# Patient Record
Sex: FEMALE | Race: WHITE | Employment: FULL TIME | URBAN - METROPOLITAN AREA
[De-identification: names, ages, dates, MRNs, and addresses within clinical notes are randomized per-mention and may not be internally consistent; named-entity substitution may affect disease eponyms.]

---

## 2023-08-11 ENCOUNTER — LAB ENCOUNTER (OUTPATIENT)
Dept: LAB | Age: 38
End: 2023-08-11
Attending: INTERNAL MEDICINE
Payer: COMMERCIAL

## 2023-08-11 ENCOUNTER — OFFICE VISIT (OUTPATIENT)
Dept: INTERNAL MEDICINE CLINIC | Facility: CLINIC | Age: 38
End: 2023-08-11
Payer: COMMERCIAL

## 2023-08-11 VITALS
HEIGHT: 64 IN | BODY MASS INDEX: 37.22 KG/M2 | SYSTOLIC BLOOD PRESSURE: 138 MMHG | OXYGEN SATURATION: 98 % | WEIGHT: 218 LBS | DIASTOLIC BLOOD PRESSURE: 84 MMHG | TEMPERATURE: 97 F | HEART RATE: 79 BPM | RESPIRATION RATE: 18 BRPM

## 2023-08-11 DIAGNOSIS — Z87.59 HISTORY OF PRE-ECLAMPSIA: ICD-10-CM

## 2023-08-11 DIAGNOSIS — Z13.220 SCREENING, LIPID: ICD-10-CM

## 2023-08-11 DIAGNOSIS — R42 DIZZINESS: ICD-10-CM

## 2023-08-11 DIAGNOSIS — Z13.228 SCREENING FOR METABOLIC DISORDER: ICD-10-CM

## 2023-08-11 DIAGNOSIS — Z13.29 SCREENING FOR THYROID DISORDER: Primary | ICD-10-CM

## 2023-08-11 DIAGNOSIS — Z13.29 SCREENING FOR THYROID DISORDER: ICD-10-CM

## 2023-08-11 DIAGNOSIS — G43.109 MIGRAINE WITH AURA AND WITHOUT STATUS MIGRAINOSUS, NOT INTRACTABLE: ICD-10-CM

## 2023-08-11 DIAGNOSIS — I10 PRIMARY HYPERTENSION: ICD-10-CM

## 2023-08-11 LAB
ANION GAP SERPL CALC-SCNC: 4 MMOL/L (ref 0–18)
BUN BLD-MCNC: 20 MG/DL (ref 7–18)
CALCIUM BLD-MCNC: 9.5 MG/DL (ref 8.5–10.1)
CHLORIDE SERPL-SCNC: 106 MMOL/L (ref 98–112)
CHOLEST SERPL-MCNC: 166 MG/DL (ref ?–200)
CO2 SERPL-SCNC: 26 MMOL/L (ref 21–32)
CREAT BLD-MCNC: 1.04 MG/DL
EGFRCR SERPLBLD CKD-EPI 2021: 71 ML/MIN/1.73M2 (ref 60–?)
FASTING PATIENT LIPID ANSWER: YES
FASTING STATUS PATIENT QL REPORTED: YES
GLUCOSE BLD-MCNC: 96 MG/DL (ref 70–99)
HDLC SERPL-MCNC: 43 MG/DL (ref 40–59)
LDLC SERPL CALC-MCNC: 85 MG/DL (ref ?–100)
NONHDLC SERPL-MCNC: 123 MG/DL (ref ?–130)
OSMOLALITY SERPL CALC.SUM OF ELEC: 284 MOSM/KG (ref 275–295)
POTASSIUM SERPL-SCNC: 4 MMOL/L (ref 3.5–5.1)
SODIUM SERPL-SCNC: 136 MMOL/L (ref 136–145)
TRIGL SERPL-MCNC: 226 MG/DL (ref 30–149)
TSI SER-ACNC: 0.95 MIU/ML (ref 0.36–3.74)
VLDLC SERPL CALC-MCNC: 36 MG/DL (ref 0–30)

## 2023-08-11 PROCEDURE — 84443 ASSAY THYROID STIM HORMONE: CPT | Performed by: INTERNAL MEDICINE

## 2023-08-11 PROCEDURE — 80048 BASIC METABOLIC PNL TOTAL CA: CPT | Performed by: INTERNAL MEDICINE

## 2023-08-11 PROCEDURE — 3079F DIAST BP 80-89 MM HG: CPT | Performed by: INTERNAL MEDICINE

## 2023-08-11 PROCEDURE — 80061 LIPID PANEL: CPT | Performed by: INTERNAL MEDICINE

## 2023-08-11 PROCEDURE — 3008F BODY MASS INDEX DOCD: CPT | Performed by: INTERNAL MEDICINE

## 2023-08-11 PROCEDURE — 99204 OFFICE O/P NEW MOD 45 MIN: CPT | Performed by: INTERNAL MEDICINE

## 2023-08-11 PROCEDURE — 3075F SYST BP GE 130 - 139MM HG: CPT | Performed by: INTERNAL MEDICINE

## 2023-08-11 RX ORDER — NIFEDIPINE 60 MG/1
60 TABLET, FILM COATED, EXTENDED RELEASE ORAL DAILY
Qty: 90 TABLET | Refills: 0 | Status: SHIPPED | OUTPATIENT
Start: 2023-08-11

## 2023-08-11 RX ORDER — NIFEDIPINE 30 MG
TABLET, EXTENDED RELEASE 24 HR ORAL
COMMUNITY
Start: 2023-04-15

## 2023-08-11 RX ORDER — FAMOTIDINE 20 MG/1
TABLET, FILM COATED ORAL
COMMUNITY
Start: 2023-05-14

## 2023-08-11 RX ORDER — ELETRIPTAN HYDROBROMIDE 40 MG/1
TABLET, FILM COATED ORAL
COMMUNITY
Start: 2023-06-07

## 2023-08-11 RX ORDER — PROMETHAZINE HYDROCHLORIDE 25 MG/1
1 TABLET ORAL EVERY 6 HOURS PRN
COMMUNITY

## 2023-08-23 ENCOUNTER — OFFICE VISIT (OUTPATIENT)
Dept: INTERNAL MEDICINE CLINIC | Facility: CLINIC | Age: 38
End: 2023-08-23
Payer: COMMERCIAL

## 2023-08-23 VITALS
DIASTOLIC BLOOD PRESSURE: 68 MMHG | TEMPERATURE: 97 F | OXYGEN SATURATION: 97 % | HEART RATE: 92 BPM | RESPIRATION RATE: 16 BRPM | SYSTOLIC BLOOD PRESSURE: 128 MMHG | WEIGHT: 223.63 LBS | BODY MASS INDEX: 39.13 KG/M2 | HEIGHT: 63.39 IN

## 2023-08-23 DIAGNOSIS — Z87.59 HISTORY OF PRE-ECLAMPSIA: ICD-10-CM

## 2023-08-23 DIAGNOSIS — G89.29 CHRONIC NECK PAIN: ICD-10-CM

## 2023-08-23 DIAGNOSIS — M54.2 CHRONIC NECK PAIN: ICD-10-CM

## 2023-08-23 DIAGNOSIS — G44.209 TENSION HEADACHE: ICD-10-CM

## 2023-08-23 DIAGNOSIS — G43.109 MIGRAINE WITH AURA AND WITHOUT STATUS MIGRAINOSUS, NOT INTRACTABLE: Primary | ICD-10-CM

## 2023-08-23 DIAGNOSIS — R42 DIZZINESS: ICD-10-CM

## 2023-08-23 PROCEDURE — 3008F BODY MASS INDEX DOCD: CPT | Performed by: INTERNAL MEDICINE

## 2023-08-23 PROCEDURE — 3078F DIAST BP <80 MM HG: CPT | Performed by: INTERNAL MEDICINE

## 2023-08-23 PROCEDURE — 99214 OFFICE O/P EST MOD 30 MIN: CPT | Performed by: INTERNAL MEDICINE

## 2023-08-23 PROCEDURE — 3074F SYST BP LT 130 MM HG: CPT | Performed by: INTERNAL MEDICINE

## 2023-08-23 RX ORDER — PROPRANOLOL HCL 60 MG
60 CAPSULE, EXTENDED RELEASE 24HR ORAL DAILY
Qty: 30 CAPSULE | Refills: 0 | Status: SHIPPED | OUTPATIENT
Start: 2023-08-23 | End: 2023-09-22

## 2023-08-24 ENCOUNTER — HOSPITAL ENCOUNTER (OUTPATIENT)
Dept: CT IMAGING | Age: 38
Discharge: HOME | End: 2023-08-24
Attending: INTERNAL MEDICINE
Payer: COMMERCIAL

## 2023-08-24 DIAGNOSIS — R42 DIZZINESS: ICD-10-CM

## 2023-08-24 DIAGNOSIS — G43.109 MIGRAINE WITH AURA AND WITHOUT STATUS MIGRAINOSUS, NOT INTRACTABLE: ICD-10-CM

## 2023-08-24 PROCEDURE — 70450 CT HEAD/BRAIN W/O DYE: CPT | Performed by: INTERNAL MEDICINE

## 2023-08-25 ENCOUNTER — LABORATORY ENCOUNTER (OUTPATIENT)
Dept: LAB | Age: 38
End: 2023-08-25
Attending: INTERNAL MEDICINE
Payer: COMMERCIAL

## 2023-08-25 DIAGNOSIS — Z87.59 HISTORY OF PRE-ECLAMPSIA: ICD-10-CM

## 2023-08-25 LAB
ALBUMIN SERPL-MCNC: 3.8 G/DL (ref 3.4–5)
ALP LIVER SERPL-CCNC: 106 U/L
ALT SERPL-CCNC: 29 U/L
AST SERPL-CCNC: 16 U/L (ref 15–37)
BASOPHILS # BLD AUTO: 0.07 X10(3) UL (ref 0–0.2)
BASOPHILS NFR BLD AUTO: 1.2 %
BILIRUB DIRECT SERPL-MCNC: <0.1 MG/DL (ref 0–0.2)
BILIRUB SERPL-MCNC: 0.5 MG/DL (ref 0.1–2)
BILIRUB UR QL STRIP.AUTO: NEGATIVE
CLARITY UR REFRACT.AUTO: CLEAR
COLOR UR AUTO: YELLOW
EOSINOPHIL # BLD AUTO: 0.11 X10(3) UL (ref 0–0.7)
EOSINOPHIL NFR BLD AUTO: 1.9 %
ERYTHROCYTE [DISTWIDTH] IN BLOOD BY AUTOMATED COUNT: 14.3 %
GLUCOSE UR STRIP.AUTO-MCNC: NORMAL MG/DL
HCT VFR BLD AUTO: 39.8 %
HGB BLD-MCNC: 13.3 G/DL
IMM GRANULOCYTES # BLD AUTO: 0.03 X10(3) UL (ref 0–1)
IMM GRANULOCYTES NFR BLD: 0.5 %
KETONES UR STRIP.AUTO-MCNC: NEGATIVE MG/DL
LEUKOCYTE ESTERASE UR QL STRIP.AUTO: NEGATIVE
LYMPHOCYTES # BLD AUTO: 2.35 X10(3) UL (ref 1–4)
LYMPHOCYTES NFR BLD AUTO: 40.7 %
MCH RBC QN AUTO: 28.9 PG (ref 26–34)
MCHC RBC AUTO-ENTMCNC: 33.4 G/DL (ref 31–37)
MCV RBC AUTO: 86.5 FL
MONOCYTES # BLD AUTO: 0.44 X10(3) UL (ref 0.1–1)
MONOCYTES NFR BLD AUTO: 7.6 %
NEUTROPHILS # BLD AUTO: 2.78 X10 (3) UL (ref 1.5–7.7)
NEUTROPHILS # BLD AUTO: 2.78 X10(3) UL (ref 1.5–7.7)
NEUTROPHILS NFR BLD AUTO: 48.1 %
NITRITE UR QL STRIP.AUTO: NEGATIVE
PH UR STRIP.AUTO: 6 [PH] (ref 5–8)
PLATELET # BLD AUTO: 225 10(3)UL (ref 150–450)
PROT SERPL-MCNC: 7.1 G/DL (ref 6.4–8.2)
PROT UR STRIP.AUTO-MCNC: 20 MG/DL
RBC # BLD AUTO: 4.6 X10(6)UL
RBC #/AREA URNS AUTO: >10 /HPF
SP GR UR STRIP.AUTO: 1.02 (ref 1–1.03)
UROBILINOGEN UR STRIP.AUTO-MCNC: NORMAL MG/DL
WBC # BLD AUTO: 5.8 X10(3) UL (ref 4–11)

## 2023-08-25 PROCEDURE — 81001 URINALYSIS AUTO W/SCOPE: CPT | Performed by: INTERNAL MEDICINE

## 2023-08-25 PROCEDURE — 85025 COMPLETE CBC W/AUTO DIFF WBC: CPT | Performed by: INTERNAL MEDICINE

## 2023-08-25 PROCEDURE — 80076 HEPATIC FUNCTION PANEL: CPT | Performed by: INTERNAL MEDICINE

## 2023-08-27 DIAGNOSIS — R74.8 ELEVATED ALKALINE PHOSPHATASE LEVEL: Primary | ICD-10-CM

## 2023-08-27 DIAGNOSIS — R31.9 HEMATURIA, UNSPECIFIED TYPE: ICD-10-CM

## 2023-08-28 ENCOUNTER — PATIENT MESSAGE (OUTPATIENT)
Dept: INTERNAL MEDICINE CLINIC | Facility: CLINIC | Age: 38
End: 2023-08-28

## 2023-08-30 ENCOUNTER — APPOINTMENT (OUTPATIENT)
Facility: LOCATION | Age: 38
End: 2023-08-30
Attending: INTERNAL MEDICINE
Payer: COMMERCIAL

## 2023-08-30 ENCOUNTER — TELEPHONE (OUTPATIENT)
Dept: PHYSICAL THERAPY | Facility: HOSPITAL | Age: 38
End: 2023-08-30

## 2023-09-05 NOTE — TELEPHONE ENCOUNTER
Spoke with patient   Repeating UA in 1 month  HA improved with propranolol  Patient to check BP for the next 1 week and send via [employer]

## 2023-09-06 ENCOUNTER — APPOINTMENT (OUTPATIENT)
Facility: LOCATION | Age: 38
End: 2023-09-06
Attending: INTERNAL MEDICINE
Payer: COMMERCIAL

## 2023-09-12 ENCOUNTER — APPOINTMENT (OUTPATIENT)
Facility: LOCATION | Age: 38
End: 2023-09-12
Attending: INTERNAL MEDICINE
Payer: COMMERCIAL

## 2023-09-14 ENCOUNTER — APPOINTMENT (OUTPATIENT)
Facility: LOCATION | Age: 38
End: 2023-09-14
Attending: INTERNAL MEDICINE
Payer: COMMERCIAL

## 2023-09-19 ENCOUNTER — APPOINTMENT (OUTPATIENT)
Facility: LOCATION | Age: 38
End: 2023-09-19
Attending: INTERNAL MEDICINE
Payer: COMMERCIAL

## 2023-09-21 ENCOUNTER — APPOINTMENT (OUTPATIENT)
Facility: LOCATION | Age: 38
End: 2023-09-21
Attending: INTERNAL MEDICINE
Payer: COMMERCIAL

## 2023-09-22 ENCOUNTER — APPOINTMENT (OUTPATIENT)
Dept: PHYSICAL THERAPY | Facility: HOSPITAL | Age: 38
End: 2023-09-22
Attending: INTERNAL MEDICINE
Payer: COMMERCIAL

## 2023-09-22 ENCOUNTER — TELEPHONE (OUTPATIENT)
Dept: PHYSICAL THERAPY | Facility: HOSPITAL | Age: 38
End: 2023-09-22

## 2023-09-22 NOTE — TELEPHONE ENCOUNTER
Called the patient regarding her missed physical therapy evaluation. There was no answer, so left a voicemail. Reminded the patient of her next scheduled appointment and asked for a call back if she is unable to make this appointment.

## 2023-09-26 ENCOUNTER — APPOINTMENT (OUTPATIENT)
Facility: LOCATION | Age: 38
End: 2023-09-26
Attending: INTERNAL MEDICINE
Payer: COMMERCIAL

## 2023-09-28 ENCOUNTER — APPOINTMENT (OUTPATIENT)
Facility: LOCATION | Age: 38
End: 2023-09-28
Attending: INTERNAL MEDICINE
Payer: COMMERCIAL

## 2023-10-02 RX ORDER — PROPRANOLOL HCL 60 MG
1 CAPSULE, EXTENDED RELEASE 24HR ORAL DAILY
Qty: 90 CAPSULE | Refills: 0 | Status: SHIPPED | OUTPATIENT
Start: 2023-10-02

## 2023-10-04 ENCOUNTER — TELEPHONE (OUTPATIENT)
Dept: PHYSICAL THERAPY | Facility: HOSPITAL | Age: 38
End: 2023-10-04

## 2023-10-05 ENCOUNTER — APPOINTMENT (OUTPATIENT)
Dept: PHYSICAL THERAPY | Facility: HOSPITAL | Age: 38
End: 2023-10-05
Attending: INTERNAL MEDICINE
Payer: COMMERCIAL

## 2023-10-09 ENCOUNTER — OFFICE VISIT (OUTPATIENT)
Dept: OBGYN CLINIC | Facility: CLINIC | Age: 38
End: 2023-10-09
Payer: COMMERCIAL

## 2023-10-09 ENCOUNTER — LAB ENCOUNTER (OUTPATIENT)
Dept: LAB | Age: 38
End: 2023-10-09
Attending: STUDENT IN AN ORGANIZED HEALTH CARE EDUCATION/TRAINING PROGRAM
Payer: COMMERCIAL

## 2023-10-09 VITALS
HEART RATE: 72 BPM | RESPIRATION RATE: 16 BRPM | HEIGHT: 63 IN | DIASTOLIC BLOOD PRESSURE: 100 MMHG | BODY MASS INDEX: 39.69 KG/M2 | WEIGHT: 224 LBS | SYSTOLIC BLOOD PRESSURE: 140 MMHG

## 2023-10-09 DIAGNOSIS — R79.89 ELEVATED SERUM CREATININE: ICD-10-CM

## 2023-10-09 DIAGNOSIS — Z31.69 ENCOUNTER FOR PRECONCEPTION CONSULTATION: ICD-10-CM

## 2023-10-09 LAB
ANION GAP SERPL CALC-SCNC: 5 MMOL/L (ref 0–18)
BUN BLD-MCNC: 21 MG/DL (ref 7–18)
CALCIUM BLD-MCNC: 9.2 MG/DL (ref 8.5–10.1)
CHLORIDE SERPL-SCNC: 108 MMOL/L (ref 98–112)
CO2 SERPL-SCNC: 25 MMOL/L (ref 21–32)
CREAT BLD-MCNC: 0.86 MG/DL
EGFRCR SERPLBLD CKD-EPI 2021: 89 ML/MIN/1.73M2 (ref 60–?)
FASTING STATUS PATIENT QL REPORTED: NO
GLUCOSE BLD-MCNC: 139 MG/DL (ref 70–99)
OSMOLALITY SERPL CALC.SUM OF ELEC: 291 MOSM/KG (ref 275–295)
POTASSIUM SERPL-SCNC: 3.9 MMOL/L (ref 3.5–5.1)
SODIUM SERPL-SCNC: 138 MMOL/L (ref 136–145)

## 2023-10-09 PROCEDURE — 3008F BODY MASS INDEX DOCD: CPT | Performed by: STUDENT IN AN ORGANIZED HEALTH CARE EDUCATION/TRAINING PROGRAM

## 2023-10-09 PROCEDURE — 3080F DIAST BP >= 90 MM HG: CPT | Performed by: STUDENT IN AN ORGANIZED HEALTH CARE EDUCATION/TRAINING PROGRAM

## 2023-10-09 PROCEDURE — 3077F SYST BP >= 140 MM HG: CPT | Performed by: STUDENT IN AN ORGANIZED HEALTH CARE EDUCATION/TRAINING PROGRAM

## 2023-10-09 PROCEDURE — 99204 OFFICE O/P NEW MOD 45 MIN: CPT | Performed by: STUDENT IN AN ORGANIZED HEALTH CARE EDUCATION/TRAINING PROGRAM

## 2023-10-09 PROCEDURE — 80048 BASIC METABOLIC PNL TOTAL CA: CPT

## 2023-10-09 PROCEDURE — 36415 COLL VENOUS BLD VENIPUNCTURE: CPT

## 2023-10-09 RX ORDER — VITAMIN B COMPLEX
TABLET ORAL AS DIRECTED
COMMUNITY

## 2023-10-09 NOTE — PROGRESS NOTES
GYN PROBLEM VISIT    Subjective: This is a 45year old [id number] presenting for GYN visit. She delivered her last child via  in April, conceived via IVF. She has embryos remaining. They were tested. She would like to conceive. Review of Systems   Constitutional: Negative. HENT: Negative. Respiratory: Negative. Gastrointestinal: Negative. Endocrine: Negative. Genitourinary: Negative. Musculoskeletal: Negative. Skin: Negative. Allergic/Immunologic: Negative. Neurological: Negative. Past Medical History:   Diagnosis Date    GERD     Hypertriglyceridemia     Obesity        History reviewed. No pertinent surgical history. No Known Allergies    Propranolol HCl ER 60 MG Oral Capsule SR 24 Hr, TAKE 1 CAPSULE(60 MG) BY MOUTH DAILY, Disp: 90 capsule, Rfl: 0  PROCARDIA XL 30 MG Oral Tablet 24 Hr, , Disp: , Rfl:   famotidine 20 MG Oral Tab, , Disp: , Rfl:   Eletriptan Hydrobromide 40 MG Oral Tab, , Disp: , Rfl:   promethazine 25 MG Oral Tab, Take 1 tablet (25 mg total) by mouth every 6 (six) hours as needed. , Disp: , Rfl:   Esomeprazole Magnesium 20 MG Oral Capsule Delayed Release, Take 1 capsule (20 mg total) by mouth every morning before breakfast., Disp: , Rfl:   NIFEdipine ER 60 MG Oral Tablet 24 Hr, Take 1 tablet (60 mg total) by mouth daily. , Disp: 90 tablet, Rfl: 0          Objective:    Physical Exam      10/09/23  1056   BP: (!) 140/100   Pulse: 72   Resp: 16        Constitutional: She is oriented to person, place, and time. She appears well-developed and well-nourished. Assessment/Plan: This is a 45year old [id number] presenting for preconception counseling. She developed preeclampsia necessitating delivery at 37 weeks with last pregnancy. She now has chronic HTN. She is on procardia and propanolol. We discussed risk of superimposed preeclampsia with preexisting HTN. We discussed risk of end organ damage with preeclampsia. Discussed aspirin at 12 weeks.  We discussed risk of growth restriction, placental insufficiency, still birth and  delivery. Discussed fetal echo given IVF gestation, serial growth ultrasounds and NSTs around 34 weeks for cHTN. Strongly recommended weight loss. Referral to weight loss clinic ordered. Also noted Cr to be 1.04, so repeat was ordered. Total patient time was 30 minutes in evaluation, consultation, and coordination of care. This included face to face and non-face to face actions. The patient's questions and concerns that were addressed.      Aurelia Darby MD

## 2023-10-09 NOTE — PATIENT INSTRUCTIONS
Reproductive Endocrinology and Infertility Specialist (MIRANDA):    1) Dr. Benny Cabrera, IVF1   [address] [place], [address]  [phone]    2) Dr. Jaja Gillette   Phone: [phone]  Address: [facility] [address], [address]  [place], [address]    3) [facility]

## 2023-10-11 ENCOUNTER — APPOINTMENT (OUTPATIENT)
Dept: PHYSICAL THERAPY | Facility: HOSPITAL | Age: 38
End: 2023-10-11
Attending: INTERNAL MEDICINE
Payer: COMMERCIAL

## 2023-10-13 ENCOUNTER — OFFICE VISIT (OUTPATIENT)
Dept: PHYSICAL THERAPY | Facility: HOSPITAL | Age: 38
End: 2023-10-13
Attending: INTERNAL MEDICINE
Payer: COMMERCIAL

## 2023-10-13 DIAGNOSIS — G44.209 TENSION HEADACHE: ICD-10-CM

## 2023-10-13 DIAGNOSIS — G89.29 CHRONIC NECK PAIN: Primary | ICD-10-CM

## 2023-10-13 DIAGNOSIS — M54.2 CHRONIC NECK PAIN: Primary | ICD-10-CM

## 2023-10-13 PROCEDURE — 97162 PT EVAL MOD COMPLEX 30 MIN: CPT

## 2023-10-13 PROCEDURE — 97140 MANUAL THERAPY 1/> REGIONS: CPT

## 2023-10-16 ENCOUNTER — OFFICE VISIT (OUTPATIENT)
Dept: PHYSICAL THERAPY | Facility: HOSPITAL | Age: 38
End: 2023-10-16
Attending: INTERNAL MEDICINE
Payer: COMMERCIAL

## 2023-10-16 PROCEDURE — 97140 MANUAL THERAPY 1/> REGIONS: CPT

## 2023-10-16 PROCEDURE — 97110 THERAPEUTIC EXERCISES: CPT

## 2023-10-16 NOTE — PROGRESS NOTES
Diagnosis:   Chronic neck pain (M54.2,G89.29)  Tension headache (G44.209)     Referring Provider: Silver Kelley  Date of Evaluation:    10/13/2023    Precautions:  None Next MD visit:   none scheduled  Date of Surgery: n/a   Insurance Primary/Secondary: [address] [place] [address] / N/A     # Auth Visits: 8 POC            Subjective: the patient reports that she had no headache after last session. She states that she had a headache yesterday and Saturday in the forehead , but not the neck. It felt like tension in her forehead. She took 3 Advil which reduced it, then she was able to fall asleep. Then it was better when she woke. She felt fatigued with the headache. Pain: 3/10      Objective:     Palpation: there is increased soft tissue tension on L upper trapezius, palpation reproduces pain in the forehead  Flexion rotation test: (-), pt has equal ROM bilaterally and no pain with this. Assessment: The patient had her forehead pain reproduced with palpation to upper trapezius on L today. The patient had a different type of headache from the headaches that radiate from the cervical spine. She had resolution of her headache with palpation to the upper trapezius. Continued with PA mobilizations in upper cervical spine to address mobility and decrease pain. Goals:   (to be met in 8 visits)  PROGRESSING  The patient will report 2 headaches or fewer per week  The patient will report being able to work past 3 pm without increase in headache symptoms  The patient will report little difficulty concentrating on NDI  The patient will report that her sleep is no longer disturbed due to headaches  The patient will be independent and adherent in a comprehensive HEP    Plan: scapular strength in prone  Date: 10/16/2023  TX#: 2/8 Date:                 TX#: 3/ Date:                 TX#: 4/ Date:                 TX#: 5/ Date:    Tx#: 6/   Manual therapy  STM to upper trapezius on L  C2 CPA grade III  X13 min       Therapeutic exercise  Low row green TB  Upper trap stretch 3x30 sec on L  Shoulder extension green TB 2x10  Cervical protraction/retraction 10x       X       X       HEP:   Access Code: [email or web address]  URL: "[email or web address]  Date: 10/16/2023  Prepared by: Paul Bell    Exercises  - Seated Cervical Retraction  - 5 x daily - 7 x weekly - 1 sets - 10 reps  - Seated Upper Trapezius Stretch  - 1 x daily - 7 x weekly - 1 sets - 3 reps - 30 sec hold    Charges: manual therapy x1, therapeutic exercise x2       Total Timed Treatment: 45 min  Total Treatment Time: 45 min

## 2023-10-18 ENCOUNTER — OFFICE VISIT (OUTPATIENT)
Dept: PHYSICAL THERAPY | Facility: HOSPITAL | Age: 38
End: 2023-10-18
Attending: INTERNAL MEDICINE
Payer: COMMERCIAL

## 2023-10-18 PROCEDURE — 97110 THERAPEUTIC EXERCISES: CPT

## 2023-10-18 PROCEDURE — 97140 MANUAL THERAPY 1/> REGIONS: CPT

## 2023-10-18 NOTE — PROGRESS NOTES
Diagnosis:   Chronic neck pain (M54.2,G89.29)  Tension headache (G44.209)     Referring Provider: Lety Darnell  Date of Evaluation:    10/13/2023    Precautions:  None Next MD visit:   none scheduled  Date of Surgery: n/a   Insurance Primary/Secondary: [address] [place] [address] / N/A     # Auth Visits: 8 POC            Subjective: no headache yesterday or today. The headahce was better after the last session. She feels some pressure in her sinus. Pain: 0/10      Objective:     Palpation: there is increased soft tissue tension on L upper trapezius, palpation reproduces pain in the forehead      Prone extension endurance test: 1 min. Research: The migraine group held the neck extensor endurance test position for a median of 166.5 seconds compared to 290.5 seconds held by the control group. Assessment: The patient has noted improvements in her symptoms in the last few days, with no headache in the last 2 days. She does continue to have hypomobility at C2, therefore continued with mobilizations here. Assessed a deep extensor test to assess for postural endurance. Will have the patient perform resisted cervical extension at home. Goals:   (to be met in 8 visits)  PROGRESSING  The patient will report 2 headaches or fewer per week  The patient will report being able to work past 3 pm without increase in headache symptoms  The patient will report little difficulty concentrating on NDI  The patient will report that her sleep is no longer disturbed due to headaches  The patient will be independent and adherent in a comprehensive HEP    Plan: scapular strength in prone on swiss ball  Date: 10/16/2023  TX#: 2/8 Date: 10/18/2023            TX#: 3/8 Date:                 TX#: 4/ Date:                 TX#: 5/ Date:    Tx#: 6/   Manual therapy  STM to upper trapezius on L  C2 CPA grade III  X13 min Manual therapy  C2 CPA grade III, grade III+  X9 min        Therapeutic exercise  Low row green TB  Upper trap stretch 3x30 sec on L  Shoulder extension green TB 2x10  Cervical protraction/retraction 10x Therapeutic exercise  Prone resisted cervical extension green TB 3 sets to fatigue  Prone horizontal abduction 2# 3x8-12  Sidelying open book rotation 10x ea  Pec stretch 3x30 sec in door      X X      X X      HEP:   Access Code: [email or web address]  URL: [email or web address]  Date: 10/18/2023  Prepared by: Franco Parekh    Exercises  - Seated Cervical Retraction  - 5 x daily - 7 x weekly - 1 sets - 10 reps  - Seated Upper Trapezius Stretch  - 1 x daily - 7 x weekly - 1 sets - 3 reps - 30 sec hold  - Cervical Retraction Prone on Elbows  - 1 x daily - 7 x weekly - 3 sets - 6-10 reps    Charges: manual therapy x1, therapeutic exercise x2       Total Timed Treatment: 45 min  Total Treatment Time: 45 min

## 2023-10-19 ENCOUNTER — APPOINTMENT (OUTPATIENT)
Dept: PHYSICAL THERAPY | Facility: HOSPITAL | Age: 38
End: 2023-10-19
Attending: INTERNAL MEDICINE
Payer: COMMERCIAL

## 2023-11-01 ENCOUNTER — APPOINTMENT (OUTPATIENT)
Dept: PHYSICAL THERAPY | Facility: HOSPITAL | Age: 38
End: 2023-11-01
Attending: INTERNAL MEDICINE
Payer: COMMERCIAL

## 2023-11-01 ENCOUNTER — TELEPHONE (OUTPATIENT)
Dept: PHYSICAL THERAPY | Facility: HOSPITAL | Age: 38
End: 2023-11-01

## 2023-11-03 ENCOUNTER — APPOINTMENT (OUTPATIENT)
Dept: PHYSICAL THERAPY | Facility: HOSPITAL | Age: 38
End: 2023-11-03
Attending: INTERNAL MEDICINE
Payer: COMMERCIAL

## 2023-11-06 ENCOUNTER — HOSPITAL ENCOUNTER (OUTPATIENT)
Age: 38
Discharge: HOME | End: 2023-11-06
Payer: COMMERCIAL

## 2023-11-06 VITALS
DIASTOLIC BLOOD PRESSURE: 88 MMHG | HEIGHT: 63 IN | TEMPERATURE: 97 F | HEART RATE: 88 BPM | RESPIRATION RATE: 18 BRPM | BODY MASS INDEX: 38.98 KG/M2 | SYSTOLIC BLOOD PRESSURE: 133 MMHG | WEIGHT: 220 LBS | OXYGEN SATURATION: 99 %

## 2023-11-06 DIAGNOSIS — J06.9 UPPER RESPIRATORY INFECTION WITH COUGH AND CONGESTION: Primary | ICD-10-CM

## 2023-11-06 PROCEDURE — 99203 OFFICE O/P NEW LOW 30 MIN: CPT | Performed by: NURSE PRACTITIONER

## 2023-11-06 RX ORDER — ALBUTEROL SULFATE 90 UG/1
2 AEROSOL, METERED RESPIRATORY (INHALATION) EVERY 4 HOURS PRN
Qty: 1 EACH | Refills: 0 | Status: SHIPPED | OUTPATIENT
Start: 2023-11-06 | End: 2023-12-06

## 2023-11-06 RX ORDER — PREDNISONE 20 MG/1
40 TABLET ORAL DAILY
Qty: 10 TABLET | Refills: 0 | Status: SHIPPED | OUTPATIENT
Start: 2023-11-06 | End: 2023-11-11

## 2023-11-07 NOTE — ED INITIAL ASSESSMENT (HPI)
Patient with cough/URI and ear pain that started on Saturday. Pt has done two home covid tests, both negative.

## 2023-11-07 NOTE — DISCHARGE INSTRUCTIONS
Take the medications as prescribed. Interventions to help decrease congestion: Over the counter Flonase, hot steam showers, saline irrigation such as the [employer], Over the counter Zyrtec, Claritin, or Allegra. Drink plenty fluids, especially water. Return or go to the ER for new or worsening symptoms.

## 2023-11-11 DIAGNOSIS — I10 PRIMARY HYPERTENSION: ICD-10-CM

## 2023-11-11 DIAGNOSIS — Z87.59 HISTORY OF PRE-ECLAMPSIA: ICD-10-CM

## 2023-11-13 NOTE — TELEPHONE ENCOUNTER
Last OV: Acute 08/23/23 MP    Future Appointments   Date Time Provider Department Center   1/10/2024  3:00 PM Bang Chiang MD [facility]   2/6/2024  9:20 AM Mel Go APRN [facility]        Latest labs:   BMP,Lipid, TSH 08/11/23    Latest RX:   Medication Quantity Refills Start End   NIFEdipine ER 60 MG Oral Tablet 24 Hr 90 tablet 0 8/11/2023    Sig:   Take 1 tablet (60 mg total) by mouth daily.       Per protocol    Hypertension Medications Protocol [id number]/11/2023 04:30 PM   Protocol Details CMP or BMP in past 12 months    Last serum creatinine< 2.0    Appointment in past 6 or next 3 months

## 2023-11-22 ENCOUNTER — HOSPITAL ENCOUNTER (EMERGENCY)
Facility: HOSPITAL | Age: 38
Discharge: HOME | End: 2023-11-23
Attending: EMERGENCY MEDICINE
Payer: COMMERCIAL

## 2023-11-22 DIAGNOSIS — D64.9 ANEMIA, UNSPECIFIED TYPE: Primary | ICD-10-CM

## 2023-11-22 PROCEDURE — 86850 RBC ANTIBODY SCREEN: CPT | Performed by: EMERGENCY MEDICINE

## 2023-11-22 PROCEDURE — 36415 COLL VENOUS BLD VENIPUNCTURE: CPT

## 2023-11-22 PROCEDURE — 99284 EMERGENCY DEPT VISIT MOD MDM: CPT

## 2023-11-22 PROCEDURE — 84702 CHORIONIC GONADOTROPIN TEST: CPT | Performed by: EMERGENCY MEDICINE

## 2023-11-22 PROCEDURE — 86901 BLOOD TYPING SEROLOGIC RH(D): CPT | Performed by: EMERGENCY MEDICINE

## 2023-11-22 PROCEDURE — 99283 EMERGENCY DEPT VISIT LOW MDM: CPT

## 2023-11-22 PROCEDURE — 80053 COMPREHEN METABOLIC PANEL: CPT | Performed by: EMERGENCY MEDICINE

## 2023-11-22 PROCEDURE — 86900 BLOOD TYPING SEROLOGIC ABO: CPT | Performed by: EMERGENCY MEDICINE

## 2023-11-22 PROCEDURE — 85025 COMPLETE CBC W/AUTO DIFF WBC: CPT | Performed by: EMERGENCY MEDICINE

## 2023-11-23 VITALS
SYSTOLIC BLOOD PRESSURE: 120 MMHG | HEIGHT: 63 IN | HEART RATE: 72 BPM | RESPIRATION RATE: 16 BRPM | BODY MASS INDEX: 38.98 KG/M2 | DIASTOLIC BLOOD PRESSURE: 64 MMHG | OXYGEN SATURATION: 99 % | TEMPERATURE: 98 F | WEIGHT: 220 LBS

## 2023-11-23 LAB
ALBUMIN SERPL-MCNC: 3.3 G/DL (ref 3.4–5)
ALBUMIN/GLOB SERPL: 0.9 {RATIO} (ref 1–2)
ALP LIVER SERPL-CCNC: 101 U/L
ALT SERPL-CCNC: 25 U/L
ANION GAP SERPL CALC-SCNC: 6 MMOL/L (ref 0–18)
ANTIBODY SCREEN: NEGATIVE
AST SERPL-CCNC: 20 U/L (ref 15–37)
B-HCG SERPL-ACNC: <1 MIU/ML
BASOPHILS # BLD AUTO: 0.03 X10(3) UL (ref 0–0.2)
BASOPHILS NFR BLD AUTO: 0.4 %
BILIRUB SERPL-MCNC: 0.2 MG/DL (ref 0.1–2)
BUN BLD-MCNC: 21 MG/DL (ref 9–23)
CALCIUM BLD-MCNC: 8.8 MG/DL (ref 8.5–10.1)
CHLORIDE SERPL-SCNC: 110 MMOL/L (ref 98–112)
CO2 SERPL-SCNC: 23 MMOL/L (ref 21–32)
CREAT BLD-MCNC: 0.95 MG/DL
EGFRCR SERPLBLD CKD-EPI 2021: 79 ML/MIN/1.73M2 (ref 60–?)
EOSINOPHIL # BLD AUTO: 0.13 X10(3) UL (ref 0–0.7)
EOSINOPHIL NFR BLD AUTO: 1.9 %
ERYTHROCYTE [DISTWIDTH] IN BLOOD BY AUTOMATED COUNT: 13.1 %
GLOBULIN PLAS-MCNC: 3.5 G/DL (ref 2.8–4.4)
GLUCOSE BLD-MCNC: 114 MG/DL (ref 70–99)
HCT VFR BLD AUTO: 22.5 %
HGB BLD-MCNC: 7.7 G/DL
IMM GRANULOCYTES # BLD AUTO: 0.03 X10(3) UL (ref 0–1)
IMM GRANULOCYTES NFR BLD: 0.4 %
LYMPHOCYTES # BLD AUTO: 2.34 X10(3) UL (ref 1–4)
LYMPHOCYTES NFR BLD AUTO: 33.6 %
MCH RBC QN AUTO: 30.3 PG (ref 26–34)
MCHC RBC AUTO-ENTMCNC: 34.2 G/DL (ref 31–37)
MCV RBC AUTO: 88.6 FL
MONOCYTES # BLD AUTO: 0.56 X10(3) UL (ref 0.1–1)
MONOCYTES NFR BLD AUTO: 8 %
NEUTROPHILS # BLD AUTO: 3.87 X10 (3) UL (ref 1.5–7.7)
NEUTROPHILS # BLD AUTO: 3.87 X10(3) UL (ref 1.5–7.7)
NEUTROPHILS NFR BLD AUTO: 55.7 %
OSMOLALITY SERPL CALC.SUM OF ELEC: 292 MOSM/KG (ref 275–295)
PLATELET # BLD AUTO: 351 10(3)UL (ref 150–450)
POTASSIUM SERPL-SCNC: 4.1 MMOL/L (ref 3.5–5.1)
PROT SERPL-MCNC: 6.8 G/DL (ref 6.4–8.2)
RBC # BLD AUTO: 2.54 X10(6)UL
RH BLOOD TYPE: POSITIVE
SODIUM SERPL-SCNC: 139 MMOL/L (ref 136–145)
WBC # BLD AUTO: 7 X10(3) UL (ref 4–11)

## 2023-11-23 NOTE — ED INITIAL ASSESSMENT (HPI)
Here for chest tightness x 3 days. Generalized weakness & fatigue.  Prolonged profuse menstrual period x 3 weeks now. + pallor. + SOB

## 2023-11-24 ENCOUNTER — TELEPHONE (OUTPATIENT)
Dept: OBGYN CLINIC | Facility: CLINIC | Age: 38
End: 2023-11-24

## 2023-11-24 NOTE — TELEPHONE ENCOUNTER
Spoke to patient. Offered ER follow-up appointment on 11/29- patient accepts. Currently passing quarter size clots, filling a tampon and a large pad every 1-2 hours. Bleeding increases in the afternoon for 2 hours and she saturates a large pad every 30 minutes. Mild cramping pain. Patient was seen in the ER on 11/22 with complaint of shortness of breath and dizziness due to prolonged vaginal bleeding. HGB 7.7  Patient is taking prenatal vitamin and iron supplement. Patient did not receive any IV fluids or prescription medications at the ER and symptoms of anemia remain. Advised patient to try 600 mg ibuprofen every 6 hours to help slow the bleeding. Instructed patient to go back to the ER if heavy bleeding continues to saturate a large pad in less than an hour or with worsening symptoms of anemia. Patient verbalized understanding.

## 2023-11-27 ENCOUNTER — OFFICE VISIT (OUTPATIENT)
Dept: OBGYN CLINIC | Facility: CLINIC | Age: 38
End: 2023-11-27
Payer: COMMERCIAL

## 2023-11-27 VITALS
WEIGHT: 224 LBS | HEIGHT: 63 IN | SYSTOLIC BLOOD PRESSURE: 108 MMHG | BODY MASS INDEX: 39.69 KG/M2 | DIASTOLIC BLOOD PRESSURE: 70 MMHG | RESPIRATION RATE: 16 BRPM | HEART RATE: 90 BPM

## 2023-11-27 DIAGNOSIS — D50.0 IRON DEFICIENCY ANEMIA DUE TO CHRONIC BLOOD LOSS: Primary | ICD-10-CM

## 2023-11-27 DIAGNOSIS — N93.9 ABNORMAL UTERINE BLEEDING (AUB): ICD-10-CM

## 2023-11-27 RX ORDER — TRANEXAMIC ACID 650 MG/1
1300 TABLET ORAL EVERY 8 HOURS
Qty: 90 TABLET | Refills: 2 | Status: SHIPPED | OUTPATIENT
Start: 2023-11-27

## 2023-11-27 NOTE — PROGRESS NOTES
GYN PROBLEM VISIT    Subjective: This is a 45year old [id number] presenting for GYN visit. Has been having vaginal bleeding since 11/1/23. Has not stopped. Using both a tampon and pad at once and needing to change tampon every 1.5 hours. Has soiled clothing and chair at work. This is her first menses since giving birth earlier this year. Reports fatigue. Went to the ED where hg was found to be 7.7. Last pap: 2022    Review of Systems   Constitutional: fatigue    HENT: Negative. Respiratory: SOB   Gastrointestinal: Negative. Endocrine: Negative. Genitourinary: as above   Musculoskeletal:weakness   Skin: Negative. Allergic/Immunologic: Negative. Neurological: Negative. Past Medical History:   Diagnosis Date    Essential hypertension     GERD     Hypertriglyceridemia     Migraines     Obesity        No past surgical history on file. No Known Allergies     NIFEDIPINE ER 60 MG Oral Tablet 24 Hr TAKE 1 TABLET(60 MG) BY MOUTH DAILY 90 tablet 0    albuterol 108 (90 Base) MCG/ACT Inhalation Aero Soln Inhale 2 puffs into the lungs every 4 (four) hours as needed for Wheezing or Shortness of Breath (Cough, chest tightness). 1 each 0    Cholecalciferol (VITAMIN D) 25 MCG (1000 UT) Oral Tab Take by mouth As Directed. Propranolol HCl ER 60 MG Oral Capsule SR 24 Hr TAKE 1 CAPSULE(60 MG) BY MOUTH DAILY 90 capsule 0    Eletriptan Hydrobromide 40 MG Oral Tab            Objective:    Physical Exam     Vitals:    11/27/23 1607   BP: 108/70   Pulse: 90   Resp: 16        Constitutional: She is oriented to person, place, and time. She appears well-developed and well-nourished. Genitourinary: Normal appearing external genitalia. Vagina is well estrogenized. Normal appearing urethral meatus. Bartholin's gland normal to palpation. Normal appearing cervix. Small blood clots in vagina. Neurological: She is alert and oriented to person, place, and time. Assessment/Plan:   This is a 45year old [id number] presenting with acute AUB. Desires to avoid contraception. Discussed provera vs TXA. Prefers TXA. If bleeding does not significantly decrease or resolve after 5 days of TXA, recommend EMB and ultrasound.     Refer to heme onc for iron infusions given anemia    Neha Calzada MD

## 2023-12-05 ENCOUNTER — HOSPITAL ENCOUNTER (EMERGENCY)
Age: 38
Discharge: HOME | End: 2023-12-05
Attending: EMERGENCY MEDICINE
Payer: COMMERCIAL

## 2023-12-05 VITALS
RESPIRATION RATE: 16 BRPM | OXYGEN SATURATION: 96 % | TEMPERATURE: 97 F | WEIGHT: 220 LBS | BODY MASS INDEX: 38.98 KG/M2 | HEART RATE: 80 BPM | HEIGHT: 63 IN | SYSTOLIC BLOOD PRESSURE: 114 MMHG | DIASTOLIC BLOOD PRESSURE: 68 MMHG

## 2023-12-05 DIAGNOSIS — G43.001 MIGRAINE WITHOUT AURA AND WITH STATUS MIGRAINOSUS, NOT INTRACTABLE: Primary | ICD-10-CM

## 2023-12-05 LAB
BASOPHILS # BLD AUTO: 0.06 X10(3) UL (ref 0–0.2)
BASOPHILS NFR BLD AUTO: 1 %
EOSINOPHIL # BLD AUTO: 0.04 X10(3) UL (ref 0–0.7)
EOSINOPHIL NFR BLD AUTO: 0.7 %
ERYTHROCYTE [DISTWIDTH] IN BLOOD BY AUTOMATED COUNT: 13.5 %
HCT VFR BLD AUTO: 26 %
HGB BLD-MCNC: 8.3 G/DL
IMM GRANULOCYTES # BLD AUTO: 0.03 X10(3) UL (ref 0–1)
IMM GRANULOCYTES NFR BLD: 0.5 %
LYMPHOCYTES # BLD AUTO: 1.07 X10(3) UL (ref 1–4)
LYMPHOCYTES NFR BLD AUTO: 17.5 %
MCH RBC QN AUTO: 27 PG (ref 26–34)
MCHC RBC AUTO-ENTMCNC: 31.9 G/DL (ref 31–37)
MCV RBC AUTO: 84.7 FL
MONOCYTES # BLD AUTO: 0.27 X10(3) UL (ref 0.1–1)
MONOCYTES NFR BLD AUTO: 4.4 %
NEUTROPHILS # BLD AUTO: 4.63 X10 (3) UL (ref 1.5–7.7)
NEUTROPHILS # BLD AUTO: 4.63 X10(3) UL (ref 1.5–7.7)
NEUTROPHILS NFR BLD AUTO: 75.9 %
PLATELET # BLD AUTO: 349 10(3)UL (ref 150–450)
RBC # BLD AUTO: 3.07 X10(6)UL
WBC # BLD AUTO: 6.1 X10(3) UL (ref 4–11)

## 2023-12-05 PROCEDURE — 96375 TX/PRO/DX INJ NEW DRUG ADDON: CPT

## 2023-12-05 PROCEDURE — 85025 COMPLETE CBC W/AUTO DIFF WBC: CPT | Performed by: EMERGENCY MEDICINE

## 2023-12-05 PROCEDURE — 99284 EMERGENCY DEPT VISIT MOD MDM: CPT

## 2023-12-05 PROCEDURE — 96361 HYDRATE IV INFUSION ADD-ON: CPT

## 2023-12-05 PROCEDURE — 96374 THER/PROPH/DIAG INJ IV PUSH: CPT

## 2023-12-05 RX ORDER — KETOROLAC TROMETHAMINE 10 MG/1
10 TABLET, FILM COATED ORAL EVERY 6 HOURS PRN
Qty: 20 TABLET | Refills: 0 | Status: SHIPPED | OUTPATIENT
Start: 2023-12-05

## 2023-12-05 RX ORDER — DIPHENHYDRAMINE HYDROCHLORIDE 50 MG/ML
50 INJECTION INTRAMUSCULAR; INTRAVENOUS ONCE
Status: COMPLETED | OUTPATIENT
Start: 2023-12-05 | End: 2023-12-05

## 2023-12-05 RX ORDER — KETOROLAC TROMETHAMINE 15 MG/ML
15 INJECTION, SOLUTION INTRAMUSCULAR; INTRAVENOUS ONCE
Status: COMPLETED | OUTPATIENT
Start: 2023-12-05 | End: 2023-12-05

## 2023-12-05 RX ORDER — METOCLOPRAMIDE 10 MG/1
10 TABLET ORAL EVERY 6 HOURS PRN
Qty: 20 TABLET | Refills: 0 | Status: SHIPPED | OUTPATIENT
Start: 2023-12-05 | End: 2024-01-04

## 2023-12-05 RX ORDER — METOCLOPRAMIDE HYDROCHLORIDE 5 MG/ML
10 INJECTION INTRAMUSCULAR; INTRAVENOUS ONCE
Status: COMPLETED | OUTPATIENT
Start: 2023-12-05 | End: 2023-12-05

## 2023-12-05 NOTE — ED INITIAL ASSESSMENT (HPI)
C/o migraine headache that started at 4am - tried her medication for headaches - also states she has anemia

## 2023-12-05 NOTE — DISCHARGE INSTRUCTIONS
Push fluids  Continue combination Toradol and Reglan taken with food around-the-clock every 6 hours for the next 24 hours then increase the interval between doses to wean off the medication  Continue follow-up with her gynecologist    Contact your primary care doctor today to arrange close follow-up

## 2023-12-14 ENCOUNTER — TELEPHONE (OUTPATIENT)
Dept: INTERNAL MEDICINE CLINIC | Facility: CLINIC | Age: 38
End: 2023-12-14

## 2023-12-18 ENCOUNTER — OFFICE VISIT (OUTPATIENT)
Dept: HEMATOLOGY/ONCOLOGY | Facility: HOSPITAL | Age: 38
End: 2023-12-18
Attending: INTERNAL MEDICINE
Payer: COMMERCIAL

## 2023-12-18 VITALS
SYSTOLIC BLOOD PRESSURE: 112 MMHG | TEMPERATURE: 98 F | HEART RATE: 90 BPM | WEIGHT: 228.38 LBS | BODY MASS INDEX: 40 KG/M2 | DIASTOLIC BLOOD PRESSURE: 73 MMHG | OXYGEN SATURATION: 99 % | RESPIRATION RATE: 16 BRPM

## 2023-12-18 DIAGNOSIS — D50.0 IRON DEFICIENCY ANEMIA DUE TO CHRONIC BLOOD LOSS: Primary | ICD-10-CM

## 2023-12-18 DIAGNOSIS — D64.9 ANEMIA, UNSPECIFIED TYPE: ICD-10-CM

## 2023-12-18 LAB
DEPRECATED HBV CORE AB SER IA-ACNC: 5.6 NG/ML
FOLATE SERPL-MCNC: 64.9 NG/ML (ref 8.7–?)
IRON SATN MFR SERPL: 24 %
IRON SERPL-MCNC: 148 UG/DL
TIBC SERPL-MCNC: 629 UG/DL (ref 240–450)
TRANSFERRIN SERPL-MCNC: 422 MG/DL (ref 200–360)
VIT B12 SERPL-MCNC: 844 PG/ML (ref 193–986)

## 2023-12-18 PROCEDURE — 99205 OFFICE O/P NEW HI 60 MIN: CPT | Performed by: INTERNAL MEDICINE

## 2023-12-18 RX ORDER — MELATONIN
325
COMMUNITY

## 2023-12-18 NOTE — PROGRESS NOTES
Education Record    Learner:  Patient    Disease / Diagnosis: Anemia    Barriers / Limitations:  None   Comments:    Method:  Discussion   Comments:    General Topics:  Medication and Side effects and symptom management   Comments:    Outcome:  Shows understanding   Comments:    Here for new consult- anemia. Experiencing exhaustion, shortness of breath, leg cramps, and headaches. She had a period in November for the entire month. She then was prescribed Tranexamic acid and took it for 5 days. She has not bled since. Takes an iron supplement over the counter starting 11/26.

## 2023-12-21 ENCOUNTER — TELEPHONE (OUTPATIENT)
Dept: HEMATOLOGY/ONCOLOGY | Facility: HOSPITAL | Age: 38
End: 2023-12-21

## 2023-12-22 ENCOUNTER — TELEPHONE (OUTPATIENT)
Dept: HEMATOLOGY/ONCOLOGY | Facility: HOSPITAL | Age: 38
End: 2023-12-22

## 2023-12-29 ENCOUNTER — OFFICE VISIT (OUTPATIENT)
Dept: HEMATOLOGY/ONCOLOGY | Facility: HOSPITAL | Age: 38
End: 2023-12-29
Attending: INTERNAL MEDICINE
Payer: COMMERCIAL

## 2023-12-29 VITALS
HEART RATE: 86 BPM | TEMPERATURE: 98 F | SYSTOLIC BLOOD PRESSURE: 114 MMHG | WEIGHT: 229.19 LBS | DIASTOLIC BLOOD PRESSURE: 78 MMHG | RESPIRATION RATE: 18 BRPM | BODY MASS INDEX: 41 KG/M2 | OXYGEN SATURATION: 99 %

## 2023-12-29 DIAGNOSIS — D50.0 IRON DEFICIENCY ANEMIA DUE TO CHRONIC BLOOD LOSS: Primary | ICD-10-CM

## 2023-12-29 PROCEDURE — 96365 THER/PROPH/DIAG IV INF INIT: CPT

## 2023-12-29 PROCEDURE — 96376 TX/PRO/DX INJ SAME DRUG ADON: CPT

## 2023-12-29 NOTE — PROGRESS NOTES
Education Record    Learner:  Patient    Disease / Diagnosis: Here for infed with test dose    Barriers / Limitations:  None    Method:  Brief focused, printed material and  reinforcement    General Topics:  Plan of care reviewed    Outcome:  Shows understanding. Pt tolerated infusion. Test dose given. Labs in 2 months - appt requested.  Left in stable condition - VSS

## 2023-12-30 DIAGNOSIS — I10 PRIMARY HYPERTENSION: Primary | ICD-10-CM

## 2024-01-01 RX ORDER — PROPRANOLOL HCL 60 MG
1 CAPSULE, EXTENDED RELEASE 24HR ORAL DAILY
Qty: 90 CAPSULE | Refills: 0 | Status: SHIPPED | OUTPATIENT
Start: 2024-01-01

## 2024-01-10 ENCOUNTER — OFFICE VISIT (OUTPATIENT)
Dept: NEUROLOGY | Facility: CLINIC | Age: 39
End: 2024-01-10
Payer: COMMERCIAL

## 2024-01-10 VITALS
RESPIRATION RATE: 16 BRPM | WEIGHT: 234 LBS | SYSTOLIC BLOOD PRESSURE: 126 MMHG | BODY MASS INDEX: 41 KG/M2 | HEART RATE: 68 BPM | DIASTOLIC BLOOD PRESSURE: 74 MMHG

## 2024-01-10 DIAGNOSIS — G43.009 MIGRAINE WITHOUT AURA AND WITHOUT STATUS MIGRAINOSUS, NOT INTRACTABLE: Primary | ICD-10-CM

## 2024-01-10 PROCEDURE — 3078F DIAST BP <80 MM HG: CPT | Performed by: OTHER

## 2024-01-10 PROCEDURE — 3074F SYST BP LT 130 MM HG: CPT | Performed by: OTHER

## 2024-01-10 PROCEDURE — 99204 OFFICE O/P NEW MOD 45 MIN: CPT | Performed by: OTHER

## 2024-01-10 RX ORDER — METOCLOPRAMIDE 10 MG/1
10 TABLET ORAL AS NEEDED
Qty: 30 TABLET | Refills: 0 | Status: SHIPPED | OUTPATIENT
Start: 2024-01-10

## 2024-01-10 RX ORDER — PROPRANOLOL HYDROCHLORIDE 80 MG/1
80 CAPSULE, EXTENDED RELEASE ORAL DAILY
Qty: 30 CAPSULE | Refills: 2 | Status: SHIPPED | OUTPATIENT
Start: 2024-01-10

## 2024-01-10 NOTE — PATIENT INSTRUCTIONS
Refill policies:    Allow 2-3 business days for refills; controlled substances may take longer.  Contact your pharmacy at least 5 days prior to running out of medication and have them send an electronic request or submit request through the “request refill” option in your [employer] account.  Refills are not addressed on weekends; covering physicians do not authorize routine medications on weekends.  No narcotics or controlled substances are refilled after noon on Fridays or by on call physicians.  By law, narcotics must be electronically prescribed.  A 30 day supply with no refills is the maximum allowed.  If your prescription is due for a refill, you may be due for a follow up appointment.  To best provide you care, patients receiving routine medications need to be seen at least once a year.  Patients receiving narcotic/controlled substance medications need to be seen at least once every 3 months.  In the event that your preferred pharmacy does not have the requested medication in stock (e.g. Backordered), it is your responsibility to find another pharmacy that has the requested medication available.  We will gladly send a new prescription to that pharmacy at your request.    Scheduling Tests:    If your physician has ordered radiology tests such as MRI or CT scans, please contact Central Scheduling at [phone] right away to schedule the test.  Once scheduled, the [facility] Centralized Referral Team will work with your insurance carrier to obtain pre-certification or prior authorization.  Depending on your insurance carrier, approval may take 3-10 days.  It is highly recommended patients assure they have received an authorization before having a test performed.  If test is done without insurance authorization, patient may be responsible for the entire amount billed.      Precertification and Prior Authorizations:  If your physician has recommended that you have a procedure or additional testing performed the [facility]  Centralized Referral Team will contact your insurance carrier to obtain pre-certification or prior authorization.    You are strongly encouraged to contact your insurance carrier to verify that your procedure/test has been approved and is a COVERED benefit.  Although the [facility] Centralized Referral Team does its due diligence, the insurance carrier gives the disclaimer that \"Although the procedure is authorized, this does not guarantee payment.\"    Ultimately the patient is responsible for payment.   Thank you for your understanding in this matter.  Paperwork Completion:  If you require FMLA or disability paperwork for your recovery, please make sure to either drop it off or have it faxed to our office at [phone]. Be sure the form has your name and date of birth on it.  The form will be faxed to our Forms Department and they will complete it for you.  There is a 25$ fee for all forms that need to be filled out.  Please be aware there is a 10-14 day turnaround time.  You will need to sign a release of information (DANYA) form if your paperwork does not come with one.  You may call the Forms Department with any questions at [phone].  Their fax number is [phone].

## 2024-01-10 NOTE — PROGRESS NOTES
HPI:    Patient ID: Karlie Dash is a 38 year old female.  PCP: Dr Hughes    Thank you for referring this patient to us.  Below is a summary of my evaluation    HPI  Karlie is a 38-year-old female with history of hypertension who presented for evaluation of headaches. [facility] she has a longstanding history of migraines and 2019 the increase in severity, also neurology in [place] and was on preventive therapy. She then moved to [place] and got pregnant and the medicine was discontinued.  She she had gestational hypertension and preeclampsia and now on nifedipine and propranolol.  She states that headaches have increased gets about 1-2 per week, unilateral pain mostly on the left side associated with the blurry vision and light sensitivity. She is also being getting some tension headaches. [facility] has a stressful job, works in [employer] department.    HISTORY:  Past Medical History:   Diagnosis Date    Essential hypertension     GERD     Hypertriglyceridemia     Migraines     Obesity       History reviewed. No pertinent surgical history.   Family History   Problem Relation Age of Onset    Hypertension Mother     Depression Maternal Grandmother     Heart Disease Paternal Grandmother       Social History     Socioeconomic History    Marital status:     Number of children: 0   Occupational History    Occupation: hr asst   Tobacco Use    Smoking status: Former     Packs/day: .25     Types: Cigarettes     Quit date:      Years since quittin.0     Passive exposure: Past    Smokeless tobacco: Former    Tobacco comments:     started     Vaping Use    Vaping Use: Never used   Substance and Sexual Activity    Alcohol use: Not Currently     Alcohol/week: 0.0 - 2.0 standard drinks of alcohol     Comment: 2 per week    Drug use: No    Sexual activity: Yes     Partners: Male   Other Topics Concern    Caffeine Concern Yes     Comment: 2 servings/day    Exercise No    Seat Belt Yes        Review of Systems    Constitutional: Negative.    HENT: Negative.     Eyes: Negative.    Respiratory: Negative.     Cardiovascular: Negative.    Gastrointestinal: Negative.    Endocrine: Negative.    Genitourinary: Negative.    Musculoskeletal: Negative.    Skin: Negative.    Allergic/Immunologic: Negative.    Neurological:  Positive for headaches.   Hematological: Negative.    Psychiatric/Behavioral: Negative.     All other systems reviewed and are negative.           Current Outpatient Medications   Medication Sig Dispense Refill    PROPRANOLOL HCL ER 60 MG Oral Capsule SR 24 Hr TAKE 1 CAPSULE(60 MG) BY MOUTH DAILY 90 capsule 0    ferrous sulfate 325 (65 FE) MG Oral Tab EC Take 1 tablet (325 mg total) by mouth daily with breakfast.      Ketorolac Tromethamine 10 MG Oral Tab Take 1 tablet (10 mg total) by mouth every 6 (six) hours as needed for Pain. 20 tablet 0    tranexamic acid (LYSTEDA) 650 MG Oral Tab Take 2 tablets (1,300 mg total) by mouth every 8 (eight) hours. For up to 5 days every month with menses 90 tablet 2    NIFEDIPINE ER 60 MG Oral Tablet 24 Hr TAKE 1 TABLET(60 MG) BY MOUTH DAILY 90 tablet 0    Cholecalciferol (VITAMIN D) 25 MCG (1000 UT) Oral Tab Take by mouth As Directed.      famotidine 20 MG Oral Tab       Eletriptan Hydrobromide 40 MG Oral Tab       Esomeprazole Magnesium 20 MG Oral Capsule Delayed Release Take 1 capsule (20 mg total) by mouth every morning before breakfast.       Allergies:No Known Allergies  PHYSICAL EXAM:   Physical Exam  Blood pressure 126/74, pulse 68, resp. rate 16, weight 234 lb (106.1 kg), last menstrual period 11/01/2023, not currently breastfeeding.  General Appearance: Well nourished, well developed, no apparent distress.   HEENT: Normocephalic and atraumatic. Normal sclera.   Cardiovascular: Normal rate, regular rhythm and normal heart sounds.    Pulmonary/Chest: Effort normal and breath sounds normal.   Abdominal: Soft. Bowel sounds are normal.   Skin: dry, clean and intact  Ext:  peripheral pulses present  Psych: normal mood and affect    Neurological:  Patient is awake, alert and oriented to person, place and time   Normal memory, attention/concentration, speech and language.    Cranial Nerves:   II: Visual fields: normal  III: Pupils: equal, round, reactive to light  III,IV,VI: Extra Ocular Movements: intact  V: Facial sensation: intact  VII: Facial strength: intact  VIII: Hearing: intact  IX: Palate: intact  XI: Shoulder shrug: intact  XII: Tongue movement: normal    Motor: Normal tone. Strength is  5 out of 5 in all extremities bilaterally.  DTR: present    Sensory: Sensory examination is normal to light touch and pinprick     Coordination: Finger-to-nose test normal bilaterally without evidence of dysmetria.    Gait: normal casual gait     TESTS/IMAGING:     CT head:  Aug 2023  INDINGS:  VENTRICLES: No hydrocephalus.    EXTRA-AXIAL: No extraaxial hemorrhage.  No midline shift or herniation.    PARENCHYMA: No CT evidence of acute or subacute infarct.  No mass.  Parenchymal volume is normal.  Gray-white matter differentiation is maintained.    SOFT TISSUES: No acute abnormality.  BONES: No acute fracture.  SINUSES: Clear.  ORBITS: No acute abnormality.  MASTOIDS: Clear.  EACS: Clear.                  Impression   CONCLUSION: No acute intracranial abnormality.       ASSESSMENT/PLAN:       ICD-10-CM    1. Migraine without aura and without status migrainosus, not intractable  G43.009         Patient is a 38-year-old female with history of chronic migraines presenting for evaluation of increased headaches    Iron def anemia and stress could be contributing factors  On Iron infusion and that sometime causes headache itself    She is already on propranolol for hypertension so recommend going up on the dose slightly to 80 mg for headache prevention    Continue Eletriptan as needed. Maintain a headache diary    Thank you for allowing us to participate in your patient's care.  Total 45 minutes  spent with patient >50% of visit was spent in counseling and coordination of care      Bang Chiang MD  [facility]      Meds This Visit:  Requested Prescriptions      No prescriptions requested or ordered in this encounter       Imaging & Referrals:  None     ID#[id number]

## 2024-01-10 NOTE — PROGRESS NOTES
Patient reports migraines 1-2 per week but gets daily tension headache. Did go to PT for neck and posture exercises. Head pain usually starts in corner of head and sees bright light, then spreads all over head. Not triggered by anything. Brain feels tired after migraine breaks.

## 2024-02-06 ENCOUNTER — TELEMEDICINE (OUTPATIENT)
Dept: INTERNAL MEDICINE CLINIC | Facility: CLINIC | Age: 39
End: 2024-02-06
Payer: COMMERCIAL

## 2024-02-06 DIAGNOSIS — E78.1 HYPERTRIGLYCERIDEMIA: ICD-10-CM

## 2024-02-06 DIAGNOSIS — K21.9 GASTROESOPHAGEAL REFLUX DISEASE, UNSPECIFIED WHETHER ESOPHAGITIS PRESENT: ICD-10-CM

## 2024-02-06 DIAGNOSIS — Z51.81 ENCOUNTER FOR THERAPEUTIC DRUG MONITORING: Primary | ICD-10-CM

## 2024-02-06 DIAGNOSIS — E66.01 CLASS 3 SEVERE OBESITY WITH SERIOUS COMORBIDITY AND BODY MASS INDEX (BMI) OF 40.0 TO 44.9 IN ADULT, UNSPECIFIED OBESITY TYPE (HCC): ICD-10-CM

## 2024-02-06 DIAGNOSIS — D50.8 OTHER IRON DEFICIENCY ANEMIA: ICD-10-CM

## 2024-02-06 DIAGNOSIS — R73.03 PREDIABETES: ICD-10-CM

## 2024-02-06 PROCEDURE — 99204 OFFICE O/P NEW MOD 45 MIN: CPT | Performed by: NURSE PRACTITIONER

## 2024-02-06 RX ORDER — TIRZEPATIDE 2.5 MG/.5ML
2.5 INJECTION, SOLUTION SUBCUTANEOUS WEEKLY
Qty: 2 ML | Refills: 0 | Status: SHIPPED | OUTPATIENT
Start: 2024-02-06

## 2024-02-06 RX ORDER — TIRZEPATIDE 5 MG/.5ML
5 INJECTION, SOLUTION SUBCUTANEOUS WEEKLY
Qty: 2 ML | Refills: 1 | Status: SHIPPED | OUTPATIENT
Start: 2024-02-06

## 2024-02-06 NOTE — PATIENT INSTRUCTIONS
Welcome to the [facility]...your Lifestyle Renovation begins now!  Thank you for placing your trust in our health care team, I look forward to working with you along this journey to better health!    Next steps:     1.  Call our office at [phone] to schedule a personal nutrition consultation with one of our registered dieticians, Levy Cid. Bring along your food journal (3 days minimum). See journal options below.  2.  Complete fasting (10-12 hours, water only) labs at [facility] lab site prior to next office visit. Lab results will be communicated via eFolder.  3.  Fill your prescribed medication and take as discussed and prescribed: Start Zepbound at 2.5 mg weekly. After 4 weeks start the next dose of 5 mg weekly with additional refill. Visit the website www.zepbound.[employer] for coupon and further education on dosing. This medication may require a prior authorization (PA) by your insurance. A PA may take one week plus to complete and our office will be in touch during this process if needed. If cost prohibitive plan: generic alternative to Qsymia with phentermine and Topamax -pending EKG.  4.  Use contraception at all times while on anti-obesity medications.    Tips while taking an injectable weight loss medication:    Be an intuitive eater. Listen to your hunger and fullness signals, stopping when you are full.  Consume protein and produce in your day, striving for a rainbow of color of produce.  Reduce portions to staring size of 1 cup size and check in with your gut to see if you are full. Use a sand timer to slow down your eating pace to allow for 15-20 minutes to complete a meal.  Reduce refined sugars and high fat foods, as they may contribute to greater side effects of nausea and heartburn.  Stop eating 3 hours before bedtime to allow your food to digest.  Remain hydrated with water or non caloric and non caffeine beverages.  Use over the counter cindi  lozenge/supplement to help reduce nausea if needed.    Please try to work on the following dietary changes this first month:    1.  Drink water with meals and throughout the day, cut down on soda and/or juice if consumed. Consider flavored water options like Bubbly, Spindrift, Hint and Yrn.  2.  Have protein with each meal, examples include: greek yogurt, cottage cheese, hard boiled egg, tofu, chicken or tuna.  3.  Work towards reducing/eliminating refined carbohydrates and sugars which includes items such as sweets, as well as rice, pasta, and bread and make sure to choose whole grain options when having them with just 1 serving per meal about the size of your inner palm.  4.  Consume non starchy veggies daily working towards making them a good 50% of your daily food intake. Add them to lunch and dinner consistently.  5.  Start a daily probiotic: VSL#3 is recommended, (order on line at www.vsl3.com). Take 1 capsule daily with water for 30 days, then reduce to 1 every other day (this will reduce the cost). Capsules can be left out of refrigerator for 2 weeks. I recommend using a pill box weekly and keeping the bottle in the fridge.    Please download cynthia My Fitness Pal, LoseIt! Or Net Diary to monitor daily dietary intake and you will be able to see if you are eating the right amount of calories or too much or too little which would hinder weight loss. Additionally this will help to see your daily carbohydrate and protein intake. When you set the cynthia up choose 1-1.5 lbs/week as a goal.  Keeping a paper food journal is an option as well to remain accountable for your choices- this is the start to mindful eating! A low calorie diet has been consistently shown to support weight loss.    Continue or start exercising to help establish a routine. If not already exercising begin with 1 day/week and progress as able with the goal of working towards 30 minutes 5 days a week at a minimum. A variety or cardio, strength and  stretching is important. Review resources below to help support you in building this healthy routine.    Meditation daily can help manage and control stress. Chronic stress can make weight loss difficult.  Exercising is one way to help with stress, but meditation using the CALM Lillian or another comparable alternative can be done in your home or place of work with little time commitment. This Lillian can also help work on behavior change and improve sleep. Check out the segment under Calm Masterclass and listen to The 4 Pillars of Health. A great way to begin learning about the foundation of lifestyle with practical tips to use in your every day. In addition, we offer counseling services and support for individual connection and care. A referral is necessary so please let me know if this is a service you are interested.    Check out www.yourweightmatters.org blog for continued support and education along your weight loss journey to optimal health!      Patient Resources:    Personal Training/Fitness Classes/Health Coaching    [facility] in [place]: [facility] fitness center with group fitness and personal training located in [place].  Health Coaching with Tawana Fisher, Alonso Daniel, and Phillip Forbes at our [facility]- individual coaching to work on your health goals. Call [phone] and/or email @ [email or web address]. Free 60 minute consult when client of [employer] [facility].  MARYLOU Huitron @ http://www.Auctionata. A variety of group fitness options plus various yoga classes [phone] and/or email Malu at [email or web address]  [facility] Fitness Centers with multiple locations: vitaMedMD Fitness (www."Movero[employer] Inc.".StreetFire), F45 Training (www.o91skarciym.StreetFire), Fit Body Bootcamp (www.Bubble MotionbodybootKuwo Science and Technologyp.com), Chictini (www.Be[place] second hand information company.StreetFire), The Exercise  (www.exercisecoach.com), Club Pilates (www.clubpilates.com)    Online Fitness  Fitness  Zay on Utube  Fit in 10 DVD series   www.wiywz73PBJ.[employer] One[employer] Company  Chair exercises via Sit and Be Fit (www.sitandbefit.org) and [employer] Technologies (www.Arcot Systems.com) or Brandon Tripp or Nixon Jiménez videos on YouTube.  Hip Hop Fit with Douglas Elaine at www.hiphopfit.net    Apps for on the Go Fitness  Perry 7 Minute Workout (orange box with white 7) - free on the go HIIT training lillian  Peloton Lillian @ www.onepeloton.com    Nutrition Trackers and Programs  LoseIT! And My Fitness Pal apps and on line for tracking nutrition  NOOM - virtual health coaching  FitFoundation (healthy meals on the go) in [place] @ wwwOnstream Medialsskrykfoukzd9bSharetribe  José HARDIN @ [employer] and Eugejm93 (calorie smart and low carb plans recommended) @ www.yprgpi36.com, Metabolic Meals @ www.2DucheabolicMeals.[employer] - individual prepared meals to go  Gobble, Blue Apron, Home , Every Plate, Sunbasket- on line meal delivery programs for preparation at home  [facility]al [place] in [place] for homemade meals to go @ wwwOnstream MediamealPenthera Partners  Diet Doctor @ www.dietdoctor.com - low carb swaps  YuXL Video - meal prep and planning lillian (www.yummly.com)    Stress, Anxiety, Depression, Trauma  CALM meditation lillian (www.calm.com)  Headspace  Don't let anxiety run your life. Using the science of emotion regulation and mindfulness to overcome fear and worry by Waldo Pedro PsyD and Sonny Greene MA.  The SpeechVive Podcast (September 27, 2023): 6 Magic Words That Stop Anxiety  What Happened to You?- a look at the impact trauma has on behavior written by Dana Collins and Dr. Tutu Cochran  Whole Again by Jan Hdz - discovering your true self after trauma    Mindful Eating/The Hungry Brain  Am I Hungry? Mindful eating virtual  lillian (www.amihungry.com)  The Hungry Brain by Joi Garibay, PhD  Mindless Eating by Nabil Gomez  Weight Loss Surgery Will Not Treat Food Addiction by Sonal Olivares Ph.D    Metabolic Dysfunction, Hormones and Cravings  Why We Get Sick?  By Willie Christensen (insulin resistance)  Your Body in Balance: The New Science of Food, Hormones, and Health by Dr. Lior Boyce  The Complete Guide to fasting by Dr. Abdi  Fast Like a Girl by Dr. Deisy Hummel  The Menopause Reset by Dr. Deisy Hummel  Sugar, Salt & Fat by Kylee Hubbard, Ph.D, R.D.  The Truth About Sugar - documentary on sugar (Free on Utube, https://youinternetstoresu.be/9L5stxyWW4e)  Reverse Visceral Fat: #1 Way to Increase Your Lifespan & End Inflammation with Dr. Ciaran Young on Utube @ https://youinternetstoresu.be/nupPRnvUpJY?si=wb6ifuNnXRX6FgvG    Nutrition Plans  You Are What You Eat - Netfix series on twin study looking at impact of nutrition changes on health  The End of Dieting: How to Live for Life by Dr. Stefan Fong M.D. or listen to The Actito Podcast Episode 63: Understanding \"Nutritarian\" Eating w/Dr. Stefan Fong  The Game Changers- Netflix Documentary on plant based nutrition  The Dr. Nails T5 Wellness Plan by Dr. Joe Nails MD  The Complete Guide to fasting by Dr. Abdi    Education, Motivation and Support Resources  Live to 100: Secrets of the Blue Zones - Netflix series on the secrets to communities living over 100 years old  Atomic Habits by Gabriele Amezcua (a book about taking small steps to promote greater behavior change)   Motivation cynthia (black box with white \")- daily supportive messages sent to your phone  Can't Hurt Me by Waldo Cheatham (a book exploring the power of discipline in achieving your goals)  Fed Up - documentary about obesity (Free on Utube)  Www.yourweightmatters.org - Obesity Action Coalition sponsored Blog posts  Obesity Action Coalition Resources on topics specific to weight management (www.obesityaction.org)  Fitlosophy Fitspiration - journal to better health (journal book found at [employer] in fitness aisle)  Irasema Leonard talk titled: The Call to Courage (Netflix)  The Exam Room by the Physician's Committee (Podcast)  Nutrition Facts by Dr. Coker (Podcast)    Balanced  Nutrition includes:     Build the mentality of Food 4 Fuel. Clean eating with whole foods and eliminating/reducing ultra processed foods.  Be an intuitive eater and using mindful eating practices.  Eat a balanced plate with protein and produce at all meals: 1/4 plate- protein, 1/2 plate non starchy veggie, and 1/4 plate fruit or complex carbohydrate.  Drink water with all meals.  Eliminate/reduce late night eating by stopping after 7pm. Allowing your body to fast for 12 hours (drink only water, tea or black coffee without any additives).

## 2024-02-06 NOTE — PROGRESS NOTES
formerly [facility] Weight Management new patient consult via video visit:    Subjective    This visit is conducted using Telemedicine with live, interactive video and audio.    HISTORY OF PRESENT ILLNESS    Karlie Dash is a 38 year old female new to our office today for initiation of medical weight loss program.  Patient was referred by ob/gyne.  Patient presents today with c/o excess weight since her 20s.    Reason/goal for weight loss: lose 15# in 6 months and 50# in 1 year.    Previous weight loss efforts in the past: eating smaller meals, walking    Past 6 months lifestyle interventions: yes, regular exercise    Reviewed [facility] patient contract- reviewed on line. L- 9, M- 10, S- 7.    Barriers to weight loss: irregular meals, late night eating at times d/t work demands, portions on occasion    Wt Readings from Last 6 Encounters:   01/10/24 234 lb (106.1 kg)   12/29/23 229 lb 3.2 oz (104 kg)   12/18/23 228 lb 6.4 oz (103.6 kg)   12/05/23 220 lb (99.8 kg)   11/27/23 224 lb (101.6 kg)   11/22/23 220 lb (99.8 kg)          Social hx and lifestyle reviewed:      How many meals do you eat out per week: not reported  Who is the primary cook in your home: shared with     Please respond to the questions regarding your previous weight loss    How did you hear about the [facility]? Ob/gyne   Previous weight loss efforts in the past/medication(s):    Eating behaviors/patterns that have been barriers to weight loss success in the past: eating smaller more frequent meals. walking   Please respond to the questions regarding a 24 hour food journal.  Include the average time you ate and the quantity/food preparation method.    List foods, qty and prep for breakfast: banana and apples - 1 of each with peanut butter   List foods, qty and prep for lunch. usually don't have but if I do, I'll have two quesadillas   List foods, qty and prep for dinner. chicken thighs and grilled veggies with rice and beans   List  foods, qty and prep for snacks. small fruits, such as grapes   List the types and qty of fluids consumed Coffee 16oz and Water, 32oz   Please respond to the questions regarding lifestyle.    Tobacco use: No   Alcohol use: How many servings per week? 0   Supplements taken on a regular basis include: vitamin b complex, prenatal, probiotic, magnesium, vitamin d, iron supplement   Please respond to the questions regarding exercise/activity    How many days per week are you active or exercise 2   What type of activities: walking   Perceived level of exertion on a scale of 1-5, with 5 being very intense: 2   Average stress level on a scale of 1-10, with 10 being extremely stressed: 8   How do you cope with stress: doom scrolling at worst, journaling at best.   Please respond to the questions regarding sleep    How many hours of uninterrupted sleep do you get a night: 8   How many times do you wake up in the night: 3   Do you feel rested in the morning: No   Do you snore: No   Do you have sleep apnea: No   Do you use: Dental Device     Work: [employer] working from home  Marital status:  with child  Support: yes    MEDICAL HISTORY  PMH reviewed:   Cardiac disorders: negative  Depression/anxiety: negative  Glaucoma: negative  Kidney stones: negative  Eating disorder: negative  Migraines: yes - propranolol  Seizures: negative  Joint-related conditions: negative  Liver disease: negative  Renal disease: negative  Diabetes: prediabetes  Thyroid disease: negative  Constipation: negative  Other pertinent hx: GERD, Anemia- reported d/t heavy menses  Sleep Apnea hx: negative  Cancer hx: negative  Cholecystectomy and/or gallstones: negative  Family or personal history of Pancreatic issues / Medullary Thyroid Cancer/MENS 2: negative  History of bariatric surgery: negative    FMH reviewed: obesity in parent/s or sibling: no    REVIEW OF SYSTEMS  GENERAL: feels well otherwise  SKIN: denies any rashes to skin folds  HEENT: snoring-  no  LUNGS: denies shortness of breath with exertion, no apnea  CARDIOVASCULAR: denies chest pain on exertion, denies palpitations or pedal edema  GI: denies abdominal pain.  No N/V/D/C  MUSCULOSKELETAL: denies joint pains  NEURO: denies headaches  ENDOCRINE: denies any excess hunger, urination or thirst, denies any purple striae  PSYCH: denies change in behavior or mood, denies feeling sad or depressed.    EXAM    Patient reported home weight: 234#. EMR VS dated 1/10/24 reviewed with reading of BP: 126/74, pulse: 68, weight: 234#, height: 5 feet, 3 inches, BMI: 41.5. LMP: 1/16/24    Physical Exam:  General: Patient speaking in full sentences.  alert, appears stated age, cooperative, no distress, and morbidly obese  Resp: No increased work of breathing  Neuro: alert and oriented x3  Psych: patient appears cheerful, smiling, making good eye contact      Lab Results   Component Value Date    WBC 6.1 12/05/2023    RBC 3.07 (L) 12/05/2023    HGB 8.3 (L) 12/05/2023    HCT 26.0 (L) 12/05/2023    MCV 84.7 12/05/2023    MCH 27.0 12/05/2023    MCHC 31.9 12/05/2023    RDW 13.5 12/05/2023    .0 12/05/2023     Lab Results   Component Value Date     (H) 11/22/2023    BUN 21 11/22/2023    CREATSERUM 0.95 11/22/2023    ANIONGAP 6 11/22/2023    GFR >59 08/20/2010    CA 8.8 11/22/2023    OSMOCALC 292 11/22/2023    ALKPHO 101 (H) 11/22/2023    AST 20 11/22/2023    ALT 25 11/22/2023    BILT 0.2 11/22/2023    TP 6.8 11/22/2023    ALB 3.3 (L) 11/22/2023    GLOBULIN 3.5 11/22/2023    AGRATIO 1.9 06/15/2015     11/22/2023    K 4.1 11/22/2023     11/22/2023    CO2 23.0 11/22/2023     No results found for: \"EAG\", \"A1C\"  Lab Results   Component Value Date    CHOLEST 166 08/11/2023    TRIG 226 (H) 08/11/2023    HDL 43 08/11/2023    LDL 85 08/11/2023    VLDL 36 (H) 08/11/2023    NONHDLC 123 08/11/2023     Lab Results   Component Value Date    TSH 0.955 08/11/2023     Lab Results   Component Value Date    B12 844  12/18/2023     No results found for: \"VITD\", \"QVITD\", \"HMPU43HQ\"    Current Outpatient Medications on File Prior to Visit   Medication Sig Dispense Refill    Propranolol HCl ER 80 MG Oral Capsule SR 24 Hr Take 1 capsule (80 mg total) by mouth daily. 30 capsule 2    metoclopramide 10 MG Oral Tab Take 1 tablet (10 mg total) by mouth as needed. 30 tablet 0    ferrous sulfate 325 (65 FE) MG Oral Tab EC Take 1 tablet (325 mg total) by mouth daily with breakfast.      Ketorolac Tromethamine 10 MG Oral Tab Take 1 tablet (10 mg total) by mouth every 6 (six) hours as needed for Pain. 20 tablet 0    tranexamic acid (LYSTEDA) 650 MG Oral Tab Take 2 tablets (1,300 mg total) by mouth every 8 (eight) hours. For up to 5 days every month with menses 90 tablet 2    NIFEDIPINE ER 60 MG Oral Tablet 24 Hr TAKE 1 TABLET(60 MG) BY MOUTH DAILY 90 tablet 0    Cholecalciferol (VITAMIN D) 25 MCG (1000 UT) Oral Tab Take by mouth As Directed.      famotidine 20 MG Oral Tab       Eletriptan Hydrobromide 40 MG Oral Tab       Esomeprazole Magnesium 20 MG Oral Capsule Delayed Release Take 1 capsule (20 mg total) by mouth every morning before breakfast.       No current facility-administered medications on file prior to visit.       ASSESSMENT  Initial Weight Data and Goal Weight Loss:       Diagnoses and all orders for this visit:    Encounter for therapeutic drug monitoring  -     Vitamin D; Future  -     Vitamin B12; Future  -     Hemoglobin A1C; Future  -     Lipid Panel; Future  -     TSH and Free T4; Future  -     Tirzepatide-Weight Management (ZEPBOUND) 2.5 MG/0.5ML Subcutaneous Solution Auto-injector; Inject 2.5 mg into the skin once a week.  -     Tirzepatide-Weight Management (ZEPBOUND) 5 MG/0.5ML Subcutaneous Solution Auto-injector; Inject 5 mg into the skin once a week. Start after completing full 4 weeks on 2.5 mg weekly dose.    Class 3 severe obesity with serious comorbidity and body mass index (BMI) of 40.0 to 44.9 in adult,  unspecified obesity type (HCC)  - Start Zepbound as directed and reviewed alternative options if cost prohibitive.  - Reviewed balanced plate nutrition with focus on whole food, regular meals daily that include protein and produce and eliminating/reducing late night eating.  - Counseled on the 4 Pillars of health (sleep, stress, nutrition and fitness).  - Instructed to use contraception at all times while on AOMs.  -     OP REFERRAL TO DI[facility]IAN [facility] ([facility] USE ONLY)  -     Vitamin D; Future  -     Vitamin B12; Future  -     Hemoglobin A1C; Future  -     Lipid Panel; Future  -     TSH and Free T4; Future  -     Tirzepatide-Weight Management (ZEPBOUND) 2.5 MG/0.5ML Subcutaneous Solution Auto-injector; Inject 2.5 mg into the skin once a week.  -     Tirzepatide-Weight Management (ZEPBOUND) 5 MG/0.5ML Subcutaneous Solution Auto-injector; Inject 5 mg into the skin once a week. Start after completing full 4 weeks on 2.5 mg weekly dose.    Prediabetes  -     OP REFERRAL TO DI[facility]IAN [facility] ([facility] USE ONLY)  -     Vitamin D; Future  -     Vitamin B12; Future  -     Hemoglobin A1C; Future  -     Lipid Panel; Future  -     TSH and Free T4; Future  -     Tirzepatide-Weight Management (ZEPBOUND) 2.5 MG/0.5ML Subcutaneous Solution Auto-injector; Inject 2.5 mg into the skin once a week.  -     Tirzepatide-Weight Management (ZEPBOUND) 5 MG/0.5ML Subcutaneous Solution Auto-injector; Inject 5 mg into the skin once a week. Start after completing full 4 weeks on 2.5 mg weekly dose.    Gastroesophageal reflux disease, unspecified whether esophagitis present  - reviewed SEs on Zepbound and advised monitoring for stability  -     OP REFERRAL TO [facility] ([facility] USE ONLY)  -     Vitamin D; Future  -     Vitamin B12; Future  -     Hemoglobin A1C; Future  -     Lipid Panel; Future  -     TSH and Free T4; Future    Hypertriglyceridemia  -     OP REFERRAL TO [facility] ([facility] USE ONLY)  -     Vitamin D; Future  -     Vitamin B12;  Future  -     Hemoglobin A1C; Future  -     Lipid Panel; Future  -     TSH and Free T4; Future  -     Tirzepatide-Weight Management (ZEPBOUND) 2.5 MG/0.5ML Subcutaneous Solution Auto-injector; Inject 2.5 mg into the skin once a week.  -     Tirzepatide-Weight Management (ZEPBOUND) 5 MG/0.5ML Subcutaneous Solution Auto-injector; Inject 5 mg into the skin once a week. Start after completing full 4 weeks on 2.5 mg weekly dose.    Other iron deficiency anemia  -     OP REFERRAL TO [facility] ([facility] USE ONLY)  -     Vitamin B12; Future  -     TSH and Free T4; Future        PLAN  Medication use and side effects reviewed with patient.  Medication contraindications: none foreseen  Follow up with dietitian and psychologist as recommended.  Discussed the role of sleep and stress in weight management.  Labs orders as above.  Counseled on comprehensive weight loss plan including attention to nutrition, exercise and behavior/stress management for success. See patient instruction below for more details.    Patient Instructions   Welcome to the [facility]...your Lifestyle Renovation begins now!  Thank you for placing your trust in our health care team, I look forward to working with you along this journey to better health!    Next steps:     1.  Call our office at [phone] to schedule a personal nutrition consultation with one of our registered dieticians, Levy or Palak. Bring along your food journal (3 days minimum). See journal options below.  2.  Complete fasting (10-12 hours, water only) labs at [facility] lab site prior to next office visit. Lab results will be communicated via [facility] [employer].  3.  Fill your prescribed medication and take as discussed and prescribed: Start Zepbound at 2.5 mg weekly. After 4 weeks start the next dose of 5 mg weekly with additional refill. Visit the website www.zepbound.ScaleOut Software.LiveRamp for coupon and further education on dosing. This medication may require a prior  authorization (PA) by your insurance. A PA may take one week plus to complete and our office will be in touch during this process if needed. If cost prohibitive plan: generic alternative to Qsymia with phentermine and Topamax -pending EKG.  4.  Use contraception at all times while on anti-obesity medications.    Tips while taking an injectable weight loss medication:    Be an intuitive eater. Listen to your hunger and fullness signals, stopping when you are full.  Consume protein and produce in your day, striving for a rainbow of color of produce.  Reduce portions to staring size of 1 cup size and check in with your gut to see if you are full. Use a sand timer to slow down your eating pace to allow for 15-20 minutes to complete a meal.  Reduce refined sugars and high fat foods, as they may contribute to greater side effects of nausea and heartburn.  Stop eating 3 hours before bedtime to allow your food to digest.  Remain hydrated with water or non caloric and non caffeine beverages.  Use over the counter cindi lozenge/supplement to help reduce nausea if needed.    Please try to work on the following dietary changes this first month:    1.  Drink water with meals and throughout the day, cut down on soda and/or juice if consumed. Consider flavored water options like Bubbly, Spindrift, Hint and Yrn.  2.  Have protein with each meal, examples include: greek yogurt, cottage cheese, hard boiled egg, tofu, chicken or tuna.  3.  Work towards reducing/eliminating refined carbohydrates and sugars which includes items such as sweets, as well as rice, pasta, and bread and make sure to choose whole grain options when having them with just 1 serving per meal about the size of your inner palm.  4.  Consume non starchy veggies daily working towards making them a good 50% of your daily food intake. Add them to lunch and dinner consistently.  5.  Start a daily probiotic: VSL#3 is recommended, (order on line at www.vsl3.com).  Take 1 capsule daily with water for 30 days, then reduce to 1 every other day (this will reduce the cost). Capsules can be left out of refrigerator for 2 weeks. I recommend using a pill box weekly and keeping the bottle in the fridge.    Please download lillian My Fitness Pal, LoseIt! Or Net Diary to monitor daily dietary intake and you will be able to see if you are eating the right amount of calories or too much or too little which would hinder weight loss. Additionally this will help to see your daily carbohydrate and protein intake. When you set the lillian up choose 1-1.5 lbs/week as a goal.  Keeping a paper food journal is an option as well to remain accountable for your choices- this is the start to mindful eating! A low calorie diet has been consistently shown to support weight loss.    Continue or start exercising to help establish a routine. If not already exercising begin with 1 day/week and progress as able with the goal of working towards 30 minutes 5 days a week at a minimum. A variety or cardio, strength and stretching is important. Review resources below to help support you in building this healthy routine.    Meditation daily can help manage and control stress. Chronic stress can make weight loss difficult.  Exercising is one way to help with stress, but meditation using the CALM Lillian or another comparable alternative can be done in your home or place of work with little time commitment. This Lillian can also help work on behavior change and improve sleep. Check out the segment under Calm Masterclass and listen to The 4 Pillars of Health. A great way to begin learning about the foundation of lifestyle with practical tips to use in your every day. In addition, we offer counseling services and support for individual connection and care. A referral is necessary so please let me know if this is a service you are interested.    Check out www.yourweightmatters.org blog for continued support and education along your  weight loss journey to optimal health!      Patient Resources:    Personal Training/Fitness Classes/Health Coaching    [facility] in [place]: Full fitness center with group fitness and personal training located in [place].  Health Coaching with Tawana Fisher, Alonso Daniel, and Phillip Forbes at our [facility]- individual coaching to work on your health goals. Call [phone] and/or email @ [email or web address]. Free 60 minute consult when client of [facility].  [facility] [place] @ http://www.Picooc Technology. A variety of group fitness options plus various yoga classes [phone] and/or email Malu at [email or web address]  [facility]itness Centers with multiple locations: PBS-Bio (www.Advanced Medical Innovations), Dropcam5 Training (www.Mevvy.Pure Nootropics), Bazari Body Bootcamp (www.Just Above Cost.Pure Nootropics), ShiftPlanning (www.Templafy), The Exercise  (www.pMDsoftcoach.com), Club Pilates (www.clubMeasurement Analytics.Pure Nootropics)    Online Fitness  Fitness  on ZeroMail  Fit in 10 DVD series   www.atVenu  Chair exercises via Sit and Be Fit (www.sitandbefit.org) and Orbis Education (www.Groupspeak.com) or Brandon Tirpp or Nixon Jiménez videos on YouTube.  Hip Hop Fit with Douglas Elaine at www.hiphopfit.Unilife Corporation    Apps for on the Go Fitness  Garrison 7 Minute Workout (orange box with white 7) - free on the go HIIT training lillian  Peloton Lillian @ www.onepeloton.com    Nutrition Trackers and Programs  LoseIT! And My Fitness Pal apps and on line for tracking nutrition  NOOM - virtual health coaching  FitFoundation (healthy meals on the go) in [place] @ www.sijxbtnnbpdmk4o.Pure Nootropics  José HARDIN @ www.bistromd.Pure Nootropics and Tyvbdj26 (calorie smart and low carb plans recommended) @ www.mgffap76.com, Metabolic Meals @ www.MaxcyteMetabolicMeals.com - individual prepared meals to go  Gobble, Blue Apron, Home , Every Plate, Sunbasket- on line meal delivery programs for  preparation at home  Meal Village in [place] for homemade meals to go @ www.ERN.Huggler.com  Diet Doctor @ www.dietdoctor.com - low carb swaps  YummJMB Energie - meal prep and planning cynthia (www.yummly.com)    Stress, Anxiety, Depression, Trauma  CALM meditation cynthia (www.calm.com)  Headspace  Don't let anxiety run your life. Using the science of emotion regulation and mindfulness to overcome fear and worry by Waldo Pedro PsyD and Sonny Greene MA.  The Latinda Podcast (September 27, 2023): 6 Magic Words That Stop Anxiety  What Happened to You?- a look at the impact trauma has on behavior written by Dana Clolins and Dr. Tutu Cochran  Whole Again by Jan Hdz - discovering your true self after trauma    Mindful Eating/The Hungry Brain  Am I Hungry? Mindful eating virtual  cynthia (www.NVELO.com)  The Hungry Brain by Joi Garibay, PhD  Mindless Eating by Nabil Gomez  Weight Loss Surgery Will Not Treat Food Addiction by Sonal Olivares Ph.D    Metabolic Dysfunction, Hormones and Cravings  Why We Get Sick? By Willie Christensen (insulin resistance)  Your Body in Balance: The New Science of Food, Hormones, and Health by Dr. Lior Boyce  The Complete Guide to fasting by Dr. Abdi  Fast Like a Girl by Dr. Deisy Hummel  The Menopause Reset by Dr. Deisy Hummel  Sugar, Salt & Fat by Kylee Hubbard, Ph.D, R.D.  The Truth About Sugar - documentary on sugar (Free on G.I. Java, https://youTerraSpark Geosciencesu.be/4U1pwkvTZ3j)  Reverse Visceral Fat: #1 Way to Increase Your Lifespan & End Inflammation with Dr. Ciaran Young on Utube @ https://youTerraSpark Geosciencesu.be/nupPRnvUpJY?si=zz4jqwOhSSF2IpyB    Nutrition Plans  You Are What You Eat - Netfix series on twin study looking at impact of nutrition changes on health  The End of Dieting: How to Live for Life by Dr. Stefan Fong M.D. or listen to The Ateneo Digital Podcast Episode 63: Understanding \"Nutritarian\" Eating w/Dr. Stefan Fong  The Game Changers- Moda Operandi Documentary on plant based nutrition  The  Dr. Nails T5 Wellness Plan by Dr. Joe Nails MD  The Complete Guide to fasting by Dr. Abdi    Education, Motivation and Support Resources  Live to 100: Secrets of the Blue Zones - Netflix series on the secrets to communities living over 100 years old  Atomic Habits by Gabriele Amezcua (a book about taking small steps to promote greater behavior change)   Motivation cynthia (black box with white \")- daily supportive messages sent to your phone  Can't Hurt Me by Waldo Cheatham (a book exploring the power of discipline in achieving your goals)  Fed Up - documentary about obesity (Free on Utube)  Www.yourweightmatters.org - Obesity Action Coalition sponsored Blog posts  Obesity Action Coalition Resources on topics specific to weight management (www.obesityaction.org)  Fitlosophy Fitspiration - journal to better health (journal book found at [employer] in fitness aisle)  Irasema Leonard talk titled: The Call to Courage (Netflix)  The Exam Room by the Physician's Committee (Podcast)  Nutrition Facts by Dr. Coker (Podcast)    Balanced Nutrition includes:     Build the mentality of Food 4 Fuel. Clean eating with whole foods and eliminating/reducing ultra processed foods.  Be an intuitive eater and using mindful eating practices.  Eat a balanced plate with protein and produce at all meals: 1/4 plate- protein, 1/2 plate non starchy veggie, and 1/4 plate fruit or complex carbohydrate.  Drink water with all meals.  Eliminate/reduce late night eating by stopping after 7pm. Allowing your body to fast for 12 hours (drink only water, tea or black coffee without any additives).                Return in about 3 months (around 5/6/2024) for weight management via clinic.    Patient verbalizes understanding.    Mel Go, APRN  2/6/2024    DOCUMENTATION OF TIME SPENT: Code selection for this visit was based on time spent : 50 minutes on date of service in preparing to see the patient, obtaining and/or reviewing separately obtained history,  performing a medically appropriate examination, counseling and educating the patient/family/caregiver, ordering medications or testing, referring and communicating with other healthcare providers, documenting clinical information in the electronic medical record, independently interpreting results and communicating results to the patient/family/caregiver and care coordination with the patient's other providers.

## 2024-02-16 ENCOUNTER — LAB ENCOUNTER (OUTPATIENT)
Dept: LAB | Age: 39
End: 2024-02-16
Attending: NURSE PRACTITIONER
Payer: COMMERCIAL

## 2024-02-16 ENCOUNTER — NURSE ONLY (OUTPATIENT)
Dept: HEMATOLOGY/ONCOLOGY | Facility: HOSPITAL | Age: 39
End: 2024-02-16
Attending: INTERNAL MEDICINE
Payer: COMMERCIAL

## 2024-02-16 DIAGNOSIS — R73.03 PREDIABETES: ICD-10-CM

## 2024-02-16 DIAGNOSIS — E78.1 HYPERTRIGLYCERIDEMIA: ICD-10-CM

## 2024-02-16 DIAGNOSIS — E66.01 CLASS 3 SEVERE OBESITY WITH SERIOUS COMORBIDITY AND BODY MASS INDEX (BMI) OF 40.0 TO 44.9 IN ADULT, UNSPECIFIED OBESITY TYPE (HCC): ICD-10-CM

## 2024-02-16 DIAGNOSIS — K21.9 GASTROESOPHAGEAL REFLUX DISEASE, UNSPECIFIED WHETHER ESOPHAGITIS PRESENT: ICD-10-CM

## 2024-02-16 DIAGNOSIS — D50.0 IRON DEFICIENCY ANEMIA DUE TO CHRONIC BLOOD LOSS: ICD-10-CM

## 2024-02-16 DIAGNOSIS — Z51.81 ENCOUNTER FOR THERAPEUTIC DRUG MONITORING: ICD-10-CM

## 2024-02-16 DIAGNOSIS — D50.8 OTHER IRON DEFICIENCY ANEMIA: ICD-10-CM

## 2024-02-16 LAB
BASOPHILS # BLD AUTO: 0.05 X10(3) UL (ref 0–0.2)
BASOPHILS NFR BLD AUTO: 0.9 %
CHOLEST SERPL-MCNC: 139 MG/DL (ref ?–200)
DEPRECATED HBV CORE AB SER IA-ACNC: 36.4 NG/ML
EOSINOPHIL # BLD AUTO: 0.25 X10(3) UL (ref 0–0.7)
EOSINOPHIL NFR BLD AUTO: 4.4 %
ERYTHROCYTE [DISTWIDTH] IN BLOOD BY AUTOMATED COUNT: 21.4 %
FASTING PATIENT LIPID ANSWER: YES
HCT VFR BLD AUTO: 39.7 %
HDLC SERPL-MCNC: 33 MG/DL (ref 40–59)
HGB BLD-MCNC: 13.5 G/DL
IMM GRANULOCYTES # BLD AUTO: 0.01 X10(3) UL (ref 0–1)
IMM GRANULOCYTES NFR BLD: 0.2 %
IRON SATN MFR SERPL: 16 %
IRON SERPL-MCNC: 68 UG/DL
LDLC SERPL CALC-MCNC: 61 MG/DL (ref ?–100)
LYMPHOCYTES # BLD AUTO: 1.94 X10(3) UL (ref 1–4)
LYMPHOCYTES NFR BLD AUTO: 34.3 %
MCH RBC QN AUTO: 27.1 PG (ref 26–34)
MCHC RBC AUTO-ENTMCNC: 34 G/DL (ref 31–37)
MCV RBC AUTO: 79.6 FL
MONOCYTES # BLD AUTO: 0.51 X10(3) UL (ref 0.1–1)
MONOCYTES NFR BLD AUTO: 9 %
NEUTROPHILS # BLD AUTO: 2.89 X10 (3) UL (ref 1.5–7.7)
NEUTROPHILS # BLD AUTO: 2.89 X10(3) UL (ref 1.5–7.7)
NEUTROPHILS NFR BLD AUTO: 51.2 %
NONHDLC SERPL-MCNC: 106 MG/DL (ref ?–130)
PLATELET # BLD AUTO: 241 10(3)UL (ref 150–450)
RBC # BLD AUTO: 4.99 X10(6)UL
T4 FREE SERPL-MCNC: 1.1 NG/DL (ref 0.8–1.7)
TIBC SERPL-MCNC: 432 UG/DL (ref 240–450)
TRANSFERRIN SERPL-MCNC: 290 MG/DL (ref 200–360)
TRIGL SERPL-MCNC: 284 MG/DL (ref 30–149)
TSI SER-ACNC: 2.52 MIU/ML (ref 0.36–3.74)
VIT B12 SERPL-MCNC: 786 PG/ML (ref 193–986)
VIT D+METAB SERPL-MCNC: 30.9 NG/ML (ref 30–100)
VLDLC SERPL CALC-MCNC: 42 MG/DL (ref 0–30)
WBC # BLD AUTO: 5.7 X10(3) UL (ref 4–11)

## 2024-02-16 PROCEDURE — 83550 IRON BINDING TEST: CPT

## 2024-02-16 PROCEDURE — 83540 ASSAY OF IRON: CPT

## 2024-02-16 PROCEDURE — 83036 HEMOGLOBIN GLYCOSYLATED A1C: CPT

## 2024-02-16 PROCEDURE — 82306 VITAMIN D 25 HYDROXY: CPT

## 2024-02-16 PROCEDURE — 82607 VITAMIN B-12: CPT

## 2024-02-16 PROCEDURE — 84439 ASSAY OF FREE THYROXINE: CPT

## 2024-02-16 PROCEDURE — 80061 LIPID PANEL: CPT

## 2024-02-16 PROCEDURE — 82728 ASSAY OF FERRITIN: CPT

## 2024-02-16 PROCEDURE — 85025 COMPLETE CBC W/AUTO DIFF WBC: CPT

## 2024-02-16 PROCEDURE — 36415 COLL VENOUS BLD VENIPUNCTURE: CPT

## 2024-02-16 PROCEDURE — 84443 ASSAY THYROID STIM HORMONE: CPT

## 2024-02-17 LAB — HGBA1C: 6.1 %

## 2024-03-20 PROBLEM — Z51.81 ENCOUNTER FOR THERAPEUTIC DRUG MONITORING: Status: RESOLVED | Noted: 2024-02-06 | Resolved: 2024-03-20

## 2024-03-20 PROBLEM — Z87.59 HISTORY OF PRE-ECLAMPSIA: Status: ACTIVE | Noted: 2024-03-20

## 2024-04-03 RX ORDER — MEDROXYPROGESTERONE ACETATE 10 MG/1
20 TABLET ORAL 3 TIMES DAILY
Qty: 42 TABLET | Refills: 0 | Status: SHIPPED | OUTPATIENT
Start: 2024-04-03 | End: 2024-04-10

## 2024-04-04 DIAGNOSIS — Z51.81 ENCOUNTER FOR THERAPEUTIC DRUG MONITORING: ICD-10-CM

## 2024-04-04 DIAGNOSIS — E78.1 HYPERTRIGLYCERIDEMIA: ICD-10-CM

## 2024-04-04 DIAGNOSIS — R73.03 PREDIABETES: ICD-10-CM

## 2024-04-04 DIAGNOSIS — E66.01 CLASS 3 SEVERE OBESITY WITH SERIOUS COMORBIDITY AND BODY MASS INDEX (BMI) OF 40.0 TO 44.9 IN ADULT, UNSPECIFIED OBESITY TYPE (HCC): ICD-10-CM

## 2024-04-05 NOTE — TELEPHONE ENCOUNTER
Requesting   Requested Prescriptions     Pending Prescriptions Disp Refills    Tirzepatide-Weight Management (ZEPBOUND) 5 MG/0.5ML Subcutaneous Solution Auto-injector 2 mL 1     Sig: Inject 5 mg into the skin once a week. Start after completing full 4 weeks on 2.5 mg weekly dose.     LOV: 2/6/24  RTC: 3 months  Filled: 2/21/24 #2 with 1 refills    Future Appointments   Date Time Provider Department Center   4/25/2024  9:45 AM [facility] US [facility] [place]   5/22/2024  3:40 PM Bang Chiang MD [facility] [place]   5/30/2024  4:20 PM Mel Go APRN [facility] [address]   6/10/2024  2:30 PM Marcelo Vaughn MD [facility] P [facility] [address]

## 2024-04-09 DIAGNOSIS — G43.009 MIGRAINE WITHOUT AURA AND WITHOUT STATUS MIGRAINOSUS, NOT INTRACTABLE: Primary | ICD-10-CM

## 2024-04-09 NOTE — TELEPHONE ENCOUNTER
Medication: PROPRANOLOL HCL ER 80 MG Oral Capsule SR 24 Hr      Date of last refill: 01/10/2024 (#30/2)  [place]     Last office visit: 01/10/2024  Due back to clinic per last office note:  Around 04/10/2024  Date next office visit scheduled:    Future Appointments   Date Time Provider Department Center   4/25/2024  9:45 AM [facility] US [facility] [place]   5/22/2024  3:40 PM Bang Chiang MD [facility] [place]   5/30/2024  4:20 PM Mel Go APRN [facility]   6/10/2024  2:30 PM Marcelo Vaughn MD [facility] P [facility] [address]           Last OV note recommendation:    ASSESSMENT/PLAN:          ICD-10-CM     1. Migraine without aura and without status migrainosus, not intractable  G43.009            Patient is a 38-year-old female with history of chronic migraines presenting for evaluation of increased headaches    Iron def anemia and stress could be contributing factors  On Iron infusion and that sometime causes headache itself     She is already on propranolol for hypertension so recommend going up on the dose slightly to 80 mg for headache prevention     Continue Eletriptan as needed. Maintain a headache diary     Thank you for allowing us to participate in your patient's care.  Total 45 minutes spent with patient >50% of visit was spent in counseling and coordination of care        Bang Chiang MD  [facility]

## 2024-04-11 RX ORDER — TIRZEPATIDE 5 MG/.5ML
5 INJECTION, SOLUTION SUBCUTANEOUS WEEKLY
Qty: 2 ML | Refills: 1 | Status: SHIPPED | OUTPATIENT
Start: 2024-04-11

## 2024-04-11 RX ORDER — PROPRANOLOL HYDROCHLORIDE 80 MG/1
1 CAPSULE, EXTENDED RELEASE ORAL DAILY
Qty: 30 CAPSULE | Refills: 2 | Status: SHIPPED | OUTPATIENT
Start: 2024-04-11

## 2024-04-25 ENCOUNTER — HOSPITAL ENCOUNTER (EMERGENCY)
Facility: HOSPITAL | Age: 39
Discharge: HOME | End: 2024-04-25
Attending: EMERGENCY MEDICINE
Payer: COMMERCIAL

## 2024-04-25 ENCOUNTER — ULTRASOUND ENCOUNTER (OUTPATIENT)
Dept: OBGYN CLINIC | Facility: CLINIC | Age: 39
End: 2024-04-25
Payer: COMMERCIAL

## 2024-04-25 VITALS
OXYGEN SATURATION: 99 % | RESPIRATION RATE: 16 BRPM | SYSTOLIC BLOOD PRESSURE: 147 MMHG | HEART RATE: 78 BPM | TEMPERATURE: 98 F | DIASTOLIC BLOOD PRESSURE: 98 MMHG

## 2024-04-25 DIAGNOSIS — N93.9 ABNORMAL UTERINE BLEEDING (AUB): Primary | ICD-10-CM

## 2024-04-25 DIAGNOSIS — N93.8 DYSFUNCTIONAL UTERINE BLEEDING: Primary | ICD-10-CM

## 2024-04-25 LAB
ALBUMIN SERPL-MCNC: 3.9 G/DL (ref 3.4–5)
ALBUMIN/GLOB SERPL: 1.2 {RATIO} (ref 1–2)
ALP LIVER SERPL-CCNC: 74 U/L
ALT SERPL-CCNC: 17 U/L
ANION GAP SERPL CALC-SCNC: 5 MMOL/L (ref 0–18)
ANTIBODY SCREEN: NEGATIVE
AST SERPL-CCNC: 15 U/L (ref 15–37)
BASOPHILS # BLD AUTO: 0.06 X10(3) UL (ref 0–0.2)
BASOPHILS NFR BLD AUTO: 1 %
BILIRUB SERPL-MCNC: 0.4 MG/DL (ref 0.1–2)
BUN BLD-MCNC: 12 MG/DL (ref 9–23)
CALCIUM BLD-MCNC: 9.1 MG/DL (ref 8.5–10.1)
CHLORIDE SERPL-SCNC: 108 MMOL/L (ref 98–112)
CO2 SERPL-SCNC: 24 MMOL/L (ref 21–32)
CREAT BLD-MCNC: 0.84 MG/DL
EGFRCR SERPLBLD CKD-EPI 2021: 91 ML/MIN/1.73M2 (ref 60–?)
EOSINOPHIL # BLD AUTO: 0.26 X10(3) UL (ref 0–0.7)
EOSINOPHIL NFR BLD AUTO: 4.2 %
ERYTHROCYTE [DISTWIDTH] IN BLOOD BY AUTOMATED COUNT: 14.8 %
GLOBULIN PLAS-MCNC: 3.2 G/DL (ref 2.8–4.4)
GLUCOSE BLD-MCNC: 89 MG/DL (ref 70–99)
HCT VFR BLD AUTO: 31.1 %
HGB BLD-MCNC: 9.9 G/DL
IMM GRANULOCYTES # BLD AUTO: 0.01 X10(3) UL (ref 0–1)
IMM GRANULOCYTES NFR BLD: 0.2 %
LYMPHOCYTES # BLD AUTO: 1.67 X10(3) UL (ref 1–4)
LYMPHOCYTES NFR BLD AUTO: 26.9 %
MCH RBC QN AUTO: 27.3 PG (ref 26–34)
MCHC RBC AUTO-ENTMCNC: 31.8 G/DL (ref 31–37)
MCV RBC AUTO: 85.7 FL
MONOCYTES # BLD AUTO: 0.58 X10(3) UL (ref 0.1–1)
MONOCYTES NFR BLD AUTO: 9.4 %
NEUTROPHILS # BLD AUTO: 3.62 X10 (3) UL (ref 1.5–7.7)
NEUTROPHILS # BLD AUTO: 3.62 X10(3) UL (ref 1.5–7.7)
NEUTROPHILS NFR BLD AUTO: 58.3 %
OSMOLALITY SERPL CALC.SUM OF ELEC: 283 MOSM/KG (ref 275–295)
PLATELET # BLD AUTO: 300 10(3)UL (ref 150–450)
POTASSIUM SERPL-SCNC: 4.3 MMOL/L (ref 3.5–5.1)
PROT SERPL-MCNC: 7.1 G/DL (ref 6.4–8.2)
RBC # BLD AUTO: 3.63 X10(6)UL
RH BLOOD TYPE: POSITIVE
SODIUM SERPL-SCNC: 137 MMOL/L (ref 136–145)
WBC # BLD AUTO: 6.2 X10(3) UL (ref 4–11)

## 2024-04-25 PROCEDURE — 99285 EMERGENCY DEPT VISIT HI MDM: CPT

## 2024-04-25 PROCEDURE — 96374 THER/PROPH/DIAG INJ IV PUSH: CPT

## 2024-04-25 PROCEDURE — 85025 COMPLETE CBC W/AUTO DIFF WBC: CPT | Performed by: EMERGENCY MEDICINE

## 2024-04-25 PROCEDURE — 86901 BLOOD TYPING SEROLOGIC RH(D): CPT | Performed by: EMERGENCY MEDICINE

## 2024-04-25 PROCEDURE — 86900 BLOOD TYPING SEROLOGIC ABO: CPT | Performed by: EMERGENCY MEDICINE

## 2024-04-25 PROCEDURE — 99284 EMERGENCY DEPT VISIT MOD MDM: CPT

## 2024-04-25 PROCEDURE — 80053 COMPREHEN METABOLIC PANEL: CPT | Performed by: EMERGENCY MEDICINE

## 2024-04-25 PROCEDURE — 76830 TRANSVAGINAL US NON-OB: CPT | Performed by: STUDENT IN AN ORGANIZED HEALTH CARE EDUCATION/TRAINING PROGRAM

## 2024-04-25 PROCEDURE — 86850 RBC ANTIBODY SCREEN: CPT | Performed by: EMERGENCY MEDICINE

## 2024-04-25 RX ORDER — MAGNESIUM OXIDE 400 MG (241.3 MG MAGNESIUM) TABLET
325 TABLET
Qty: 90 TABLET | Refills: 0 | Status: SHIPPED | OUTPATIENT
Start: 2024-04-25 | End: 2024-05-25

## 2024-04-25 RX ORDER — MEDROXYPROGESTERONE ACETATE 10 MG/1
10 TABLET ORAL DAILY
Qty: 7 TABLET | Refills: 0 | Status: SHIPPED | OUTPATIENT
Start: 2024-04-25 | End: 2024-05-02

## 2024-04-25 RX ORDER — KETOROLAC TROMETHAMINE 15 MG/ML
15 INJECTION, SOLUTION INTRAMUSCULAR; INTRAVENOUS ONCE
Status: COMPLETED | OUTPATIENT
Start: 2024-04-25 | End: 2024-04-25

## 2024-04-25 RX ORDER — MEDROXYPROGESTERONE ACETATE 10 MG/1
10 TABLET ORAL ONCE
Qty: 1 TABLET | Refills: 0 | Status: COMPLETED | OUTPATIENT
Start: 2024-04-25 | End: 2024-04-25

## 2024-04-25 NOTE — ED PROVIDER NOTES
Patient Seen in: [facility] Emergency Department      History     Chief Complaint   Patient presents with    Vaginal Bleeding     Stated Complaint: Heavy vaginal bleeding- weakness, shortness of breath for approx one week.    Subjective:   HPI    38-year-old female presents to the emergency department with heavy vaginal bleeding.  Patient has been having issues with vaginal bleeding that is been ongoing for a number of months she has been followed by OB/GYN.  She was recently on Provera and this stopped her bleeding but it started bleeding again 2 weeks ago and has been heavy in the past week.  She states that she has episodes of cramping and then heavy bleeding.  She states when she is bleeding heavily that she sometimes has to change her pads every 20 minutes.  She is on iron supplements.  She did require an iron infusion back in December and in November but has not required a transfusion as of yet.  No fevers or chills.  No cough cold or congestion.  No other acute complaints.  Patient had her vaginal ultrasound this morning.    Objective:   No pertinent past medical history.            No pertinent past surgical history.              No pertinent social history.            Review of Systems   All other systems reviewed and are negative.      Positive for stated complaint: Heavy vaginal bleeding- weakness, shortness of breath for approx one week.  Other systems are as noted in HPI.  Constitutional and vital signs reviewed.      All other systems reviewed and negative except as noted above.    Physical Exam     ED Triage Vitals [04/25/24 1021]   /82   Pulse 92   Resp 16   Temp 98 °F (36.7 °C)   Temp src    SpO2 99 %   O2 Device None (Room air)       Current:BP (!) 147/98   Pulse 78   Temp 98 °F (36.7 °C)   Resp 16   LMP 11/01/2023 (Exact Date)   SpO2 99%         Physical Exam  Vitals and nursing note reviewed.   Constitutional:       Appearance: Normal appearance. She is well-developed. She is  obese.   HENT:      Head: Normocephalic and atraumatic.   Cardiovascular:      Rate and Rhythm: Normal rate and regular rhythm.      Pulses: Normal pulses.      Heart sounds: Normal heart sounds.   Pulmonary:      Effort: Pulmonary effort is normal.      Breath sounds: Normal breath sounds. No stridor. No wheezing.   Abdominal:      General: Bowel sounds are normal.      Palpations: Abdomen is soft.      Tenderness: There is abdominal tenderness. There is no rebound.   Musculoskeletal:         General: No tenderness. Normal range of motion.      Cervical back: Normal range of motion and neck supple.   Lymphadenopathy:      Cervical: No cervical adenopathy.   Skin:     General: Skin is warm and dry.      Coloration: Skin is pale.   Neurological:      General: No focal deficit present.      Mental Status: She is alert and oriented to person, place, and time.      Cranial Nerves: No cranial nerve deficit.      Coordination: Coordination normal.              ED Course     Labs Reviewed   CBC W/ DIFFERENTIAL - Abnormal; Notable for the following components:       Result Value    RBC 3.63 (*)     HGB 9.9 (*)     HCT 31.1 (*)     All other components within normal limits   COMP METABOLIC PANEL (14) - Normal   CBC WITH DIFFERENTIAL WITH PLATELET    Narrative:     The following orders were created for panel order CBC With Differential With Platelet.  Procedure                               Abnormality         Status                     ---------                               -----------         ------                     CBC W/ DIFFERENTIAL[[id number]]          Abnormal            Final result                 Please view results for these tests on the individual orders.   TYPE AND SCREEN    Narrative:     The following orders were created for panel order Type and screen.  Procedure                               Abnormality         Status                     ---------                               -----------         ------                      ABORH (Blood Type)[[id number]]                               Final result               Antibody Screen[[id number]]                                  Final result                 Please view results for these tests on the individual orders.   ABORH (BLOOD TYPE)   ANTIBODY SCREEN   RAINBOW DRAW LAVENDER   RAINBOW DRAW LIGHT GREEN             Transvaginal US [facility] Only [[id number]]    Result Date: 4/25/2024  Left Ovary Length 2.79 cm Width 2.60 cm Height 2.03 cm Volume 7.710 cm³ Uterus Length 10.21 cm Width 6.89 cm Height 6.15 cm Endo.Thickness 10.75 mm Anteverted uterus. Normal appearing left ovary. Unable to visualize right ovary. Increased color flow to endometrium. Marcelo Vaughn MD            MDM      Had IV established and blood work obtained.  Patient's hemoglobin has dropped but is still well above the level that would require transfusion.  I was only able to pull up part of an ultrasound.  Subsequently I did call the OB/GYN service to see if there is any other workup that they required.  Their primary concern was to be certain that she was not at a level that she required transfusion.  We reviewed the ultrasound report and she does not have any significant abnormalities per OB/GYN.  They suggested that she be restarted on Provera 10 mg for the next 7 days and they will see her within the 7 days.  I also discussed with her that currently she is only taking 1 iron pill a day that she increase it to 3 times daily with the active bleeding.  Should she become increasingly lightheaded or have progressive symptoms she is to return to have repeat hemoglobin checked.  Otherwise she is advised to follow-up with OB/GYN.  She was given a dose of Toradol to help with some of the abdominal cramping as well as Provera while in the emergency department.  She was discharged in good condition.  She was never hemodynamically unstable.                                   Medical Decision Making      Disposition and Plan      Clinical Impression:  1. Dysfunctional uterine bleeding         Disposition:  Discharge  4/25/2024 11:54 am    Follow-up:  Tuan Hughes DO  [address]  [address]  [place] [address]  [phone]    Schedule an appointment as soon as possible for a visit      Marcelo Vaughn MD  [address] [facility] [facility] [address]  [place] [address]  [phone]    Schedule an appointment as soon as possible for a visit            Medications Prescribed:  Discharge Medication List as of 4/25/2024 11:59 AM        START taking these medications    Details   !! ferrous sulfate 325 (65 FE) MG Oral Tab EC Take 1 tablet (325 mg total) by mouth 3 (three) times daily with meals., Normal, Disp-90 tablet, R-0       !! - Potential duplicate medications found. Please discuss with provider.

## 2024-04-25 NOTE — ED INITIAL ASSESSMENT (HPI)
Pt presents with heavy vaginal bleeding. Hx of heavy menses. Was at gyne and was told to come to ER. Changing pad every 30 min. +dizzy weak

## 2024-04-26 NOTE — PROGRESS NOTES
Spoke to patient; was seen in the ED yesterday. Bleeding is the same but has started the Provera again.   Precautions provided. Is scheduled to be seen next Wednesday with Dr. Bustos and EMB is scheduled for June.   No further questions.

## 2024-05-01 ENCOUNTER — OFFICE VISIT (OUTPATIENT)
Dept: OBGYN CLINIC | Facility: CLINIC | Age: 39
End: 2024-05-01
Payer: COMMERCIAL

## 2024-05-01 VITALS
SYSTOLIC BLOOD PRESSURE: 124 MMHG | BODY MASS INDEX: 37.83 KG/M2 | HEIGHT: 63 IN | DIASTOLIC BLOOD PRESSURE: 78 MMHG | HEART RATE: 100 BPM | WEIGHT: 213.5 LBS

## 2024-05-01 DIAGNOSIS — N92.1 MENOMETRORRHAGIA: ICD-10-CM

## 2024-05-01 DIAGNOSIS — R93.89 THICKENED ENDOMETRIUM: ICD-10-CM

## 2024-05-01 DIAGNOSIS — N93.9 ABNORMAL UTERINE BLEEDING (AUB): Primary | ICD-10-CM

## 2024-05-01 PROBLEM — I10 CHRONIC HYPERTENSION: Status: ACTIVE | Noted: 2024-05-01

## 2024-05-01 PROCEDURE — 3074F SYST BP LT 130 MM HG: CPT | Performed by: OBSTETRICS & GYNECOLOGY

## 2024-05-01 PROCEDURE — 3008F BODY MASS INDEX DOCD: CPT | Performed by: OBSTETRICS & GYNECOLOGY

## 2024-05-01 PROCEDURE — 99213 OFFICE O/P EST LOW 20 MIN: CPT | Performed by: OBSTETRICS & GYNECOLOGY

## 2024-05-01 PROCEDURE — 3078F DIAST BP <80 MM HG: CPT | Performed by: OBSTETRICS & GYNECOLOGY

## 2024-05-01 RX ORDER — MEDROXYPROGESTERONE ACETATE 10 MG/1
10 TABLET ORAL DAILY
Qty: 21 TABLET | Refills: 3 | Status: SHIPPED | OUTPATIENT
Start: 2024-05-01

## 2024-05-01 NOTE — PROGRESS NOTES
Subjective:  38 year old    Chief Complaint   Patient presents with    Er F/u     ER f/u from visit on 24  with heavy vaginal bleeding. Pt states that symptoms are still ongoing      Follow up to ED visit.  Currently taking provera 10 mg daily, but still having intermittent heavy bleeding and clots.  Last Hg 9.9 and has seen hematology.  Also treated with Provera last last year.  Delivered April last year and did fine PP and while nursing, but having bleeding problems since November.      Review of Systems:  Pertinent items are noted in the HPI.    Objective:  /78   Pulse 100   Ht 63\"   Wt 213 lb 8 oz (96.8 kg)   LMP 2024     Physical Examination:  General appearance: Well dressed, well nourished in no apparent distress  Neurologic/Psychiatric: Alert and oriented to person, place and time, mood normal, affect appropriate    Assessment/Plan:  5 month history of abnormal uterine bleeding/menometrorrhagia, 1 yr postpartum.  Patient with long standing history of irregular menses, likely related to increased BMI.  Patient is working on weight loss with Zepbound.  Discussed option of progesterone therapy and EMB as previously discussed with Dr. Vaughn, versus therapeutic/diagnostic hysteroscopy/D&C due to persistent heavy bleeding and anemia.      Patient desires to proceed with hysteroscopy, D&C, and possible endometrial polypectomy/myomectomy. Risks, benefits and potential complications reviewed, including anesthesia, infection with need for intravenous antibiotics, bleeding with need for blood transfusion, scarring, uterine perforation with need for hospitalization or additional surgery and fluid overload/electrolyte imbalances.  All questions answered and patient agrees to the  proposed surgery without reservation.    Long term recommend either monthly Provera, combination OCP or Mirena IUD.  Patient with hx of infertility and undecided right now regarding future pregnancy.    Recommend  continue progesterone therapy.  Will increase provera to 20 mg daily for 7 days, then 10 mg daily for 7 days, then provera 10 mg x 7 days every month.      Anemia- follow up Dr. Fagan    [facility] [place] pamphlets on above procedures given.     Diagnoses and all orders for this visit:    Abnormal uterine bleeding (AUB)    Thickened endometrium    Menometrorrhagia  -     medroxyPROGESTERone Acetate (PROVERA) 10 MG Oral Tab; Take 1 tablet (10 mg total) by mouth daily. For 7 days every month      Return for Post Operative Visit.

## 2024-05-03 ENCOUNTER — TELEPHONE (OUTPATIENT)
Dept: OBGYN CLINIC | Facility: CLINIC | Age: 39
End: 2024-05-03

## 2024-05-03 NOTE — TELEPHONE ENCOUNTER
----- Message from Waldo Bustos MD sent at 5/1/2024 12:23 PM CDT -----  Surgeon: Dr. Waldo Bustos  Need in next 2-3 weeks.  Can do post call or early am if necessary    Date:     Assistant: ang    Type of Admit/Expected Discharge Department: Outpatient/Same Day    LOS: 0    Procedure Location: Main OR    PreOp Dx: Abnormal uterine bleeding, menometrorrhagia, thickened endometrium    Procedure: Hysteroscopy, dilation and curettage with Truclear or Myosure, possible endometrial polypectomy/myomectomy     Anticipated Time:  45 min    Anesthesia: Choice    Special Equipment/Comments:     Antibiotics: Initiate antibiotics per [facility] adult preoperative prophylactic antibiotic protocol       Pre Op Orders: Initiate my Pre-op standing orders, if none exist please use [facility]    Labs: Per Willy    Medical clearance: No

## 2024-05-30 ENCOUNTER — OFFICE VISIT (OUTPATIENT)
Dept: INTERNAL MEDICINE CLINIC | Facility: CLINIC | Age: 39
End: 2024-05-30

## 2024-05-30 VITALS
SYSTOLIC BLOOD PRESSURE: 132 MMHG | RESPIRATION RATE: 16 BRPM | HEIGHT: 63.5 IN | BODY MASS INDEX: 36.05 KG/M2 | HEART RATE: 72 BPM | WEIGHT: 206 LBS | DIASTOLIC BLOOD PRESSURE: 80 MMHG

## 2024-05-30 DIAGNOSIS — Z51.81 ENCOUNTER FOR THERAPEUTIC DRUG MONITORING: Primary | ICD-10-CM

## 2024-05-30 DIAGNOSIS — E66.01 CLASS 3 SEVERE OBESITY WITH SERIOUS COMORBIDITY AND BODY MASS INDEX (BMI) OF 40.0 TO 44.9 IN ADULT, UNSPECIFIED OBESITY TYPE (HCC): ICD-10-CM

## 2024-05-30 DIAGNOSIS — K21.9 GASTROESOPHAGEAL REFLUX DISEASE, UNSPECIFIED WHETHER ESOPHAGITIS PRESENT: ICD-10-CM

## 2024-05-30 DIAGNOSIS — E78.1 HYPERTRIGLYCERIDEMIA: ICD-10-CM

## 2024-05-30 DIAGNOSIS — I10 HYPERTENSION, UNSPECIFIED TYPE: ICD-10-CM

## 2024-05-30 PROCEDURE — 3075F SYST BP GE 130 - 139MM HG: CPT | Performed by: NURSE PRACTITIONER

## 2024-05-30 PROCEDURE — 99214 OFFICE O/P EST MOD 30 MIN: CPT | Performed by: NURSE PRACTITIONER

## 2024-05-30 PROCEDURE — 3079F DIAST BP 80-89 MM HG: CPT | Performed by: NURSE PRACTITIONER

## 2024-05-30 PROCEDURE — 3008F BODY MASS INDEX DOCD: CPT | Performed by: NURSE PRACTITIONER

## 2024-05-30 RX ORDER — TIRZEPATIDE 10 MG/.5ML
10 INJECTION, SOLUTION SUBCUTANEOUS WEEKLY
Qty: 2 ML | Refills: 1 | Status: SHIPPED | OUTPATIENT
Start: 2024-05-30

## 2024-05-30 RX ORDER — TIRZEPATIDE 7.5 MG/.5ML
7.5 INJECTION, SOLUTION SUBCUTANEOUS WEEKLY
Qty: 2 ML | Refills: 0 | Status: SHIPPED | OUTPATIENT
Start: 2024-05-30

## 2024-05-30 RX ORDER — DROSPIRENONE 4 MG/1
1 TABLET, FILM COATED ORAL DAILY
COMMUNITY
Start: 2024-05-07

## 2024-05-30 NOTE — PATIENT INSTRUCTIONS
Continue making lifestyle changes that focus on good nutrition, regular exercise and stress management.    Medication Plan: Increase Zepbound to 7.5 mg weekly x4 weeks and then 10 mg weekly thereafter. If supply not available plan to switch to Wegovy.    Next steps to work on before next office visit include: Great work with portion control an improved nutritional balance. Continue with therapy and consider meditation and yoga to help with stress management. Baseline body composition (BC) completed today with total body fat 41.5% (goal <32%), Visceral Fat 11 (goal <10), and muscle mass 14.2% (goal >18%).      I also recommend modifying nutrition with a focus on Food 4 Fuel and eating clean, lean and green!     Eat whole foods (think farm to table sources) and minimize/eliminate processed and ultra processed foods.  Eat lean sources of protein, recommending incorporating plant based options (no fat/cholesterol in plants) and focus on lean animal protein sources such as eggs, chicken and fish. Minimize beef such as pork and red meat to 1 serving/week.  Increase the consumption of plant based foods with a goal of making 85% of your daily nutrition from plants. Plant based foods are less calorically dense and more nutritionally dense. They do not have fat, which can contribute to insulin resistance and elevated cholesterol. Fruits and vegetables provide an array of vitamins, minerals, phytonutrients (plant protectors) and antiinflammatory properties. All these components help to prevent disease and help your body to work properly!      Re-thinking Nutrition: Learning to See Food as Fuel  October 8, 2019  Posted in Blog, Nutrition  [employer] [facility]    “Dieting” can start to feel challenging when we begin to associate healthy behaviors with “punishment.” Too often, we overlook the real reason we eat food. It's not only meant to be enjoyed, but also to provide basic fuel for our bodies.  Learning to see food  as fuel can help you find balance in your journey with weight. It will teach you about the primal purpose of food, the effect certain foods have on health, and how to listen carefully to what your body is trying to tell you.  It Starts with Cells  Did you know your body has more than 35 trillion cells? Each one serves a purpose.  Once a cell has completed its purpose, it dies. Your body replaces dead and worn-out cells with new and energetic ones. It also depends on this ongoing cell cycle to keep you healthy. And guess what? The foods you eat help shape this process.  Seeing Food as Fuel  Eating the right foods helps build and repair cells to be stronger than before. It does this by getting nutrition from whole grains, vitamins, minerals, fats, protein and other nutrients.  These essential nutrients matter greatly to every single cell in your body. They make up your:  Cell membrane  Nucleus  Mitochondria  When cells join together, they make tissue that brings life to your bones, brain, skin, nerves, muscles, etc. The health and lifespan of your cells depend on the nutrients you get from food. That is why healthy food choices are so important.   Once you can start to see food as being fuel for your body, you will get better at making the healthy choice the easy choice. Food will be less about rewards or punishments and more about supporting your overall health and happiness.  Building Blocks of Good Nutrition  A diet without enough fiber, protein and healthy fats can cause your cells to become brittle, leaky and tired. When cells can't do what they are designed to do, problems like inflammation, cancer and other difficult health conditions start to arise.  Foods that Support Healthy Cells:  Unsaturated Fats - Fish, nuts avocados, olive oil, flax seed, etc.  Protein - Poultry, lean beef, yogurt, eggs, seeds, beans, etc.  Antioxidants - Fruits, dark green veggies, sweet potatoes, tea, etc.  So, the next time you grab  something to eat, ask yourself how that food helps or hurts your body. This is a part of living mindful and being knowledgeable about what you consume regularly.  When you start to see food as fuel, not just a tasty treat or the solution to a bad temper, you will start to make healthier choices more often. Making new habits is one of the building blocks to successful long-term weight management!  Clean Eating: What is it Really About?    March 18, 2022  Posted in Blog, Nutrition  [employer] [facility]    Many people focus on “clean eating.” In a world filled with potato chips, fast food burgers and cake, switching to clean eating can be a big change. Is it time for you to make it?    What is Clean Eating?  Eating clean has many variations and has gained popularity over the last several years. It focuses on choosing whole foods and minimally-processed foods and is more of a philosophy than a diet. The goal is to choose foods as close to their natural state as possible. Adding fruits, vegetables, meats, and whole grains is a great start. Processed foods such as chips, cookies and fats are eliminated.    Unlike other plans, clean eating isn’t specifically about portion sizes or numbers. Some find this way of eating to be easier. There is limited research on eating clean; however, a clean diet is plant-based and low in saturated fat.    Tips for Eating Clean    Limit packaged processed foods. Some of this is obvious. It’s time to trade in the chips, cookies and snack cakes for other foods such as fruits, vegetables and nuts. Additionally, you might need to change how you prepare food. Swap boxed mashed potatoes for whole baked potatoes. Instead of bagged salad, chop your own salad from fresh vegetables.    Read the labels. With the focus on minimally-processed foods, the fewer the ingredients the better. Avoid added sugar, sweeteners and preservatives.  Find other ways to spice up your food. Fresh herbs can go  further than any added preservative. Basil, cilantro or chives can spice up any meal.  Choose whole grains. White bread and refined grains should be limited. Instead, choose whole-grain bread, quinoa, brown rice and oats.    Hydrate with water. Water is your main source of fluid. For some variety and extra flavor, add a slice of lime or cucumber into your water. Sodas and sugary drinks should be eliminated. Herbal teas are a great option.  Dive into dairy. Milk, unsweetened yogurt and cheese can be great choices. Try to avoid sweetened milks or yogurts.  Pack the protein. Protein from beans, nuts and legumes are great. So are lean meats without fatty flavorings. Some clean eaters avoid meat from certain grocery stores or brands due to growth hormones and antibiotics. For those, choosing organic may be an option.    Take Things Slow  It can be a little overwhelming to begin a clean eating plan. Throwing away the contents of your cabinet may not be an option. Instead of taking away, focus on adding. Add more fruit and more vegetables to your day. You will find that by doing this, there is less room for processed foods. Small changes over time can add up. Get started today!      What are proteins?  Proteins are one of three primary macronutrients that provide energy to the human body, along with fats and carbohydrates. Proteins are also responsible for a large portion of the work that is done in cells; they are necessary for proper structure and function of tissues and organs, and also act to regulate them. They are comprised of a number of amino acids that are essential to proper body function, and serve as the building blocks of body tissue.  There are 20 different amino acids in total, and the sequence of amino acids determines a protein's structure and function. While some amino acids can be synthesized in the body, there are 9 amino acids that humans can only obtain from dietary sources (insufficient amounts of  which may sometimes result in death), termed essential amino acids. Foods that provide all of the essential amino acids are called complete protein sources, and include both animal (meat, dairy, eggs, fish) as well as plant-based sources (soy, quinoa, buckwheat).  Proteins can be categorized based on the function they provide to the body. Below is a list of some types of proteins:  Antibody--proteins that protect the body from foreign particles, such as viruses and bacteria, by binding to them  Enzyme--proteins that help form new molecules as well as perform the many chemical reactions that occur throughout the body  Messenger--proteins that transmit signals throughout the body to maintain body processes  Structural component--proteins that act as building blocks for cells that ultimately allow the body to move  Transport/storage--proteins that move molecules throughout the body  As can be seen, proteins have many important roles throughout the body, and as such, it is important to provide sufficient nutrition to the body to maintain healthy protein levels.    How much protein do I need?  The amount of protein that the human body requires daily is dependent on many conditions, including overall energy intake, growth of the individual, and physical activity level. It is often estimated based on body weight, as a percentage of total caloric intake (10-35%), or based on age alone. 0.8g/kg of body weight is a commonly cited recommended dietary allowance (RDA). This value is the minimum recommended value to maintain basic nutritional requirements, but consuming more protein, up to a certain point, maybe beneficial, depending on the sources of the protein.  The recommended range of protein intake is between 0.8 g/kg and 1.8 g/kg of body weight, dependent on the many factors listed above. People who are highly active, or who wish to build more muscle should generally consume more protein. Some sources suggest consuming  between 1.8 to 2 g/kg for those who are highly active. The amount of protein a person should consume, to date, is not an exact science, and each individual should consult a specialist, be it a dietitian, doctor, or , to help determine their individual needs.    Foods high in protein  There are many different combinations of food that a person can eat to meet their protein intake requirements. For many people, a large portion of protein intake comes from meat and dairy, though it is possible to get enough protein while meeting certain dietary restrictions you might have. Generally, it is easier to meet your RDA of protein by consuming meat and dairy, but an excess of either can have a negative health impact. There are plenty of plant-based protein options, but they generally contain less protein in a given serving. Ideally, a person should consume a mixture of meat, dairy, and plant-based foods in order to meet their RDA and have a balanced diet replete with nutrients.  If possible, consuming a variety of complete proteins is recommended. A complete protein is a protein that contains a good amount of each of the nine essential amino acids required in the human diet. Examples of complete protein foods or meals include:    Meat/Dairy examples  Eggs  Chicken breast  Cottage cheese  Greek yogurt  Milk  Lean beef  Tuna  Turkey breast  Fish  Shrimp    Vegan/plant-based examples  Buckwheat  Hummus and tiffany  Soy products (tofu, tempeh, edamame beans)  Peanut butter on toast or some other bread  Beans and rice  Quinoa  Hemp and suzanne seeds  Spirulina  Generally, meat, poultry, fish, eggs, and dairy products are complete protein sources. Nuts and seeds, legumes, grains, and vegetables, among other things, are usually incomplete proteins. There is nothing wrong with incomplete proteins however, and there are many healthy, high protein foods that are incomplete proteins. As long as you consume a sufficient variety  of incomplete proteins to get all the required amino acids, it is not necessary to specifically eat complete protein foods. In fact, certain high fat red meats for example, a common source of complete proteins, can be unhealthy.   Below are some examples of high protein foods that are not complete proteins:  Almonds  Oats  Broccoli  Lentils  Red bread  John seeds  Pumpkin seeds  Peanuts  [place] sprouts  Grapefruit  Green peas  Avocados  Mushrooms  As can be seen, there are many different foods a person can consume to meet their RDA of protein. The examples provided above do not constitute an exhaustive list of high protein or complete protein foods. As with everything else, balance is important, and the examples provided above are an attempt at providing a list of healthier protein options (when consumed in moderation).    Amount of protein in common food      Protein Amount  Milk (1 cup/8 oz)  8 g  Egg (1 large/50 g)  6 g  Meat (1 slice / 2 oz)  14 g  Seafood (2 oz)  16 g  Bread (1 slice/64 g)   8 g  Corn (1 cup/166 g)  16 g  Rice (1 cup/195 g)  5 g  Dry Bean (1 cup/92 g)   16 g  Nuts (1 cup/92 g)    20 g  Fruits and Veggie (1 cup)  1 g    Data above taken from www.calculator.net    The Power of Protein:    High Protein Foods:  FISH  (3-6 ounces/meal)  All types of fish  Seafood (shrimp, scallops, clams, mussels, lobster)  EGGS     2-3 eggs/meal  DAIRY (2/3 to 1 ½ cup)  Cottage cheese   Greek yogurt  PLANTS (½-3/4 cup/meal)  Legumes: Dried beans and peas (black beans, alfredo beans, garbanzo beans, kidney, cannellini, navy, split peas, black eyed peas)  Lentils  Quinoa  Soy (edamame, tofu)  PORK   (3-6 oz/meal)  Tenderloin  Pork chop  Top loin roast, boneless  Sirloin roast, boneless  Seward whitfield (nitrate free)  Boiled deli ham (nitrate free)    Additional Protein Sources:    BEEF    (3-6 oz/meal)  Flank steak       Skirt steak  Bottom round(rump roast), select   Ground beef, 90% lean (ground sirloin)  Harley  eye steak, choice  Eye of round roast, choice   POULTRY  (3-6 oz/meal)  Ground chicken or turkey  Chicken, no skin  Turkey, no skin  PROTEIN SHAKES: ELVIRA and Leonid (plant based and dairy free), Corepower by Avinash Gatica (plant based option available), Premier Protein, JuicePlus Complete (www.juiceplus.com)  PROTEIN BARS: RXBAR, PowerCrunch, Quest, Barebells      Nutrition and MyPlate: Vegetables  Vegetables are a major source of fiber. They’re also packed with vitamins needed for health and growth. At mealtimes, make half your plate fruits and vegetables.  Nutrient-rich choices  Fresh, frozen, or canned--all vegetables are high in nutrients. The color of the skin tells you what’s inside. So if you eat plenty of colors, you get a variety of nutrients. Some good choices include:  Dark green vegetables, such as spinach, sima greens, kale, and broccoli.  Bright red and orange vegetables, such as carrots, sweet potatoes, red bell peppers, and tomatoes.  Starchy vegetables, such as potatoes and squash.  What makes vegetables less healthy?  Boiling vegetables causes some vitamins to escape into the water. To hold on to vitamins, briefly steam, sauté, stir-olivia, or microwave instead. Overcooking destroys vitamins, so try to keep vegetables a little crispy.  Using a lot of margarine, butter, or salad dressing adds fat and calories, but not many nutrients. A small amount of these toppings is OK. But the more you add, the more fat you add, too.  Frozen vegetables that come with cheese sauce or other processed flavoring are high in fat and salt. It's healthier to season plain frozen vegetables yourself. Try fresh herbs, garlic, toasted almonds, or sesame seeds.  Canned vegetables often have lots of salt. Shop for low-sodium varieties.  One small change  Sneak vegetables into every meal. Shred carrots into hamburger, or add zucchini to spaghetti and meatballs. You won't even notice! Have a better idea? Write it  here:  ________________________________________________________  Date Last Reviewed: 10/1/2017  © 5653-2070 [employer]. [address] [place] [address], [place] [address]. All rights reserved. This information is not intended as a substitute for professional medical care. Always follow your healthcare professional's instructions.    Build Muscle & Lose Fat  The great fat vs muscle diagram below paints a clear picture of why it’s so important for you to build muscle in order to lose fat.  Maybe you’ve wondered about muscle vs fat and why you need to build muscle to lose fat, look slim and keep the inches off.  Well, look no further! With the fat vs muscle diagram below you’ll see why healthy permanent weight loss requires you to build muscle to lose fat.  Fat vs Muscle Diagram  The facts are clear. The best way to lose fat and look slimmer is to build muscle. Since one picture’s worth a thousand words, here’s 5 lbs of muscle vs fat of the same weight. Notice 5 lbs of fat is three times bigger.    This means that if you were to build 5 lbs of muscle and lose 5 lbs of fat, you would weigh exactly the same, but look smaller and firmer.  So imagine if it were 25 lbs or 50 lbs of lost fat vs muscle gained.  This is why it’s possible for you to lose fat inches when exercising, yet show no change in scale weight. And can you see how firm the muscle looks compared to the lumpy, tapioca pudding consistency of fat?  Build Muscle to Lose Fat Benefits  Although daily physical activity, like walking, swimming and aerobics are essential to good heart health and weight management, combining muscle building weight training with cardiovascular exercise, gives you an unbeatable combination to lose fat and keep it off permanently.  Of course it takes a few weeks before you see any measurable changes. But you’ll start to build muscle, lose fat and burn more calories from the moment you begin weight training. Building muscle helps  you:  1. Burn more calories. Unlike fat, muscle beefs up your metabolism to help you burn about 70% more calories than fat can.  2. Improve appearance. When done properly, strength training can greatly improve your posture and help to prevent joint pain.  3. Build confidence. Strong muscles and joints increase your level of confidence in your abilities to perform many lifestyle activities.  4. Prevent injury. Strength training can build stronger muscles and more limber, flexible joints, which play a crucial role in preventing injury.  5. Increase bone density. Weight bearing activities improve your bone density and reduce bone loss. This helps to prevent osteoporosis.  Studies show that weight training combined with aerobics and stretching is the best way to build a strong, firm body and keep it that way.  So, if you want to look and feel better than ever, you need to build muscle to lose fat.     Taken from www.[employer].Overwatch by Phani Gonzalez

## 2024-05-30 NOTE — PROGRESS NOTES
Karlie Dash is a 39 year old female presents today for follow-up on medical weight loss program for the treatment of overweight, obesity, or morbid obesity with associated prediabetes, hyperlipidemia, HTN.    S:  Current weight   Wt Readings from Last 6 Encounters:   05/30/24 206 lb (93.4 kg)   05/01/24 213 lb 8 oz (96.8 kg)   01/10/24 234 lb (106.1 kg)   12/29/23 229 lb 3.2 oz (104 kg)   12/18/23 228 lb 6.4 oz (103.6 kg)   12/05/23 220 lb (99.8 kg)    AND BMI Body mass index is 35.92 kg/m²..    Patient has lost 28# since LOV 4 month ago via NP VV.  She has been compliant with therapy. She has been tolerating Zepbound well without reported SEs. Has noticed a significant reduction in portions and cravings. Less impulsive eating and has developed a better routine of meals so she is not starving by end of day and eating in excess. Started seeing a counselor for stress, particularly related to work. Labs completed and reviewed with initially noted prediabetes, but repeat testing WNL.    Testing/consult completed since LOV: Dietician: no.  Labs:  yes, reviewed in EMR.    Labs: prediabetes, hyperlipidemia, sub optimal Vitamin D    [facility] Follow Up    Question 5/29/2024 11:37 PM CDT - Filed by Patient   Please describe a success moment: Being able to stop eating when full   Please describe a challenging moment/needs for improvement: Cravings increasing again   Please complete this 24 hour food journal, listing everything you had to eat in the past day. Include the average time of day you ate these meals at    List foods, qty and prep for breakfast:    List foods, qty and prep for lunch.    List foods, qty and prep for dinner.    List foods, qty and prep for snacks.    List the types and qty of fluids consumed    On average, how many meals did you eat out per week?    Exercise    How many days per week are you active or exercise 2   On average, how many days were anaerobic (strength/resistance) exercises  performed? 0   On average, how many days were aerobic (cardio) exercises performed? 0   Perceived level of exertion on a scale of 1-5, with 5 being very intense: 1   Stress    Average stress level on a scale of 1-10, with 10 being extremely stressed: 10   If greater than 5/1O how would you grade your coping mechanisms? fair   Sleep hours and integrity    How many hours of uninterrupted sleep do you get a night: 5   Do you feel rested in the morning: No   If no, what may have been disrupting your sleep? I wake up often to go to the bathroom   Please list any goal(s) for your next visit To better control cravings     Social hx and PMH reviewed. Employed from home.  with child.    REVIEW OF SYSTEMS:  GENERAL: feels well otherwise  LUNGS: denies shortness of breath with exertion  CARDIOVASCULAR: denies chest pain on exertion, denies palpitations or pedal edema  GI: denies abdominal pain.  No N/V/D/C, no heartburn reported- controlled  MUSCULOSKELETAL: no acute joint or muscle pain  NEURO: denies headaches, migraines stable  PSYCH: denies change in behavior or mood, denies feeling sad or depressed    EXAM:  /80   Pulse 72   Resp 16   Ht 5' 3.5\" (1.613 m)   Wt 206 lb (93.4 kg)   LMP 04/01/2024   BMI 35.92 kg/m²   GENERAL: well developed, well nourished, in no apparent distress, obese  EYES: conjunctiva pink, sclera non icteric, PERRLA  NECK: supple, no adenopathy or thyromegaly, +acanthosis nigricans  LUNGS: CTA in all fields, breathing non labored  CARDIO: RRR without murmur, normal S1 and S2 without clicks or gallops, no pedal edema.  GI: +BS, soft non tender, no masses or HSM  NEURO/MS: motor and sensory grossly intact  PSYCH: pleasant, cooperative, normal mood and affect    ASSESSMENT AND PLAN:  Reviewed Initial Weight Data and Goal Weight Loss:       Encounter Diagnoses   Name Primary?    Encounter for therapeutic drug monitoring Yes    Class 3 severe obesity with serious comorbidity and body mass  index (BMI) of 40.0 to 44.9 in adult, unspecified obesity type (HCC)     Hypertriglyceridemia     Gastroesophageal reflux disease, unspecified whether esophagitis present     Hypertension, unspecified type        No orders of the defined types were placed in this encounter.      Meds & Refills for this Visit:  Requested Prescriptions     Signed Prescriptions Disp Refills    Tirzepatide-Weight Management (ZEPBOUND) 7.5 MG/0.5ML Subcutaneous Solution Auto-injector 2 mL 0     Sig: Inject 7.5 mg into the skin once a week.    Tirzepatide-Weight Management (ZEPBOUND) 10 MG/0.5ML Subcutaneous Solution Auto-injector 2 mL 1     Sig: Inject 10 mg into the skin once a week. Start after completing full 4 weeks on 7.5 mg weekly dose.       Imaging & Consults:  None      Plan:  Patient has lost 28# since LOV 4 month ago on Zepbound 5 mg weekly with a total weight loss of 28# since initial consult on 2/6/2024 with initial weight of 234#.  Labs reviewed. Weight loss goal: lose 15# in 6 months and 50# in 1 year.  Increase Zepbound as directed. If not available plan Wegovy. Discussed possibly adding fluoxetine for stress if needed in the future.  on fitness and nutrition. Baseline BC obtained and reviewed. See patient instructions below for additional plans and patient counseling.      Patient Instructions   Continue making lifestyle changes that focus on good nutrition, regular exercise and stress management.    Medication Plan: Increase Zepbound to 7.5 mg weekly x4 weeks and then 10 mg weekly thereafter. If supply not available plan to switch to Wegovy.    Next steps to work on before next office visit include: Great work with portion control an improved nutritional balance. Continue with therapy and consider meditation and yoga to help with stress management. Baseline body composition (BC) completed today with total body fat 41.5% (goal <32%), Visceral Fat 11 (goal <10), and muscle mass 14.2% (goal >18%).      I also  recommend modifying nutrition with a focus on Food 4 Fuel and eating clean, lean and green!     Eat whole foods (think farm to table sources) and minimize/eliminate processed and ultra processed foods.  Eat lean sources of protein, recommending incorporating plant based options (no fat/cholesterol in plants) and focus on lean animal protein sources such as eggs, chicken and fish. Minimize beef such as pork and red meat to 1 serving/week.  Increase the consumption of plant based foods with a goal of making 85% of your daily nutrition from plants. Plant based foods are less calorically dense and more nutritionally dense. They do not have fat, which can contribute to insulin resistance and elevated cholesterol. Fruits and vegetables provide an array of vitamins, minerals, phytonutrients (plant protectors) and antiinflammatory properties. All these components help to prevent disease and help your body to work properly!      Re-thinking Nutrition: Learning to See Food as Fuel  October 8, 2019  Posted in Blog, Nutrition  By Your [employer]    “Dieting” can start to feel challenging when we begin to associate healthy behaviors with “punishment.” Too often, we overlook the real reason we eat food. It's not only meant to be enjoyed, but also to provide basic fuel for our bodies.  Learning to see food as fuel can help you find balance in your journey with weight. It will teach you about the primal purpose of food, the effect certain foods have on health, and how to listen carefully to what your body is trying to tell you.  It Starts with Cells  Did you know your body has more than 35 trillion cells? Each one serves a purpose.  Once a cell has completed its purpose, it dies. Your body replaces dead and worn-out cells with new and energetic ones. It also depends on this ongoing cell cycle to keep you healthy. And guess what? The foods you eat help shape this process.  Seeing Food as Fuel  Eating the right foods  helps build and repair cells to be stronger than before. It does this by getting nutrition from whole grains, vitamins, minerals, fats, protein and other nutrients.  These essential nutrients matter greatly to every single cell in your body. They make up your:  Cell membrane  Nucleus  Mitochondria  When cells join together, they make tissue that brings life to your bones, brain, skin, nerves, muscles, etc. The health and lifespan of your cells depend on the nutrients you get from food. That is why healthy food choices are so important.   Once you can start to see food as being fuel for your body, you will get better at making the healthy choice the easy choice. Food will be less about rewards or punishments and more about supporting your overall health and happiness.  Building Blocks of Good Nutrition  A diet without enough fiber, protein and healthy fats can cause your cells to become brittle, leaky and tired. When cells can't do what they are designed to do, problems like inflammation, cancer and other difficult health conditions start to arise.  Foods that Support Healthy Cells:  Unsaturated Fats - Fish, nuts avocados, olive oil, flax seed, etc.  Protein - Poultry, lean beef, yogurt, eggs, seeds, beans, etc.  Antioxidants - Fruits, dark green veggies, sweet potatoes, tea, etc.  So, the next time you grab something to eat, ask yourself how that food helps or hurts your body. This is a part of living mindful and being knowledgeable about what you consume regularly.  When you start to see food as fuel, not just a tasty treat or the solution to a bad temper, you will start to make healthier choices more often. Making new habits is one of the building blocks to successful long-term weight management!  Clean Eating: What is it Really About?    March 18, 2022  Posted in Blog, Nutrition  By Your [employer] Campaign    Many people focus on “clean eating.” In a world filled with potato chips, fast food burgers and  cake, switching to clean eating can be a big change. Is it time for you to make it?    What is Clean Eating?  Eating clean has many variations and has gained popularity over the last several years. It focuses on choosing whole foods and minimally-processed foods and is more of a philosophy than a diet. The goal is to choose foods as close to their natural state as possible. Adding fruits, vegetables, meats, and whole grains is a great start. Processed foods such as chips, cookies and fats are eliminated.    Unlike other plans, clean eating isn’t specifically about portion sizes or numbers. Some find this way of eating to be easier. There is limited research on eating clean; however, a clean diet is plant-based and low in saturated fat.    Tips for Eating Clean    Limit packaged processed foods. Some of this is obvious. It’s time to trade in the chips, cookies and snack cakes for other foods such as fruits, vegetables and nuts. Additionally, you might need to change how you prepare food. Swap boxed mashed potatoes for whole baked potatoes. Instead of bagged salad, chop your own salad from fresh vegetables.    Read the labels. With the focus on minimally-processed foods, the fewer the ingredients the better. Avoid added sugar, sweeteners and preservatives.  Find other ways to spice up your food. Fresh herbs can go further than any added preservative. Basil, cilantro or chives can spice up any meal.  Choose whole grains. White bread and refined grains should be limited. Instead, choose whole-grain bread, quinoa, brown rice and oats.    Hydrate with water. Water is your main source of fluid. For some variety and extra flavor, add a slice of lime or cucumber into your water. Sodas and sugary drinks should be eliminated. Herbal teas are a great option.  Dive into dairy. Milk, unsweetened yogurt and cheese can be great choices. Try to avoid sweetened milks or yogurts.  Pack the protein. Protein from beans, nuts and legumes  are great. So are lean meats without fatty flavorings. Some clean eaters avoid meat from certain grocery stores or brands due to growth hormones and antibiotics. For those, choosing organic may be an option.    Take Things Slow  It can be a little overwhelming to begin a clean eating plan. Throwing away the contents of your cabinet may not be an option. Instead of taking away, focus on adding. Add more fruit and more vegetables to your day. You will find that by doing this, there is less room for processed foods. Small changes over time can add up. Get started today!      What are proteins?  Proteins are one of three primary macronutrients that provide energy to the human body, along with fats and carbohydrates. Proteins are also responsible for a large portion of the work that is done in cells; they are necessary for proper structure and function of tissues and organs, and also act to regulate them. They are comprised of a number of amino acids that are essential to proper body function, and serve as the building blocks of body tissue.  There are 20 different amino acids in total, and the sequence of amino acids determines a protein's structure and function. While some amino acids can be synthesized in the body, there are 9 amino acids that humans can only obtain from dietary sources (insufficient amounts of which may sometimes result in death), termed essential amino acids. Foods that provide all of the essential amino acids are called complete protein sources, and include both animal (meat, dairy, eggs, fish) as well as plant-based sources (soy, quinoa, buckwheat).  Proteins can be categorized based on the function they provide to the body. Below is a list of some types of proteins:  Antibody--proteins that protect the body from foreign particles, such as viruses and bacteria, by binding to them  Enzyme--proteins that help form new molecules as well as perform the many chemical reactions that occur throughout the  body  Messenger--proteins that transmit signals throughout the body to maintain body processes  Structural component--proteins that act as building blocks for cells that ultimately allow the body to move  Transport/storage--proteins that move molecules throughout the body  As can be seen, proteins have many important roles throughout the body, and as such, it is important to provide sufficient nutrition to the body to maintain healthy protein levels.    How much protein do I need?  The amount of protein that the human body requires daily is dependent on many conditions, including overall energy intake, growth of the individual, and physical activity level. It is often estimated based on body weight, as a percentage of total caloric intake (10-35%), or based on age alone. 0.8g/kg of body weight is a commonly cited recommended dietary allowance (RDA). This value is the minimum recommended value to maintain basic nutritional requirements, but consuming more protein, up to a certain point, maybe beneficial, depending on the sources of the protein.  The recommended range of protein intake is between 0.8 g/kg and 1.8 g/kg of body weight, dependent on the many factors listed above. People who are highly active, or who wish to build more muscle should generally consume more protein. Some sources suggest consuming between 1.8 to 2 g/kg for those who are highly active. The amount of protein a person should consume, to date, is not an exact science, and each individual should consult a specialist, be it a dietitian, doctor, or , to help determine their individual needs.    Foods high in protein  There are many different combinations of food that a person can eat to meet their protein intake requirements. For many people, a large portion of protein intake comes from meat and dairy, though it is possible to get enough protein while meeting certain dietary restrictions you might have. Generally, it is easier to  meet your RDA of protein by consuming meat and dairy, but an excess of either can have a negative health impact. There are plenty of plant-based protein options, but they generally contain less protein in a given serving. Ideally, a person should consume a mixture of meat, dairy, and plant-based foods in order to meet their RDA and have a balanced diet replete with nutrients.  If possible, consuming a variety of complete proteins is recommended. A complete protein is a protein that contains a good amount of each of the nine essential amino acids required in the human diet. Examples of complete protein foods or meals include:    Meat/Dairy examples  Eggs  Chicken breast  Cottage cheese  Greek yogurt  Milk  Lean beef  Tuna  Turkey breast  Fish  Shrimp    Vegan/plant-based examples  Buckwheat  Hummus and tiffany  Soy products (tofu, tempeh, edamame beans)  Peanut butter on toast or some other bread  Beans and rice  Quinoa  Hemp and john seeds  Spirulina  Generally, meat, poultry, fish, eggs, and dairy products are complete protein sources. Nuts and seeds, legumes, grains, and vegetables, among other things, are usually incomplete proteins. There is nothing wrong with incomplete proteins however, and there are many healthy, high protein foods that are incomplete proteins. As long as you consume a sufficient variety of incomplete proteins to get all the required amino acids, it is not necessary to specifically eat complete protein foods. In fact, certain high fat red meats for example, a common source of complete proteins, can be unhealthy.   Below are some examples of high protein foods that are not complete proteins:  Almonds  Oats  Broccoli  Lentils  Red bread  John seeds  Pumpkin seeds  Peanuts  [place] sprouts  Grapefruit  Green peas  Avocados  Mushrooms  As can be seen, there are many different foods a person can consume to meet their RDA of protein. The examples provided above do not constitute an exhaustive  list of high protein or complete protein foods. As with everything else, balance is important, and the examples provided above are an attempt at providing a list of healthier protein options (when consumed in moderation).    Amount of protein in common food      Protein Amount  Milk (1 cup/8 oz)  8 g  Egg (1 large/50 g)  6 g  Meat (1 slice / 2 oz)  14 g  Seafood (2 oz)  16 g  Bread (1 slice/64 g)   8 g  Corn (1 cup/166 g)  16 g  Rice (1 cup/195 g)  5 g  Dry Bean (1 cup/92 g)   16 g  Nuts (1 cup/92 g)    20 g  Fruits and Veggie (1 cup)  1 g    Data above taken from www.calculator.net    The Power of Protein:    High Protein Foods:  FISH  (3-6 ounces/meal)  All types of fish  Seafood (shrimp, scallops, clams, mussels, lobster)  EGGS     2-3 eggs/meal  DAIRY (2/3 to 1 ½ cup)  Cottage cheese   Greek yogurt  PLANTS (½-3/4 cup/meal)  Legumes: Dried beans and peas (black beans, alfredo beans, garbanzo beans, kidney, cannellini, navy, split peas, black eyed peas)  Lentils  Quinoa  Soy (edamame, tofu)  PORK   (3-6 oz/meal)  Tenderloin  Pork chop  Top loin roast, boneless  Sirloin roast, boneless  Tajik whitfield (nitrate free)  Boiled deli ham (nitrate free)    Additional Protein Sources:    BEEF    (3-6 oz/meal)  Flank steak       Skirt steak  Bottom round(rump roast), select   Ground beef, 90% lean (ground sirloin)  Harley eye steak, choice  Eye of round roast, choice   POULTRY  (3-6 oz/meal)  Ground chicken or turkey  Chicken, no skin  Turkey, no skin  PROTEIN SHAKES: ELVIRA and Leonid (plant based and dairy free), [employer] (plant based option available), Premier Protein, JuicePlus Complete (www.juiceplus.com)  PROTEIN BARS: RXBAR, PowerCrunch, Quest, Barebells      Nutrition and MyPlate: Vegetables  Vegetables are a major source of fiber. They’re also packed with vitamins needed for health and growth. At mealtimes, make half your plate fruits and vegetables.  Nutrient-rich choices  Fresh, frozen, or  canned--all vegetables are high in nutrients. The color of the skin tells you what’s inside. So if you eat plenty of colors, you get a variety of nutrients. Some good choices include:  Dark green vegetables, such as spinach, sima greens, kale, and broccoli.  Bright red and orange vegetables, such as carrots, sweet potatoes, red bell peppers, and tomatoes.  Starchy vegetables, such as potatoes and squash.  What makes vegetables less healthy?  Boiling vegetables causes some vitamins to escape into the water. To hold on to vitamins, briefly steam, sauté, stir-olivia, or microwave instead. Overcooking destroys vitamins, so try to keep vegetables a little crispy.  Using a lot of margarine, butter, or salad dressing adds fat and calories, but not many nutrients. A small amount of these toppings is OK. But the more you add, the more fat you add, too.  Frozen vegetables that come with cheese sauce or other processed flavoring are high in fat and salt. It's healthier to season plain frozen vegetables yourself. Try fresh herbs, garlic, toasted almonds, or sesame seeds.  Canned vegetables often have lots of salt. Shop for low-sodium varieties.  One small change  Sneak vegetables into every meal. Shred carrots into hamburger, or add zucchini to spaghetti and meatballs. You won't even notice! Have a better idea? Write it here:  ________________________________________________________  Date Last Reviewed: 10/1/2017  © 3252-2571 [employer]. [address] [place] [address], [place] [address]. All rights reserved. This information is not intended as a substitute for professional medical care. Always follow your healthcare professional's instructions.    Build Muscle & Lose Fat  The great fat vs muscle diagram below paints a clear picture of why it’s so important for you to build muscle in order to lose fat.  Maybe you’ve wondered about muscle vs fat and why you need to build muscle to lose fat, look slim and keep the inches  off.  Well, look no further! With the fat vs muscle diagram below you’ll see why healthy permanent weight loss requires you to build muscle to lose fat.  Fat vs Muscle Diagram  The facts are clear. The best way to lose fat and look slimmer is to build muscle. Since one picture’s worth a thousand words, here’s 5 lbs of muscle vs fat of the same weight. Notice 5 lbs of fat is three times bigger.    This means that if you were to build 5 lbs of muscle and lose 5 lbs of fat, you would weigh exactly the same, but look smaller and firmer.  So imagine if it were 25 lbs or 50 lbs of lost fat vs muscle gained.  This is why it’s possible for you to lose fat inches when exercising, yet show no change in scale weight. And can you see how firm the muscle looks compared to the lumpy, tapioca pudding consistency of fat?  Build Muscle to Lose Fat Benefits  Although daily physical activity, like walking, swimming and aerobics are essential to good heart health and weight management, combining muscle building weight training with cardiovascular exercise, gives you an unbeatable combination to lose fat and keep it off permanently.  Of course it takes a few weeks before you see any measurable changes. But you’ll start to build muscle, lose fat and burn more calories from the moment you begin weight training. Building muscle helps you:  1. Burn more calories. Unlike fat, muscle beefs up your metabolism to help you burn about 70% more calories than fat can.  2. Improve appearance. When done properly, strength training can greatly improve your posture and help to prevent joint pain.  3. Build confidence. Strong muscles and joints increase your level of confidence in your abilities to perform many lifestyle activities.  4. Prevent injury. Strength training can build stronger muscles and more limber, flexible joints, which play a crucial role in preventing injury.  5. Increase bone density. Weight bearing activities improve your bone  density and reduce bone loss. This helps to prevent osteoporosis.  Studies show that weight training combined with aerobics and stretching is the best way to build a strong, firm body and keep it that way.  So, if you want to look and feel better than ever, you need to build muscle to lose fat.     Taken from [email or web address] by Phani Gonzalez        Medication use and SEs reviewed with patient.    Return in about 3 months (around 8/30/2024) for weight management via clinic or VV.    Patient verbalizes understanding.    DOCUMENTATION OF TIME SPENT: Code selection for this visit was based on time spent : 30 minutes on date of service in preparing to see the patient, obtaining and/or reviewing separately obtained history, performing a medically appropriate examination, counseling and educating the patient/family/caregiver, ordering medications or testing, referring and communicating with other healthcare providers, documenting clinical information in the electronic medical record, independently interpreting results and communicating results to the patient/family/caregiver and care coordination with the patient's other providers.      Answers submitted by the patient for this visit:  Medical Weight Loss Follow Up (Submitted on 5/29/2024)  If greater than 5/1O how would you grade your coping mechanisms?: fair

## 2024-06-12 ENCOUNTER — NURSE TRIAGE (OUTPATIENT)
Dept: INTERNAL MEDICINE CLINIC | Facility: CLINIC | Age: 39
End: 2024-06-12

## 2024-06-12 NOTE — TELEPHONE ENCOUNTER
Action Requested: Summary for Provider     []  Critical Lab, Recommendations Needed  [] Need Additional Advice  []   FYI    []   Need Orders  [] Need Medications Sent to Pharmacy  []  Other     SUMMARY: Patient reports increased depression, and would like to discuss medications with Dr Hughes.  She has been seeing a therapist for a couple of months, which helps.  She has been calling in sick from work a lot more, and feels like she needs additional help.  She denies any SI.  Appointment scheduled for patient.     Reason for call: Depression  Onset: Months     Patient reports increased depression that started with health issues since November, heavy bleeding and consistent pain.  Work stress, as she works in HR and \"carries other people's problems\". She has a hard time focusing, lack of concentration.        Reason for Disposition   Symptoms interfere with work or school    Protocols used: Depression-A-[place]

## 2024-06-17 ENCOUNTER — PATIENT MESSAGE (OUTPATIENT)
Dept: INTERNAL MEDICINE CLINIC | Facility: CLINIC | Age: 39
End: 2024-06-17

## 2024-06-19 NOTE — TELEPHONE ENCOUNTER
From: Karlie Dash  To: Tuan Hughes  Sent: 6/17/2024 10:08 PM CDT  Subject: Medical Leave Paperwork    Hi Dr. Hughes, here is the paperwork I need completed for my medical leave at work. Please let me know if I need to send it anywhere else to get it completed, thank you so much.

## 2024-06-24 DIAGNOSIS — E78.1 HYPERTRIGLYCERIDEMIA: ICD-10-CM

## 2024-06-24 DIAGNOSIS — E66.01 CLASS 3 SEVERE OBESITY WITH SERIOUS COMORBIDITY AND BODY MASS INDEX (BMI) OF 40.0 TO 44.9 IN ADULT, UNSPECIFIED OBESITY TYPE (HCC): ICD-10-CM

## 2024-06-24 DIAGNOSIS — Z51.81 ENCOUNTER FOR THERAPEUTIC DRUG MONITORING: ICD-10-CM

## 2024-06-26 NOTE — TELEPHONE ENCOUNTER
Requesting   Requested Prescriptions     Pending Prescriptions Disp Refills    Tirzepatide-Weight Management (ZEPBOUND) 7.5 MG/0.5ML Subcutaneous Solution Auto-injector 2 mL 0     Sig: Inject 7.5 mg into the skin once a week.     LOV: 5/30/24  RTC: 3 months  Filled: 5/30/24 #2 with 0 refills    Future Appointments   Date Time Provider Department Center   7/12/2024  1:00 PM Tuan Hughes,  [facility]   9/24/2024 11:00 AM Mel Go APRN [facility]     Pt not able to fill 10 mg

## 2024-06-28 RX ORDER — TIRZEPATIDE 7.5 MG/.5ML
7.5 INJECTION, SOLUTION SUBCUTANEOUS WEEKLY
Qty: 2 ML | Refills: 2 | Status: SHIPPED | OUTPATIENT
Start: 2024-06-28

## 2024-07-02 RX ORDER — ELETRIPTAN HYDROBROMIDE 40 MG/1
TABLET, FILM COATED ORAL
Refills: 0 | OUTPATIENT
Start: 2024-07-02

## 2024-07-13 ENCOUNTER — PATIENT MESSAGE (OUTPATIENT)
Dept: INTERNAL MEDICINE CLINIC | Facility: CLINIC | Age: 39
End: 2024-07-13

## 2024-07-15 NOTE — TELEPHONE ENCOUNTER
From: Karlie Dash  To: Tuan Hughes  Sent: 7/13/2024 1:24 AM CDT  Subject: Medical Leave Paperwork    My employer has not received the medical leave forms. Can you tell me who or where I can call to get a copy to send them myself?

## 2024-07-19 ENCOUNTER — PATIENT MESSAGE (OUTPATIENT)
Dept: INTERNAL MEDICINE CLINIC | Facility: CLINIC | Age: 39
End: 2024-07-19

## 2024-07-19 ENCOUNTER — TELEPHONE (OUTPATIENT)
Dept: INTERNAL MEDICINE CLINIC | Facility: CLINIC | Age: 39
End: 2024-07-19

## 2024-07-19 NOTE — TELEPHONE ENCOUNTER
Patient scheduled via [place]    Message    Appointment For: Karlie Dash ([id number])   Visit Type: MYCHART FOLLOW UP (3209)      7/22/2024    1:40 PM  20 mins.  Tuan Hughes      [facility] [address]      Patient Comments:   Follow up on medical leave for anxiety

## 2024-09-03 ENCOUNTER — PATIENT MESSAGE (OUTPATIENT)
Dept: INTERNAL MEDICINE CLINIC | Facility: CLINIC | Age: 39
End: 2024-09-03

## 2024-09-03 DIAGNOSIS — E66.01 CLASS 3 SEVERE OBESITY WITH SERIOUS COMORBIDITY AND BODY MASS INDEX (BMI) OF 40.0 TO 44.9 IN ADULT, UNSPECIFIED OBESITY TYPE (HCC): Primary | ICD-10-CM

## 2024-09-03 NOTE — TELEPHONE ENCOUNTER
Requesting zepbound  LOV: 5/30/24  RTC: 3 months  Last Relevant Labs: na  Filled: 5/30/24 #2ml with 1 refills zepbound 10 mg (last filled on snapshot 8/5/24)    Future Appointments   Date Time Provider Department Center   9/19/2024  9:40 AM Tuan Hughes DO [facility]   9/24/2024 11:00 AM Mel Go APRN [facility]

## 2024-09-03 NOTE — TELEPHONE ENCOUNTER
From: Karlei Dash  To: Mel Go  Sent: 9/3/2024 12:31 AM CDT  Subject: Move Up Dosage    I have two 10mg pens left and I will run out before my appointment on 9/23. Can I move up to the 12.5? I have been doing the 10mg for two months and my weight loss has slowed down significantly. I am losing and gaining the same 5 lbs for a month now.

## 2024-09-07 RX ORDER — TIRZEPATIDE 12.5 MG/.5ML
12.5 INJECTION, SOLUTION SUBCUTANEOUS WEEKLY
Qty: 2 ML | Refills: 0 | Status: SHIPPED | OUTPATIENT
Start: 2024-09-07 | End: 2024-09-29

## 2024-09-11 ENCOUNTER — TELEPHONE (OUTPATIENT)
Dept: INTERNAL MEDICINE CLINIC | Facility: CLINIC | Age: 39
End: 2024-09-11

## 2024-09-11 DIAGNOSIS — Z13.228 SCREENING FOR METABOLIC DISORDER: ICD-10-CM

## 2024-09-11 DIAGNOSIS — Z13.220 SCREENING FOR LIPID DISORDERS: ICD-10-CM

## 2024-09-11 DIAGNOSIS — Z13.0 SCREENING FOR DISORDER OF BLOOD AND BLOOD-FORMING ORGANS: ICD-10-CM

## 2024-09-11 DIAGNOSIS — Z13.29 SCREENING FOR THYROID DISORDER: ICD-10-CM

## 2024-09-11 DIAGNOSIS — Z00.00 ROUTINE GENERAL MEDICAL EXAMINATION AT A HEALTH CARE FACILITY: Primary | ICD-10-CM

## 2024-09-11 NOTE — TELEPHONE ENCOUNTER
Patient called request labs prior to their annual physical.  Annual physical scheduled for 10/01/2024  Please order labs. Patient preferred lab is [facility].  Patient informed request was sent to clinical team.  Patient informed to fast for labs.  No callback required.

## 2024-09-20 ENCOUNTER — LAB ENCOUNTER (OUTPATIENT)
Dept: LAB | Age: 39
End: 2024-09-20
Attending: INTERNAL MEDICINE
Payer: COMMERCIAL

## 2024-09-20 DIAGNOSIS — Z13.29 SCREENING FOR THYROID DISORDER: ICD-10-CM

## 2024-09-20 DIAGNOSIS — Z00.00 ROUTINE GENERAL MEDICAL EXAMINATION AT A HEALTH CARE FACILITY: ICD-10-CM

## 2024-09-20 DIAGNOSIS — Z13.220 SCREENING FOR LIPID DISORDERS: ICD-10-CM

## 2024-09-20 DIAGNOSIS — Z13.228 SCREENING FOR METABOLIC DISORDER: ICD-10-CM

## 2024-09-20 DIAGNOSIS — Z13.0 SCREENING FOR DISORDER OF BLOOD AND BLOOD-FORMING ORGANS: ICD-10-CM

## 2024-09-20 LAB
ALBUMIN SERPL-MCNC: 4.8 G/DL (ref 3.2–4.8)
ALBUMIN/GLOB SERPL: 1.8 {RATIO} (ref 1–2)
ALP LIVER SERPL-CCNC: 65 U/L
ALT SERPL-CCNC: 16 U/L
ANION GAP SERPL CALC-SCNC: 7 MMOL/L (ref 0–18)
AST SERPL-CCNC: 20 U/L (ref ?–34)
BASOPHILS # BLD AUTO: 0.06 X10(3) UL (ref 0–0.2)
BASOPHILS NFR BLD AUTO: 1.1 %
BILIRUB SERPL-MCNC: 0.8 MG/DL (ref 0.3–1.2)
BUN BLD-MCNC: 16 MG/DL (ref 9–23)
CALCIUM BLD-MCNC: 10.1 MG/DL (ref 8.7–10.4)
CHLORIDE SERPL-SCNC: 105 MMOL/L (ref 98–112)
CHOLEST SERPL-MCNC: 159 MG/DL (ref ?–200)
CO2 SERPL-SCNC: 26 MMOL/L (ref 21–32)
CREAT BLD-MCNC: 0.98 MG/DL
EGFRCR SERPLBLD CKD-EPI 2021: 75 ML/MIN/1.73M2 (ref 60–?)
EOSINOPHIL # BLD AUTO: 0.08 X10(3) UL (ref 0–0.7)
EOSINOPHIL NFR BLD AUTO: 1.5 %
ERYTHROCYTE [DISTWIDTH] IN BLOOD BY AUTOMATED COUNT: 17.4 %
FASTING PATIENT LIPID ANSWER: YES
FASTING STATUS PATIENT QL REPORTED: YES
GLOBULIN PLAS-MCNC: 2.6 G/DL (ref 2–3.5)
GLUCOSE BLD-MCNC: 87 MG/DL (ref 70–99)
HCT VFR BLD AUTO: 39.8 %
HDLC SERPL-MCNC: 38 MG/DL (ref 40–59)
HGB BLD-MCNC: 13.3 G/DL
IMM GRANULOCYTES # BLD AUTO: 0.01 X10(3) UL (ref 0–1)
IMM GRANULOCYTES NFR BLD: 0.2 %
LDLC SERPL CALC-MCNC: 88 MG/DL (ref ?–100)
LYMPHOCYTES # BLD AUTO: 1.5 X10(3) UL (ref 1–4)
LYMPHOCYTES NFR BLD AUTO: 28.3 %
MCH RBC QN AUTO: 28.4 PG (ref 26–34)
MCHC RBC AUTO-ENTMCNC: 33.4 G/DL (ref 31–37)
MCV RBC AUTO: 84.9 FL
MONOCYTES # BLD AUTO: 0.65 X10(3) UL (ref 0.1–1)
MONOCYTES NFR BLD AUTO: 12.3 %
NEUTROPHILS # BLD AUTO: 3 X10 (3) UL (ref 1.5–7.7)
NEUTROPHILS # BLD AUTO: 3 X10(3) UL (ref 1.5–7.7)
NEUTROPHILS NFR BLD AUTO: 56.6 %
NONHDLC SERPL-MCNC: 121 MG/DL (ref ?–130)
OSMOLALITY SERPL CALC.SUM OF ELEC: 287 MOSM/KG (ref 275–295)
PLATELET # BLD AUTO: 268 10(3)UL (ref 150–450)
POTASSIUM SERPL-SCNC: 4.3 MMOL/L (ref 3.5–5.1)
PROT SERPL-MCNC: 7.4 G/DL (ref 5.7–8.2)
RBC # BLD AUTO: 4.69 X10(6)UL
SODIUM SERPL-SCNC: 138 MMOL/L (ref 136–145)
TRIGL SERPL-MCNC: 193 MG/DL (ref 30–149)
TSI SER-ACNC: 1.69 MIU/ML (ref 0.55–4.78)
VLDLC SERPL CALC-MCNC: 31 MG/DL (ref 0–30)
WBC # BLD AUTO: 5.3 X10(3) UL (ref 4–11)

## 2024-09-20 PROCEDURE — 80061 LIPID PANEL: CPT | Performed by: INTERNAL MEDICINE

## 2024-09-20 PROCEDURE — 80050 GENERAL HEALTH PANEL: CPT | Performed by: INTERNAL MEDICINE

## 2024-09-24 ENCOUNTER — TELEMEDICINE (OUTPATIENT)
Dept: INTERNAL MEDICINE CLINIC | Facility: CLINIC | Age: 39
End: 2024-09-24
Payer: COMMERCIAL

## 2024-09-24 DIAGNOSIS — E66.01 CLASS 3 SEVERE OBESITY WITH SERIOUS COMORBIDITY AND BODY MASS INDEX (BMI) OF 40.0 TO 44.9 IN ADULT, UNSPECIFIED OBESITY TYPE (HCC): ICD-10-CM

## 2024-09-24 DIAGNOSIS — R73.03 PREDIABETES: ICD-10-CM

## 2024-09-24 DIAGNOSIS — I10 HYPERTENSION, UNSPECIFIED TYPE: ICD-10-CM

## 2024-09-24 DIAGNOSIS — E78.1 HYPERTRIGLYCERIDEMIA: ICD-10-CM

## 2024-09-24 DIAGNOSIS — Z51.81 ENCOUNTER FOR THERAPEUTIC DRUG MONITORING: Primary | ICD-10-CM

## 2024-09-24 DIAGNOSIS — K21.9 GASTROESOPHAGEAL REFLUX DISEASE, UNSPECIFIED WHETHER ESOPHAGITIS PRESENT: ICD-10-CM

## 2024-09-24 PROCEDURE — 99214 OFFICE O/P EST MOD 30 MIN: CPT | Performed by: NURSE PRACTITIONER

## 2024-09-24 RX ORDER — PROPRANOLOL HCL 60 MG
CAPSULE, EXTENDED RELEASE 24HR ORAL
COMMUNITY
Start: 2024-08-04

## 2024-09-24 RX ORDER — TIRZEPATIDE 15 MG/.5ML
15 INJECTION, SOLUTION SUBCUTANEOUS WEEKLY
Qty: 2 ML | Refills: 3 | Status: SHIPPED | OUTPATIENT
Start: 2024-09-24

## 2024-09-24 NOTE — PATIENT INSTRUCTIONS
Continue making lifestyle changes that focus on good nutrition, regular exercise and stress management.    Medication Plan: Finish up Zepbound 12.5 mg weekly box and then increase to full maintenance dose Zepbound 15 mg weekly.    Next steps to work on before next office visit include: Keep up the dedication and discipline as you take on the next goal of building your fitness routine!      Motivation gets you going, but DISCIPLINE keeps you GROWING!     Discipline is the bridge between goals and accomplishments        Developing a balanced fitness routine takes time. Begin to build the mentality of fitness 4 function! Start gradually and safely to continue to lose and maintain weight loss, build strength and prevent injury. Fitness not only supports a healthy body, but also a healthy mind with reducing stress and lifting your mood. I recommend a routine of 3x/week of cardio with varying intensity, 3x/week of strength training and 1x/week of stretching/flexibility/balance- such as Yoga.  Three ingredients necessary for making a habit of exercise for a lifetime includes: convenience, budget friendly and most importantly FUN! Changing up your exercise routine seasonally can keep you motivated and expose you to new interests and challenges! Step outside your comfort zone and give it a try, you never know where the challenge will lead you and what changes you may see!    Learning to Apply the FITT Principle to Your Exercise Plan  One of the biggest challenges with exercise is knowing where to start and how to get better. To improve your fitness, you must self-monitor your workouts and make changes when necessary. One of the best tools you can use to help you is the FITT Principle.  FITT is an acronym that outlines the basic components of a successful exercise plan.  Frequency - How often you exercise  Intensity - How hard you exercise  Time - How long you exercise  Type - What kind of exercise you do  How Often Should  You Exercise?  It is a myth that you must work out for extended periods every day to lose weight and keep it off. What is considered an “effective” exercise varies between people. Factors that affect this include age, fitness level, mobility, health conditions, etc.  Before you begin an exercise plan and decide how often to exercise, consider these key factors.  What is your current fitness level? What are you able to do?  What is your schedule? How much time do you have to exercise?  What health and fitness goals do you want to achieve?  Build your plan off of the answers to these questions. If you are a busy mom and you want to lose weight to gain more energy, you might not have a lot of time. Maybe your only form of exercise is keeping up with your kids. In this case, you may want to start small. For example, you could plan workouts for weekend afternoons when your spouse can watch the kids.  How Hard Should You Exercise?  The best way to see how hard you are working is to monitor your heart rate. You can do this by wearing a fitness tracker, heart rate monitor or smart watch. You can also feel for your heartbeat and count it over a 15-second period.  Low-intensity - An activity level you can continue for a long time (walking)  Moderate-intensity - An activity level that will boost your heart rate and require effort to maintain (biking)  High-intensity - An activity level that feels like an all-out effort. Your heart rate is high and you can't speak complete sentences between breaths  How Long Should You Exercise?  The time you spend exercising will usually depend on what you are doing. Health experts recommend at least 30 minutes of cardio exercise each workout. However, if you are doing a strength-based exercise, you will likely pay more attention to your number of “sets” and “reps.” Regardless, many other factors are involved.  The amount of time you have  How long it takes you to feel fatigued  Weather, time  of day  Health conditions  What Should You Do for Exercise?  Should you hit up the elliptical at the gym? Should you go for a hike? The type of exercise you do depends on what you like and what results you want.  For example, if you want to improve your cardio-vascular fitness and you love the outdoors, try exercises like hiking, swimming and biking. If you want to improve your muscle strength and you enjoy the convenience of the gym, try using free weights or machine weights. You can also use your body weight for exercises like push-ups, chin-ups, planks, etc.  The FITT Principle: Final Considerations  You can use the FITT Principle for cardio exercise, strength-based exercise, stretching and more. However, before you start any exercise plan, first consult with your healthcare provider. They will help you develop a plan that is safe and effective. By using the FITT Principle, you can not only improve your fitness level with time, but you can also prevent serious injury.       the Weights and Get Off the Treadmill for Faster Results    by Chris Tripp, PhD, CISSN, ACE-CPT at [facility] Resources https://www.obesityaction.org/resources/pick-up-the-weights/    Working out and improving your fitness can seem like a daunting task when you don’t know where to begin, which type of exercise is best, or how long you should work out. I want to help clear up any confusion and tell you what I tell my own clients - strength training is the most important thing you can do. Even if you only have 30 minutes.    The benefits of strength training are tremendous and superior in the long run (pun intended) to cardiorespiratory exercise. Cardio activities like running, cycling, rowing and others are extremely important, but if you are looking to prioritize what you do for physical activity, strength training is crucial.    What is Strength Training?    Strength training is a physical exercise that uses resistance to build muscular  strength and endurance. You can use free weights, exercise machines, resistance bands or your own body weight!    Strength training can help you build muscle mass, increase bone density and improve your metabolism. As a bonus, beginners will have greater gains early on than someone who’s been strength training for many years.    What strength training does to improve your health:    Burn more fat: Muscle is more metabolically active than fat, so the more you have, the more calories you burn all day - even at the same activity level. Don’t believe the myth that a pound of muscle burns 50 calories more a day than fat. The math isn’t accurate; however, muscle does burn more energy and requires more calories during exercise, so moving them more in the gym and throughout your day will burn more calories.    Prevent injury: Strong muscles mean strong, supported bones and connective tissue, which will all help your body withstand higher levels of physical stress without injury. This is a positive side effect at any age, but especially as we get older and are more prone to falling.    Improve overall health: Studies show resistance training can enhance heart and bone health, reduce blood pressure, lower cholesterol, increase bone density, reduce low back pain, improve sleep, and ease symptoms of arthritis and fibromyalgia. Getting older isn’t easy, but the longer you can stay healthy and active, the better.    Improve mood: Strength training releases feel-good endorphins, which reduce anxiety and can even help fight depression. Beyond supporting mental health through endorphins, strength training will help improve your self-esteem and body image.    Now that you know the numerous benefits of strength training, the next step is getting started. Always start with the basics and remember that it’s better to establish a healthy habit than it is to worry about being perfect. The perfect workout is the one that is right for you  at this moment in your life.    Here are some simple steps to get you started:    Start with bodyweight exercises like squats, lunges, push-ups and planks.  Gradually add weight to your routine as you get stronger.  Focus on compound exercises that work multiple muscle groups at once. Compound exercises involve more than one joint. For example, a squat involves the hip, knee and ankle.  Aim for 2-3 strength training sessions per week.  If you are relatively new to the gym life, you may feel a bit intimidated or even uncomfortable with strength training. Don’t let ‘gym-timidation’ take away from your fitness goals. There are many ways to overcome that feeling and crush those workouts.    Start small: Begin with short workouts at home or outside.    Find a workout julia: Partner up to make workouts more enjoyable and less intimidating.    Try group fitness classes: Find your favorite class led by an instructor who can motivate and guide you through your workout. Group classes are great for beginners because you don’t feel like you are struggling alone. You may even find a workout partner for future sessions.    Hire a : Invest in yourself and hire a  for customized training sessions to help meet your needs and goals. You can even hire me to help you get comfortable with those home strength-training workouts.    If going to the gym seems like an impossibility at this point, don’t worry -- you’re not alone. About one-third of respondents in a recent study said they’re too self-conscious to join a gym. If this sounds like you, I’d recommend you visit and ‘interview’ several gyms or fitness studios before deciding that going to a gym isn’t for you. Visit at a time you would normally go to see how crowded it is, what they offer, what amenities they have, and whether they specialize in specific activities such as strength training or boxing.      Holiday Weight and How to Avoid It    Obesity  Action Coalition by Rebeka Lucero, PhD  https://www.obesityaction.org/resources/holiday-weight-and-qgi-am-mscev-it/      When we think about the holidays many things come to mind - gifts, shopping, parties, family, decorating, long to-do lists --and delicious holiday treats.    Strategies for Avoiding Holiday Weight Gain  The holiday season is a busy time, and there are eating opportunities everywhere we go, such as family gatherings, office parties with trays of home-baked treats in the lunchroom, holiday and [id number] programs at our kids’ schools, treat samples being given away as we make our way through the stores to do our holiday shopping and catalogs in our mailboxes with mouth-watering photo spreads on every page. We’re really busy, perhaps too busy to prepare the healthy meals we might otherwise prepare.    Here are a few tips that will help you negotiate this joyful time with minimum risk to your weight management goals:    Focus on maintaining your current weight. Challenging yourself to lose weight over the holidays is setting yourself up for failure.  Don’t gorge on any special holiday food because you only get to eat it once a year. With luck, you’ll still be around to enjoy it next year. On the other hand, don’t deprive yourself of anything you want to taste. Instead, take a mindful bite, savoring the sight, taste, aroma, mouth feel and sound of each special holiday treat. Eating like this leads to increased pleasure, quicker satisfaction and decreased risk for weight gain.  Avoid the trap of thinking you can eat what you want because you can just start over in the New Year. It doesn’t get any easier just because it’s January - there are always other reasons to indulge and to celebrate.  Keep up your exercise routine. This will also help reduce holiday stress.  Keep tabs on yourself. Write down what you eat, weigh yourself if you want to or try on your favorite clothes to make sure they still  fit.  Create meaning beyond the food by creating new traditions that have nothing to do with food. For example, change “in our family we always have chocolate cinnamon bread with whipped cream on Christmas morning” into “in our family we always play in the snow (or on the beach), or go for a long walk/take food and gifts to the homeless shelter on Christmas morning.”  Sometimes, eating a particular food is our way of remembering a lost loved one. If that applies to you, find another way to remember them, like sharing memories with family members.  Remember all the reasons why reaching and maintaining a healthy weight is important to you.  Remember, unless you’re an elite athlete, you’re unlikely to be able to “exercise off” weeks of overindulgence.  Strategies for Holiday Parties  Most of us love holiday parties and look forward to them all year. We get to dress up and go to nice places, spend time with our nearest and dearest, enjoy our favorite holiday music and engage in the traditions that are meaningful to us. Despite all of the excitement, parties can also be minefields when it comes to honoring our healthy lifestyle goals. When we love parties, we may over-indulge as a way of intensifying the positive emotions we’re already feeling, and when we dislike parties, we may over-indulge in an effort to distract ourselves from the emotional discomfort that we’re feeling.    Here are a few tips that will help you get through holiday parties without sabotaging your goals:    Avoid wearing baggy clothing that allows you to expand as you eat.  After you’ve eaten, stay away from the food tables at the party.  Keep your hands busy by finding a way to help out. It’s the best way to distract yourself from the food.  Avoid alcohol. When we drink, we’re more likely to abandon healthy eating.  Fill up with water and other low-calorie drinks.  Take a healthy dish for the pot luck - something you can eat: consider salad, fruit,  raw vegetables and a healthy dip.  Focus on your relationships, not on the food - learn to focus on enjoying the people and the special holiday experiences, on building special memories for yourself and your family.  Meeting new people is another good way of distracting yourself from the food. If you’re shy, simply be a good listener.  Plan ahead. The best kind of plan, when it comes to food, is about what you are going to eat - not about what you’re not going to eat. If we focus on what we can’t eat (or what we think we shouldn’t eat), this kind of thinking can set us up for failure because it simply leaves us feeling deprived.  Don’t arrive completely famished - you’ll be more likely to eat in a way you’ll later regret. Plan to eat on the light side both before and after the event. Think about your meal plan for the day, and leave yourself some room to eat at the party.  Coping with Holiday Stress  As you know, the holiday season can be joyful and stressful at the same time. There’s so much to do, being around family can sometimes be difficult and often, we set ourselves the goal of creating the “perfect” holiday. Being stressed puts us at risk for stress-based eating in an effort to cope.    Here are some strategies you can use to reduce your stress levels:    Focus on what you’re grateful for.  Practice deep breathing whenever you feel overwhelmed.  Keep up your exercise routine.  Remind yourself to do just one thing at a time.  Remember -- you cannot do more than your best.  Be willing to say “no” to some events, tasks or requests. Sometimes this is the best way we can take care of ourselves.  Create a holiday season schedule for yourself. Schedule and prioritize everything you need to get done.  Reduce your expectations - aim for “good enough,” not “perfect.”  If you’re alone during the holidays, pamper yourself and find a way to help others who are less fortunate. This will help reduce your loneliness.  If  your relationships with family members are strained, remember that over-indulging in your favorite holiday comfort foods is not going to change how they behave towards you!  Create fun times for yourself. Having fun is a great way of reducing stress!  I hope these tips will help you not just get through the holidays, but that they’ll allow you to feel reassured that you can still have a fun and meaningful time without having to sacrifice your weight management goals. Wishing you a happy, healthy and meaningful holiday season!

## 2024-09-24 NOTE — PROGRESS NOTES
[facility] Weight Management follow up via video visit:    Subjective    This visit is conducted using Telemedicine with live, interactive video and audio.    Chief Complaint:  Routine follow up visit for lifestyle and medical management for overweight, obesity, or morbid obesity. LOV in clinic weight: 206#.    PMH reviewed. Bariatric surgery planned or hx of surgery in the past: no.    HPI:   Karlie Dash is a 39 year old female who is being followed up today for lifestyle and medical management as deemed appropriate for overweight, obesity or morbid obesity. Patient reports weight loss monitoring at home via home scale with a weight of 182#. This appears to be a weight loss since LOV 4 months ago in clinic. Patient has been consistent with medication and just started Zepbound 12.5 mg weekly. Was having constipation, but better now with smooth move tea.    Questions/Concerns/Comments since LOV: Labs completed and reviewed in EMR with significant improvement in triglycerides. She also reports BP medication was reduced and menstrual periods are less heavy. Feeling good about success. Goal weight 135-140#.    Lifestyle/Social Hx Reviewed:    [facility] Medical Weight Loss Follow Up    Question 9/23/2024  3:15 PM CDT - Filed by Patient   Please describe a success moment: buying a smaller pant size   Please describe a challenging moment/needs for improvement: still feeling like I look big   Please complete this 24 hour food journal, listing everything you had to eat in the past day. Include the average time of day you ate these meals at    List foods, qty and prep for breakfast: protein shake   List foods, qty and prep for lunch. sourdough avocado toast with egg   List foods, qty and prep for dinner. steak tacos with onions   List foods, qty and prep for snacks. cucumber with lime, prunes   List the types and qty of fluids consumed 40 oz   On average, how many meals did you eat out per week? 3   Exercise    How many days  per week are you active or exercise 1   On average, how many days were anaerobic (strength/resistance) exercises performed? 0   On average, how many days were aerobic (cardio) exercises performed? 1   Perceived level of exertion on a scale of 1-5, with 5 being very intense: 1   Stress    Average stress level on a scale of 1-10, with 10 being extremely stressed: 6   If greater than 5/1O how would you grade your coping mechanisms? moderate   Sleep hours and integrity    How many hours of uninterrupted sleep do you get a night: 7   Do you feel rested in the morning: No   If no, what may have been disrupting your sleep? jaw clenching   Please list any goal(s) for your next visit start a better exercise regimen       ROS  General: feeling well, denies fatigue  EYES: denies vision changes or high pain/pressure.  CV: denies cp, palpitations  Resp: denies sob  GI: denies abdominal pain. Denies N/V/D/C.  Neuro: denies paresthesia or cognitive changes  Psych: denies any mood changes    Physical Exam:  >   BP Readings from Last 3 Encounters:   07/22/24 118/82   07/12/24 112/80   06/14/24 116/88       Home weight: see above  Home BP: not available  Home blood sugars: n/a  Today's calculated BMI from reported weight: 31.7    General: patient speaking in full sentences, no increased work of breathing. alert, appears stated age, cooperative, no distress, and mildly obese  HENT: normocephalic  Resp: Breathing is non labored  Psych: patient appears cheerful, smiling, making good eye contact    Diagnoses and all orders for this visit:    Encounter for therapeutic drug monitoring  -     Tirzepatide-Weight Management (ZEPBOUND) 15 MG/0.5ML Subcutaneous Solution Auto-injector; Inject 15 mg into the skin once a week.    Class 3 severe obesity with serious comorbidity and body mass index (BMI) of 40.0 to 44.9 in adult, unspecified obesity type (HCC)  -     Tirzepatide-Weight Management (ZEPBOUND) 15 MG/0.5ML Subcutaneous Solution  Auto-injector; Inject 15 mg into the skin once a week.    Hypertriglyceridemia  -     Tirzepatide-Weight Management (ZEPBOUND) 15 MG/0.5ML Subcutaneous Solution Auto-injector; Inject 15 mg into the skin once a week.    Hypertension, unspecified type    Prediabetes  -     Tirzepatide-Weight Management (ZEPBOUND) 15 MG/0.5ML Subcutaneous Solution Auto-injector; Inject 15 mg into the skin once a week.    Gastroesophageal reflux disease, unspecified whether esophagitis present        Patient Instructions   Continue making lifestyle changes that focus on good nutrition, regular exercise and stress management.    Medication Plan: Finish up Zepbound 12.5 mg weekly box and then increase to full maintenance dose Zepbound 15 mg weekly.    Next steps to work on before next office visit include: Keep up the dedication and discipline as you take on the next goal of building your fitness routine!      Motivation gets you going, but DISCIPLINE keeps you GROWING!     Discipline is the bridge between goals and accomplishments        Developing a balanced fitness routine takes time. Begin to build the mentality of fitness 4 function! Start gradually and safely to continue to lose and maintain weight loss, build strength and prevent injury. Fitness not only supports a healthy body, but also a healthy mind with reducing stress and lifting your mood. I recommend a routine of 3x/week of cardio with varying intensity, 3x/week of strength training and 1x/week of stretching/flexibility/balance- such as Yoga.  Three ingredients necessary for making a habit of exercise for a lifetime includes: convenience, budget friendly and most importantly FUN! Changing up your exercise routine seasonally can keep you motivated and expose you to new interests and challenges! Step outside your comfort zone and give it a try, you never know where the challenge will lead you and what changes you may see!    Learning to Apply the FITT Principle to Your  Exercise Plan  One of the biggest challenges with exercise is knowing where to start and how to get better. To improve your fitness, you must self-monitor your workouts and make changes when necessary. One of the best tools you can use to help you is the FITT Principle.  FITT is an acronym that outlines the basic components of a successful exercise plan.  Frequency - How often you exercise  Intensity - How hard you exercise  Time - How long you exercise  Type - What kind of exercise you do  How Often Should You Exercise?  It is a myth that you must work out for extended periods every day to lose weight and keep it off. What is considered an “effective” exercise varies between people. Factors that affect this include age, fitness level, mobility, health conditions, etc.  Before you begin an exercise plan and decide how often to exercise, consider these key factors.  What is your current fitness level? What are you able to do?  What is your schedule? How much time do you have to exercise?  What health and fitness goals do you want to achieve?  Build your plan off of the answers to these questions. If you are a busy mom and you want to lose weight to gain more energy, you might not have a lot of time. Maybe your only form of exercise is keeping up with your kids. In this case, you may want to start small. For example, you could plan workouts for weekend afternoons when your spouse can watch the kids.  How Hard Should You Exercise?  The best way to see how hard you are working is to monitor your heart rate. You can do this by wearing a fitness tracker, heart rate monitor or smart watch. You can also feel for your heartbeat and count it over a 15-second period.  Low-intensity - An activity level you can continue for a long time (walking)  Moderate-intensity - An activity level that will boost your heart rate and require effort to maintain (biking)  High-intensity - An activity level that feels like an all-out effort.  Your heart rate is high and you can't speak complete sentences between breaths  How Long Should You Exercise?  The time you spend exercising will usually depend on what you are doing. Health experts recommend at least 30 minutes of cardio exercise each workout. However, if you are doing a strength-based exercise, you will likely pay more attention to your number of “sets” and “reps.” Regardless, many other factors are involved.  The amount of time you have  How long it takes you to feel fatigued  Weather, time of day  Health conditions  What Should You Do for Exercise?  Should you hit up the elliptical at the gym? Should you go for a hike? The type of exercise you do depends on what you like and what results you want.  For example, if you want to improve your cardio-vascular fitness and you love the outdoors, try exercises like hiking, swimming and biking. If you want to improve your muscle strength and you enjoy the convenience of the gym, try using free weights or machine weights. You can also use your body weight for exercises like push-ups, chin-ups, planks, etc.  The FITT Principle: Final Considerations  You can use the FITT Principle for cardio exercise, strength-based exercise, stretching and more. However, before you start any exercise plan, first consult with your healthcare provider. They will help you develop a plan that is safe and effective. By using the FITT Principle, you can not only improve your fitness level with time, but you can also prevent serious injury.       the Weights and Get Off the Treadmill for Faster Results    by Chris Tripp, PhD, CISSN, ACE-CPT at [facility] https://www.obesityaction.org/resources/pick-up-the-weights/    Working out and improving your fitness can seem like a daunting task when you don’t know where to begin, which type of exercise is best, or how long you should work out. I want to help clear up any confusion and tell you what I tell my own clients -  strength training is the most important thing you can do. Even if you only have 30 minutes.    The benefits of strength training are tremendous and superior in the long run (pun intended) to cardiorespiratory exercise. Cardio activities like running, cycling, rowing and others are extremely important, but if you are looking to prioritize what you do for physical activity, strength training is crucial.    What is Strength Training?    Strength training is a physical exercise that uses resistance to build muscular strength and endurance. You can use free weights, exercise machines, resistance bands or your own body weight!    Strength training can help you build muscle mass, increase bone density and improve your metabolism. As a bonus, beginners will have greater gains early on than someone who’s been strength training for many years.    What strength training does to improve your health:    Burn more fat: Muscle is more metabolically active than fat, so the more you have, the more calories you burn all day - even at the same activity level. Don’t believe the myth that a pound of muscle burns 50 calories more a day than fat. The math isn’t accurate; however, muscle does burn more energy and requires more calories during exercise, so moving them more in the gym and throughout your day will burn more calories.    Prevent injury: Strong muscles mean strong, supported bones and connective tissue, which will all help your body withstand higher levels of physical stress without injury. This is a positive side effect at any age, but especially as we get older and are more prone to falling.    Improve overall health: Studies show resistance training can enhance heart and bone health, reduce blood pressure, lower cholesterol, increase bone density, reduce low back pain, improve sleep, and ease symptoms of arthritis and fibromyalgia. Getting older isn’t easy, but the longer you can stay healthy and active, the  better.    Improve mood: Strength training releases feel-good endorphins, which reduce anxiety and can even help fight depression. Beyond supporting mental health through endorphins, strength training will help improve your self-esteem and body image.    Now that you know the numerous benefits of strength training, the next step is getting started. Always start with the basics and remember that it’s better to establish a healthy habit than it is to worry about being perfect. The perfect workout is the one that is right for you at this moment in your life.    Here are some simple steps to get you started:    Start with bodyweight exercises like squats, lunges, push-ups and planks.  Gradually add weight to your routine as you get stronger.  Focus on compound exercises that work multiple muscle groups at once. Compound exercises involve more than one joint. For example, a squat involves the hip, knee and ankle.  Aim for 2-3 strength training sessions per week.  If you are relatively new to the gym life, you may feel a bit intimidated or even uncomfortable with strength training. Don’t let ‘gym-timidation’ take away from your fitness goals. There are many ways to overcome that feeling and crush those workouts.    Start small: Begin with short workouts at home or outside.    Find a workout julia: Partner up to make workouts more enjoyable and less intimidating.    Try group fitness classes: Find your favorite class led by an instructor who can motivate and guide you through your workout. Group classes are great for beginners because you don’t feel like you are struggling alone. You may even find a workout partner for future sessions.    Hire a : Invest in yourself and hire a  for customized training sessions to help meet your needs and goals. You can even hire me to help you get comfortable with those home strength-training workouts.    If going to the gym seems like an impossibility at  this point, don’t worry -- you’re not alone. About one-third of respondents in a recent study said they’re too self-conscious to join a gym. If this sounds like you, I’d recommend you visit and ‘interview’ several gyms or fitness studios before deciding that going to a gym isn’t for you. Visit at a time you would normally go to see how crowded it is, what they offer, what amenities they have, and whether they specialize in specific activities such as strength training or boxing.      Holiday Weight and How to Avoid It    [employer] by Rebeka Lucero, PhD  https://www.obesityaction.org/resources/holiday-weight-and-pvm-nd-atcdt-it/      When we think about the holidays many things come to mind - gifts, shopping, parties, family, decorating, long to-do lists --and delicious holiday treats.    Strategies for Avoiding Holiday Weight Gain  The holiday season is a busy time, and there are eating opportunities everywhere we go, such as family gatherings, office parties with trays of home-baked treats in the lunchroom, holiday and [id number] programs at our kids’ schools, treat samples being given away as we make our way through the stores to do our holiday shopping and catalogs in our mailboxes with mouth-watering photo spreads on every page. We’re really busy, perhaps too busy to prepare the healthy meals we might otherwise prepare.    Here are a few tips that will help you negotiate this joyful time with minimum risk to your weight management goals:    Focus on maintaining your current weight. Challenging yourself to lose weight over the holidays is setting yourself up for failure.  Don’t gorge on any special holiday food because you only get to eat it once a year. With luck, you’ll still be around to enjoy it next year. On the other hand, don’t deprive yourself of anything you want to taste. Instead, take a mindful bite, savoring the sight, taste, aroma, mouth feel and sound of each special holiday  treat. Eating like this leads to increased pleasure, quicker satisfaction and decreased risk for weight gain.  Avoid the trap of thinking you can eat what you want because you can just start over in the New Year. It doesn’t get any easier just because it’s January - there are always other reasons to indulge and to celebrate.  Keep up your exercise routine. This will also help reduce holiday stress.  Keep tabs on yourself. Write down what you eat, weigh yourself if you want to or try on your favorite clothes to make sure they still fit.  Create meaning beyond the food by creating new traditions that have nothing to do with food. For example, change “in our family we always have chocolate cinnamon bread with whipped cream on Nemo morning” into “in our family we always play in the snow (or on the beach), or go for a long walk/take food and gifts to the homeless shelter on Nemo morning.”  Sometimes, eating a particular food is our way of remembering a lost loved one. If that applies to you, find another way to remember them, like sharing memories with family members.  Remember all the reasons why reaching and maintaining a healthy weight is important to you.  Remember, unless you’re an elite athlete, you’re unlikely to be able to “exercise off” weeks of overindulgence.  Strategies for Holiday Parties  Most of us love holiday parties and look forward to them all year. We get to dress up and go to nice places, spend time with our nearest and dearest, enjoy our favorite holiday music and engage in the traditions that are meaningful to us. Despite all of the excitement, parties can also be minefields when it comes to honoring our healthy lifestyle goals. When we love parties, we may over-indulge as a way of intensifying the positive emotions we’re already feeling, and when we dislike parties, we may over-indulge in an effort to distract ourselves from the emotional discomfort that we’re feeling.    Here are a few  tips that will help you get through holiday parties without sabotaging your goals:    Avoid wearing baggy clothing that allows you to expand as you eat.  After you’ve eaten, stay away from the food tables at the party.  Keep your hands busy by finding a way to help out. It’s the best way to distract yourself from the food.  Avoid alcohol. When we drink, we’re more likely to abandon healthy eating.  Fill up with water and other low-calorie drinks.  Take a healthy dish for the pot luck - something you can eat: consider salad, fruit, raw vegetables and a healthy dip.  Focus on your relationships, not on the food - learn to focus on enjoying the people and the special holiday experiences, on building special memories for yourself and your family.  Meeting new people is another good way of distracting yourself from the food. If you’re shy, simply be a good listener.  Plan ahead. The best kind of plan, when it comes to food, is about what you are going to eat - not about what you’re not going to eat. If we focus on what we can’t eat (or what we think we shouldn’t eat), this kind of thinking can set us up for failure because it simply leaves us feeling deprived.  Don’t arrive completely famished - you’ll be more likely to eat in a way you’ll later regret. Plan to eat on the light side both before and after the event. Think about your meal plan for the day, and leave yourself some room to eat at the party.  Coping with Holiday Stress  As you know, the holiday season can be joyful and stressful at the same time. There’s so much to do, being around family can sometimes be difficult and often, we set ourselves the goal of creating the “perfect” holiday. Being stressed puts us at risk for stress-based eating in an effort to cope.    Here are some strategies you can use to reduce your stress levels:    Focus on what you’re grateful for.  Practice deep breathing whenever you feel overwhelmed.  Keep up your exercise  routine.  Remind yourself to do just one thing at a time.  Remember -- you cannot do more than your best.  Be willing to say “no” to some events, tasks or requests. Sometimes this is the best way we can take care of ourselves.  Create a holiday season schedule for yourself. Schedule and prioritize everything you need to get done.  Reduce your expectations - aim for “good enough,” not “perfect.”  If you’re alone during the holidays, pamper yourself and find a way to help others who are less fortunate. This will help reduce your loneliness.  If your relationships with family members are strained, remember that over-indulging in your favorite holiday comfort foods is not going to change how they behave towards you!  Create fun times for yourself. Having fun is a great way of reducing stress!  I hope these tips will help you not just get through the holidays, but that they’ll allow you to feel reassured that you can still have a fun and meaningful time without having to sacrifice your weight management goals. Wishing you a happy, healthy and meaningful holiday season!        Return in about 4 months (around 1/24/2025) for weight management via clinic.    DOCUMENTATION OF TIME SPENT: Code selection for this visit was based on time spent : 30 minutes on date of service in preparing to see the patient, obtaining and/or reviewing separately obtained history, performing a medically appropriate examination, counseling and educating the patient/family/caregiver, ordering medications or testing, referring and communicating with other healthcare providers, documenting clinical information in the electronic medical record, independently interpreting results and communicating results to the patient/family/caregiver and care coordination with the patient's other providers.    Answers submitted by the patient for this visit:  Medical Weight Loss Follow Up (Submitted on 9/23/2024)  If greater than 5/1O how would you grade your coping  mechanisms?: moderate

## 2024-10-01 ENCOUNTER — OFFICE VISIT (OUTPATIENT)
Dept: INTERNAL MEDICINE CLINIC | Facility: CLINIC | Age: 39
End: 2024-10-01
Payer: COMMERCIAL

## 2024-10-01 VITALS
TEMPERATURE: 97 F | OXYGEN SATURATION: 100 % | SYSTOLIC BLOOD PRESSURE: 120 MMHG | WEIGHT: 185.19 LBS | RESPIRATION RATE: 17 BRPM | DIASTOLIC BLOOD PRESSURE: 70 MMHG | BODY MASS INDEX: 31.62 KG/M2 | HEART RATE: 84 BPM | HEIGHT: 64.17 IN

## 2024-10-01 DIAGNOSIS — F32.A ANXIETY AND DEPRESSION: ICD-10-CM

## 2024-10-01 DIAGNOSIS — F41.9 ANXIETY AND DEPRESSION: ICD-10-CM

## 2024-10-01 DIAGNOSIS — G43.109 MIGRAINE WITH AURA AND WITHOUT STATUS MIGRAINOSUS, NOT INTRACTABLE: ICD-10-CM

## 2024-10-01 DIAGNOSIS — R06.81 APNEA: ICD-10-CM

## 2024-10-01 DIAGNOSIS — E78.2 MIXED HYPERLIPIDEMIA: ICD-10-CM

## 2024-10-01 DIAGNOSIS — Z00.00 WELLNESS EXAMINATION: Primary | ICD-10-CM

## 2024-10-01 DIAGNOSIS — Z23 NEEDS FLU SHOT: ICD-10-CM

## 2024-10-01 DIAGNOSIS — E66.811 CLASS 1 OBESITY DUE TO EXCESS CALORIES WITHOUT SERIOUS COMORBIDITY WITH BODY MASS INDEX (BMI) OF 31.0 TO 31.9 IN ADULT: ICD-10-CM

## 2024-10-01 DIAGNOSIS — G47.8 UNREFRESHED BY SLEEP: ICD-10-CM

## 2024-10-01 DIAGNOSIS — I10 CHRONIC HYPERTENSION: ICD-10-CM

## 2024-10-01 DIAGNOSIS — E66.09 CLASS 1 OBESITY DUE TO EXCESS CALORIES WITHOUT SERIOUS COMORBIDITY WITH BODY MASS INDEX (BMI) OF 31.0 TO 31.9 IN ADULT: ICD-10-CM

## 2024-10-01 PROBLEM — N93.9 ABNORMAL UTERINE BLEEDING (AUB): Status: RESOLVED | Noted: 2024-05-01 | Resolved: 2024-10-01

## 2024-10-01 PROCEDURE — 99395 PREV VISIT EST AGE 18-39: CPT | Performed by: INTERNAL MEDICINE

## 2024-10-01 PROCEDURE — 3008F BODY MASS INDEX DOCD: CPT | Performed by: INTERNAL MEDICINE

## 2024-10-01 PROCEDURE — 3078F DIAST BP <80 MM HG: CPT | Performed by: INTERNAL MEDICINE

## 2024-10-01 PROCEDURE — 3074F SYST BP LT 130 MM HG: CPT | Performed by: INTERNAL MEDICINE

## 2024-10-01 PROCEDURE — 90656 IIV3 VACC NO PRSV 0.5 ML IM: CPT | Performed by: INTERNAL MEDICINE

## 2024-10-01 PROCEDURE — 90471 IMMUNIZATION ADMIN: CPT | Performed by: INTERNAL MEDICINE

## 2024-10-01 RX ORDER — PROPRANOLOL HCL 10 MG
10 TABLET ORAL 2 TIMES DAILY
Qty: 28 TABLET | Refills: 0 | Status: SHIPPED | OUTPATIENT
Start: 2024-10-01 | End: 2024-10-15

## 2024-10-01 RX ORDER — ALPRAZOLAM 0.25 MG
0.25 TABLET ORAL EVERY 6 HOURS PRN
Qty: 30 TABLET | Refills: 0 | Status: SHIPPED | OUTPATIENT
Start: 2024-10-01

## 2024-10-01 NOTE — PROGRESS NOTES
Karlie Dash  5/14/1985    Chief Complaint   Patient presents with    Physical     Pt sees gyne for breast exam and pap      SUBJECTIVE   Karlie Dash is a 39 year old female who presents for physical examination.    HDL 38  Triglycerides 193  VLDL 31    She would like to wean off SSRI. She is currently seeing a therapist and processing past traumas. Needs refill of Alprazolam which she takes very seldomly.     Due to normotension and no recent migraines would like to stop Propranolol as well.    Recently saw Ob/Gyne for annual examination.    Intermittent pain on bilateral hypothenar eminences. Types for work and rests elbows on desk.    Review of Systems   Review of Systems   No f/c/chest pain or sob. No cough. No abd pain/n/v/d. No ha or dizziness. No numbness, tingling, or weakness. No other complaints today.    OBJECTIVE:   /70   Pulse 84   Temp 97.4 °F (36.3 °C) (Temporal)   Resp 17   Ht 5' 4.17\" (1.63 m)   Wt 185 lb 3.2 oz (84 kg)   LMP 04/01/2024   SpO2 100%   BMI 31.62 kg/m²   Physical Exam   Constitutional: Oriented to person, place, and time. No distress.   HEENT:  Normocephalic and atraumatic. Tympanic membranes appear normal.  Nose normal. Oropharynx is clear and moist.   Eyes: Conjunctivae wnl. PERRLA.  Neck: Normal range of motion. Neck supple.   Cardiovascular: Normal rate, regular rhythm and intact distal pulses.  No murmur, rubs or gallops.   Pulmonary/Chest: Effort normal and breath sounds normal. No respiratory distress.  Abdominal: Soft. Bowel sounds are present. Non tender, no masses, no organomegaly or hernias.  Musculoskeletal: No edema  Lymphadenopathy: No cervical adenopathy.   Neurological: No focal neurological deficits. CN II-XII are intact.  Skin: Skin is warm and dry. No rash on visualized skin.  Psychiatric: Normal mood and affect.     Lab Results   Component Value Date    GLU 87 09/20/2024    BUN 16 09/20/2024    CREATSERUM 0.98 09/20/2024    ANIONGAP 7 09/20/2024     CA 10.1 09/20/2024     09/20/2024    K 4.3 09/20/2024     09/20/2024    CO2 26.0 09/20/2024    OSMOCALC 287 09/20/2024      Lab Results   Component Value Date    WBC 5.3 09/20/2024    RBC 4.69 09/20/2024    HGB 13.3 09/20/2024    HCT 39.8 09/20/2024    MCV 84.9 09/20/2024    MCH 28.4 09/20/2024    MCHC 33.4 09/20/2024    RDW 17.4 09/20/2024    .0 09/20/2024      Lab Results   Component Value Date    T4F 1.1 02/16/2024    TSH 1.689 09/20/2024        ASSESSMENT AND PLAN:       ICD-10-CM    1. Wellness examination  Z00.00       2. Needs flu shot  Z23 Fluzone trivalent vaccine, PF 0.5mL, 6mo+ (51461)      3. Class 1 obesity due to excess calories without serious comorbidity with body mass index (BMI) of 31.0 to 31.9 in adult  E66.811 Management per [facility]. Tolerating GLP-1.    E66.09     Z68.31       4. Anxiety and depression  F41.9 ALPRAZolam 0.25 MG Oral Tab  Weaning SSRI over the next 2 weeks then stop.     F32.A       5. Mixed hyperlipidemia  E78.2 Encouraged lifestyle modifications including diet and exercise.      6. Chronic hypertension  I10 propranolol 10 MG Oral Tab  Has been normotensive for some time. Trying to taper off BB.       7. Migraine with aura and without status migrainosus, not intractable  G43.109 propranolol 10 MG Oral Tab  Tapering off given improvement in migraines.      STOP-BANG 3 points. Will order home sleep study.      The patient indicates understanding of these issues and agrees to the plan.  The patient is asked to return or present to the emergency room for worsening of symptoms.    TODAY'S ORDERS     No orders of the defined types were placed in this encounter.      Meds & Refills:  Requested Prescriptions      No prescriptions requested or ordered in this encounter       Imaging & Consults:  None    No follow-ups on file.  There are no Patient Instructions on file for this visit.    All questions were answered and the patient agrees with the plan.      Thank you,  Tuan Hughes, DO

## 2024-10-02 ENCOUNTER — TELEPHONE (OUTPATIENT)
Dept: FAMILY MEDICINE CLINIC | Facility: CLINIC | Age: 39
End: 2024-10-02

## 2024-10-02 NOTE — TELEPHONE ENCOUNTER
Close reason: Other   Note from payer: This request cannot be processed due to the member's coverage has terminated. Resubmit using active member ID information. - Prescriber details have been updated to match the prescriber directory.

## 2024-10-03 NOTE — TELEPHONE ENCOUNTER
Pharmacy notified that coverage ended. They will contact her to obtain her new pharmacy benefit.

## 2024-10-21 ENCOUNTER — TELEPHONE (OUTPATIENT)
Dept: INTERNAL MEDICINE CLINIC | Facility: CLINIC | Age: 39
End: 2024-10-21

## 2024-10-21 NOTE — TELEPHONE ENCOUNTER
Approved    Authorized from October 21, 2024 to April 19, 2025     Patient notified via [employer]

## 2024-10-21 NOTE — TELEPHONE ENCOUNTER
PA needed for zepbound 15 mg dose  [facility]  BIN [id number]  PCN  WG  GROU [facility]  ID [id number]    Call  [phone]    I was able to enter in epic today  Sent first visit note and most recent to show weight loss.  Awaiting approval or denial

## 2024-11-07 ENCOUNTER — PATIENT MESSAGE (OUTPATIENT)
Dept: INTERNAL MEDICINE CLINIC | Facility: CLINIC | Age: 39
End: 2024-11-07

## 2024-11-12 ENCOUNTER — NURSE TRIAGE (OUTPATIENT)
Dept: INTERNAL MEDICINE CLINIC | Facility: CLINIC | Age: 39
End: 2024-11-12

## 2024-11-12 NOTE — TELEPHONE ENCOUNTER
Patient left the following message while making an appointment through my chart. Sending to be triaged      Appointment for: Karlie Dash ([id number])   [id number]   11/15/2024 1:20 PM (20 minutes) with Tuan Hughes in [address]      Patient comments:   Blood pressure. Has been high the past few weeks and have headaches/dizziness

## 2024-11-12 NOTE — TELEPHONE ENCOUNTER
Action Requested: Summary for Provider     []  Critical Lab, Recommendations Needed  [] Need Additional Advice  []   FYI    []   Need Orders  [] Need Medications Sent to Pharmacy  []  Other     SUMMARY: RN to patient call, appropriate for appointment     Reason for call: Sick Call and Dizziness  Onset: 2 weeks       RN to patient call, patient verified symptoms started about two weeks ago, states she recently discontinued her propanolol, but within the last two weeks she has started having headaches again and dizziness, denies shortness of breath, chest pain, and fainting.   Started checking blood pressure at home and had readings that included 119/82, 112/83, and 134/110, this last reading was on Saturday and patient started her period that day and had bad cramps.   Denies dizziness or headache at this time.   ER/UC precautions advised.   Advised to continue checking blood pressure and bring readings to upcoming appointment.       Reason for Disposition   Dizziness not present now, but is a chronic symptom (recurrent or ongoing AND lasting > 4 weeks)    Protocols used: Dizziness-A-OH    Future Appointments   Date Time Provider Department Center   11/15/2024  1:20 PM Tuan Hughes DO [facility] [address] [facility]   11/26/2024  7:00 PM SCHEDULE BY DATE [facility]   2/6/2025  9:00 AM Mel Go APRN [facility]

## 2024-11-13 ENCOUNTER — TELEPHONE (OUTPATIENT)
Dept: INTERNAL MEDICINE CLINIC | Facility: CLINIC | Age: 39
End: 2024-11-13

## 2024-11-13 NOTE — TELEPHONE ENCOUNTER
Form received for Evaluation of Work Capacity .    Placed on Dr. Hughes's desk for completion.

## 2024-11-15 ENCOUNTER — OFFICE VISIT (OUTPATIENT)
Dept: INTERNAL MEDICINE CLINIC | Facility: CLINIC | Age: 39
End: 2024-11-15
Payer: COMMERCIAL

## 2024-11-15 VITALS
HEART RATE: 80 BPM | SYSTOLIC BLOOD PRESSURE: 118 MMHG | DIASTOLIC BLOOD PRESSURE: 80 MMHG | BODY MASS INDEX: 29.69 KG/M2 | WEIGHT: 178.19 LBS | OXYGEN SATURATION: 94 % | HEIGHT: 65 IN

## 2024-11-15 DIAGNOSIS — I10 PRIMARY HYPERTENSION: ICD-10-CM

## 2024-11-15 DIAGNOSIS — R26.89 LOSS OF BALANCE: Primary | ICD-10-CM

## 2024-11-15 DIAGNOSIS — G44.209 TENSION HEADACHE: ICD-10-CM

## 2024-11-15 PROCEDURE — 3074F SYST BP LT 130 MM HG: CPT | Performed by: INTERNAL MEDICINE

## 2024-11-15 PROCEDURE — 3079F DIAST BP 80-89 MM HG: CPT | Performed by: INTERNAL MEDICINE

## 2024-11-15 PROCEDURE — 3008F BODY MASS INDEX DOCD: CPT | Performed by: INTERNAL MEDICINE

## 2024-11-15 PROCEDURE — 99214 OFFICE O/P EST MOD 30 MIN: CPT | Performed by: INTERNAL MEDICINE

## 2024-11-15 RX ORDER — MECLIZINE HYDROCHLORIDE 25 MG/1
25 TABLET ORAL 3 TIMES DAILY PRN
Qty: 30 TABLET | Refills: 0 | Status: SHIPPED | OUTPATIENT
Start: 2024-11-15

## 2024-11-15 NOTE — PROGRESS NOTES
Karlie Dash  5/14/1985    Chief Complaint   Patient presents with    Follow - Up     Room 12, AS, pt is here to check high BP and dizziness for abut 2 weeks.       SUBJECTIVE   Karlie Dash is a 39 year old female who presents for evaluation of hypertension and headache and dizziness.    She has been having tension type headaches located on the sides of her head.  No associated visual changes, auditory changes or weakness.  Dizziness described as feeling off balance can occur with any activity including standing, sitting.  Lasts for a few seconds.  Over 1 month ago she had weaned off propranolol 10 mg twice daily.  She was okay weaning off the medication.  Dizziness started in the last 1-2 weeks.    Recent home blood pressure readings:  137/84  129/97  126/85  148/115  134/110    He denies palpitations, chest pain, shortness of breath.  Today she visited her chiropractor who told her she may have a pinched nerve and is ordered a radiograph of her spine.    Review of Systems   Review of Systems   No f/c/chest pain or sob. No cough. No abd pain/n/v/d. No ha or dizziness. No numbness, tingling, or weakness.     OBJECTIVE:   /80 (BP Location: Left arm, Patient Position: Sitting, Cuff Size: adult)   Pulse 80   Ht 5' 5\" (1.651 m)   Wt 178 lb 3.2 oz (80.8 kg)   LMP 10/09/2024   SpO2 94%   BMI 29.65 kg/m²   Physical Exam   Constitutional: Oriented to person, place, and time. No distress.   HEENT:  Normocephalic and atraumatic. Tympanic membranes appear normal.    Eyes: Conjunctivae wnl. PERRLA.  Neck: Normal range of motion. Neck supple.   Cardiovascular: Normal rate, regular rhythm and intact distal pulses.  No murmur, rubs or gallops.   Pulmonary/Chest: Effort normal and breath sounds normal. No respiratory distress.  Abdominal: Soft. Bowel sounds are present. Non tender, no masses, no organomegaly or hernias.  Musculoskeletal: No edema  Lymphadenopathy: No cervical adenopathy.   Neurological: No focal  neurological deficits. Cranial nerves II through XII are intact.  Jessica-Hallpike is normal.    Lab Results   Component Value Date    GLU 87 09/20/2024    BUN 16 09/20/2024    CREATSERUM 0.98 09/20/2024    ANIONGAP 7 09/20/2024    CA 10.1 09/20/2024     09/20/2024    K 4.3 09/20/2024     09/20/2024    CO2 26.0 09/20/2024    OSMOCALC 287 09/20/2024      Lab Results   Component Value Date    WBC 5.3 09/20/2024    RBC 4.69 09/20/2024    HGB 13.3 09/20/2024    HCT 39.8 09/20/2024    MCV 84.9 09/20/2024    MCH 28.4 09/20/2024    MCHC 33.4 09/20/2024    RDW 17.4 09/20/2024    .0 09/20/2024      Lab Results   Component Value Date    T4F 1.1 02/16/2024    TSH 1.689 09/20/2024        ASSESSMENT AND PLAN:       ICD-10-CM    1. Loss of balance  R26.89 meclizine 25 MG Oral Tab      2. Primary hypertension  I10       3. Tension headache  G44.209         The patient is normotensive today.  Heart rate is within range and rhythm is regular.  No focal neurological deficits.  May be adjusting to stopping propranolol?  Recent labs from September include normal TSH, normal metabolic panel and no evidence of anemia.  Instructed patient to check her blood pressure at least daily for the next 1 week.  Bring cuff with her to clinic next week so we can compare to in office manual cuff.  If symptoms persist will do EKG, neuroimaging repeat labs, etc.  TODAY'S ORDERS     No orders of the defined types were placed in this encounter.      Meds & Refills:  Requested Prescriptions      No prescriptions requested or ordered in this encounter       Imaging & Consults:  None    No follow-ups on file.  There are no Patient Instructions on file for this visit.    Code selection for this visit was based on time spent (35) on date of service in preparing to see the patient, obtaining and/or reviewing separately obtained history, performing a medically appropriate examination, counseling and educating the patient/family/caregiver,  ordering medications or testing, referring and communicating with other healthcare providers, documenting clinical information in the EHR, independently interpreting results and communicating results to the patient/family/caregiver and care coordination with the patient's other providers.      Thank you,  Tuan Hughes, DO

## 2024-11-22 ENCOUNTER — OFFICE VISIT (OUTPATIENT)
Dept: INTERNAL MEDICINE CLINIC | Facility: CLINIC | Age: 39
End: 2024-11-22
Payer: COMMERCIAL

## 2024-11-22 ENCOUNTER — LAB ENCOUNTER (OUTPATIENT)
Dept: LAB | Age: 39
End: 2024-11-22
Attending: INTERNAL MEDICINE
Payer: COMMERCIAL

## 2024-11-22 VITALS
OXYGEN SATURATION: 98 % | TEMPERATURE: 98 F | WEIGHT: 180 LBS | BODY MASS INDEX: 29.99 KG/M2 | HEART RATE: 92 BPM | SYSTOLIC BLOOD PRESSURE: 112 MMHG | HEIGHT: 65 IN | RESPIRATION RATE: 18 BRPM | DIASTOLIC BLOOD PRESSURE: 66 MMHG

## 2024-11-22 DIAGNOSIS — R42 DIZZINESS: ICD-10-CM

## 2024-11-22 DIAGNOSIS — D50.0 IRON DEFICIENCY ANEMIA DUE TO CHRONIC BLOOD LOSS: ICD-10-CM

## 2024-11-22 DIAGNOSIS — G44.209 TENSION HEADACHE: ICD-10-CM

## 2024-11-22 DIAGNOSIS — N93.9 ABNORMAL UTERINE BLEEDING (AUB): ICD-10-CM

## 2024-11-22 DIAGNOSIS — G44.209 TENSION HEADACHE: Primary | ICD-10-CM

## 2024-11-22 LAB
ALBUMIN SERPL-MCNC: 4.4 G/DL (ref 3.2–4.8)
ALBUMIN/GLOB SERPL: 1.8 {RATIO} (ref 1–2)
ALP LIVER SERPL-CCNC: 69 U/L
ALT SERPL-CCNC: 20 U/L
ANION GAP SERPL CALC-SCNC: 7 MMOL/L (ref 0–18)
AST SERPL-CCNC: 22 U/L (ref ?–34)
BASOPHILS # BLD AUTO: 0.05 X10(3) UL (ref 0–0.2)
BASOPHILS NFR BLD AUTO: 0.8 %
BILIRUB SERPL-MCNC: 0.3 MG/DL (ref 0.3–1.2)
BUN BLD-MCNC: 17 MG/DL (ref 9–23)
CALCIUM BLD-MCNC: 9.6 MG/DL (ref 8.7–10.4)
CHLORIDE SERPL-SCNC: 110 MMOL/L (ref 98–112)
CO2 SERPL-SCNC: 22 MMOL/L (ref 21–32)
CREAT BLD-MCNC: 0.95 MG/DL
DEPRECATED HBV CORE AB SER IA-ACNC: 3 NG/ML
EGFRCR SERPLBLD CKD-EPI 2021: 78 ML/MIN/1.73M2 (ref 60–?)
EOSINOPHIL # BLD AUTO: 0.08 X10(3) UL (ref 0–0.7)
EOSINOPHIL NFR BLD AUTO: 1.2 %
ERYTHROCYTE [DISTWIDTH] IN BLOOD BY AUTOMATED COUNT: 12.8 %
FASTING STATUS PATIENT QL REPORTED: NO
GLOBULIN PLAS-MCNC: 2.4 G/DL (ref 2–3.5)
GLUCOSE BLD-MCNC: 102 MG/DL (ref 70–99)
HCT VFR BLD AUTO: 31.7 %
HGB BLD-MCNC: 10.9 G/DL
IMM GRANULOCYTES # BLD AUTO: 0.01 X10(3) UL (ref 0–1)
IMM GRANULOCYTES NFR BLD: 0.2 %
IRON SATN MFR SERPL: 4 %
IRON SERPL-MCNC: 15 UG/DL
LYMPHOCYTES # BLD AUTO: 2.17 X10(3) UL (ref 1–4)
LYMPHOCYTES NFR BLD AUTO: 33.5 %
MCH RBC QN AUTO: 29.7 PG (ref 26–34)
MCHC RBC AUTO-ENTMCNC: 34.4 G/DL (ref 31–37)
MCV RBC AUTO: 86.4 FL
MONOCYTES # BLD AUTO: 0.55 X10(3) UL (ref 0.1–1)
MONOCYTES NFR BLD AUTO: 8.5 %
NEUTROPHILS # BLD AUTO: 3.62 X10 (3) UL (ref 1.5–7.7)
NEUTROPHILS # BLD AUTO: 3.62 X10(3) UL (ref 1.5–7.7)
NEUTROPHILS NFR BLD AUTO: 55.8 %
OSMOLALITY SERPL CALC.SUM OF ELEC: 290 MOSM/KG (ref 275–295)
PLATELET # BLD AUTO: 312 10(3)UL (ref 150–450)
POTASSIUM SERPL-SCNC: 4.4 MMOL/L (ref 3.5–5.1)
PROT SERPL-MCNC: 6.8 G/DL (ref 5.7–8.2)
RBC # BLD AUTO: 3.67 X10(6)UL
SODIUM SERPL-SCNC: 139 MMOL/L (ref 136–145)
TOTAL IRON BINDING CAPACITY: 382 UG/DL (ref 250–425)
TRANSFERRIN SERPL-MCNC: 300 MG/DL (ref 250–380)
TSI SER-ACNC: 0.85 UIU/ML (ref 0.55–4.78)
WBC # BLD AUTO: 6.5 X10(3) UL (ref 4–11)

## 2024-11-22 PROCEDURE — 3008F BODY MASS INDEX DOCD: CPT | Performed by: INTERNAL MEDICINE

## 2024-11-22 PROCEDURE — 80050 GENERAL HEALTH PANEL: CPT | Performed by: INTERNAL MEDICINE

## 2024-11-22 PROCEDURE — 3078F DIAST BP <80 MM HG: CPT | Performed by: INTERNAL MEDICINE

## 2024-11-22 PROCEDURE — 82728 ASSAY OF FERRITIN: CPT | Performed by: INTERNAL MEDICINE

## 2024-11-22 PROCEDURE — 3074F SYST BP LT 130 MM HG: CPT | Performed by: INTERNAL MEDICINE

## 2024-11-22 PROCEDURE — 83550 IRON BINDING TEST: CPT | Performed by: INTERNAL MEDICINE

## 2024-11-22 PROCEDURE — 83540 ASSAY OF IRON: CPT | Performed by: INTERNAL MEDICINE

## 2024-11-22 PROCEDURE — G2211 COMPLEX E/M VISIT ADD ON: HCPCS | Performed by: INTERNAL MEDICINE

## 2024-11-22 PROCEDURE — 99213 OFFICE O/P EST LOW 20 MIN: CPT | Performed by: INTERNAL MEDICINE

## 2024-11-22 NOTE — PROGRESS NOTES
Karlie Dash  5/14/1985    Chief Complaint   Patient presents with    Blood Pressure     Follow up blood pressure      SUBJECTIVE   Karlie Dash is a 39 year old female who presents for follow-up.  She was seen by me on 11/15 for headache and dizziness.  No focal neurologic deficits on exam.  At that time patient was concern for hypertension but was normotensive.  She has checked her blood pressure all week and has been normotensive.  She is normotensive again in clinic today.  On 11/8   Her menses started.  She is still bleeding now.  She is soaking through tampons and pads.  She wakes up almost every hour at night to change.  Her  noticed that she looks pale.    Review of Systems   Review of Systems   No f/c/chest pain or sob. No cough. No abd pain/n/v/d.  No numbness, tingling, or weakness.     OBJECTIVE:   /66   Pulse 92   Temp 98.1 °F (36.7 °C)   Resp 18   Ht 5' 5\" (1.651 m)   Wt 180 lb (81.6 kg)   LMP 10/09/2024   SpO2 98%   BMI 29.95 kg/m²   Physical Exam   Constitutional: Oriented to person, place, and time. No distress.   HEENT:  Normocephalic and atraumatic. Tympanic membranes appear normal.  Nose normal. Oropharynx is clear and moist.   Eyes: Conjunctivae wnl. PERRLA.  Minimal conjunctival pallor.    Neck: Normal range of motion. Neck supple.   Cardiovascular: Normal rate, regular rhythm and intact distal pulses.  No murmur, rubs or gallops.   Pulmonary/Chest: Effort normal and breath sounds normal. No respiratory distress.  Musculoskeletal: No edema  Neurological: No focal neurological deficits.  Cranial nerves II through XII are intact.    Lab Results   Component Value Date    GLU 87 09/20/2024    BUN 16 09/20/2024    CREATSERUM 0.98 09/20/2024    ANIONGAP 7 09/20/2024    CA 10.1 09/20/2024     09/20/2024    K 4.3 09/20/2024     09/20/2024    CO2 26.0 09/20/2024    OSMOCALC 287 09/20/2024      Lab Results   Component Value Date    WBC 5.3 09/20/2024    RBC 4.69  09/20/2024    HGB 13.3 09/20/2024    HCT 39.8 09/20/2024    MCV 84.9 09/20/2024    MCH 28.4 09/20/2024    MCHC 33.4 09/20/2024    RDW 17.4 09/20/2024    .0 09/20/2024      Lab Results   Component Value Date    T4F 1.1 02/16/2024    TSH 1.689 09/20/2024        ASSESSMENT AND PLAN:       ICD-10-CM    1. Tension headache  G44.209 CBC W Differential W Platelet [E]     Comp Metabolic Panel (14) [E]     TSH W Reflex To Free T4 [E]     Ferritin [E]     Iron And Tibc [E]      2. Abnormal uterine bleeding (AUB)  N93.9 CBC W Differential W Platelet [E]     Ferritin [E]     Iron And Tibc [E]      3. Dizziness  R42 CBC W Differential W Platelet [E]     Comp Metabolic Panel (14) [E]     TSH W Reflex To Free T4 [E]      4. Iron deficiency anemia due to chronic blood loss  D50.0 CBC W Differential W Platelet [E]     Ferritin [E]     Iron And Tibc [E]      Patient following up today for headache and lightheaded.  She remains normotensive.  Today she is states that she has had heavy menstrual bleeding for over 2 weeks.  She has a history of AUB and iron deficiency anemia requiring IV iron.  Checking labs as above.  Start Slow Fe.  The patient is going to contact her gynecologist. If needed referring back to hematology      TODAY'S ORDERS     No orders of the defined types were placed in this encounter.      Meds & Refills:  Requested Prescriptions      No prescriptions requested or ordered in this encounter       Imaging & Consults:  None    No follow-ups on file.  There are no Patient Instructions on file for this visit.    All questions were answered and the patient agrees with the plan.     Thank you,  uTan Hughes, DO

## 2024-11-26 ENCOUNTER — OFFICE VISIT (OUTPATIENT)
Dept: HEMATOLOGY/ONCOLOGY | Facility: HOSPITAL | Age: 39
End: 2024-11-26
Attending: INTERNAL MEDICINE
Payer: COMMERCIAL

## 2024-11-26 ENCOUNTER — OFFICE VISIT (OUTPATIENT)
Dept: SLEEP CENTER | Age: 39
End: 2024-11-26
Attending: INTERNAL MEDICINE
Payer: COMMERCIAL

## 2024-11-26 VITALS
DIASTOLIC BLOOD PRESSURE: 83 MMHG | HEIGHT: 65 IN | BODY MASS INDEX: 29.16 KG/M2 | OXYGEN SATURATION: 100 % | SYSTOLIC BLOOD PRESSURE: 122 MMHG | RESPIRATION RATE: 16 BRPM | HEART RATE: 80 BPM | TEMPERATURE: 98 F | WEIGHT: 175 LBS

## 2024-11-26 VITALS — DIASTOLIC BLOOD PRESSURE: 75 MMHG | HEART RATE: 57 BPM | SYSTOLIC BLOOD PRESSURE: 119 MMHG | RESPIRATION RATE: 16 BRPM

## 2024-11-26 DIAGNOSIS — G47.8 UNREFRESHED BY SLEEP: Primary | ICD-10-CM

## 2024-11-26 DIAGNOSIS — N92.0 MENORRHAGIA WITH REGULAR CYCLE: ICD-10-CM

## 2024-11-26 DIAGNOSIS — D50.0 IRON DEFICIENCY ANEMIA DUE TO CHRONIC BLOOD LOSS: Primary | ICD-10-CM

## 2024-11-26 DIAGNOSIS — R06.81 APNEA: ICD-10-CM

## 2024-11-26 PROCEDURE — G2211 COMPLEX E/M VISIT ADD ON: HCPCS | Performed by: INTERNAL MEDICINE

## 2024-11-26 PROCEDURE — 95806 SLEEP STUDY UNATT&RESP EFFT: CPT

## 2024-11-26 PROCEDURE — 99215 OFFICE O/P EST HI 40 MIN: CPT | Performed by: INTERNAL MEDICINE

## 2024-11-26 PROCEDURE — 96365 THER/PROPH/DIAG IV INF INIT: CPT

## 2024-11-26 NOTE — PROGRESS NOTES
Education Record    Learner:  Patient    Disease / Diagnosis: Iron deficiency anemia    Barriers / Limitations:  None   Comments:    Method:  Discussion   Comments:    General Topics:  Medication, Side effects and symptom management, and Plan of care reviewed   Comments:    Outcome:  Shows understanding   Comments:    Here for iron infusion and MD follow up. She had been feeling well for awhile and her periods were under control, but in November it changed. She is feeling dizziness, headaches, shortness of breath with exertion, and extreme fatigue. Her period flared up 11/9 and she has been bleeding ever since very heavily. Her primary checked her iron levels and they were very low.

## 2024-11-26 NOTE — PROGRESS NOTES
Hematology/Oncology Clinic Follow Up Visit    Patient Name: Karlie Dash  Medical Record Number: [id number]    YOB: 1985   PCP: Tuan Hughes DO    Reason for Consultation:  Karlie Dash was seen today for the diagnosis of iron deficiency anemia    Hematologic History:  2023: IV 1000mg infed  2024: IV 1000mg infed    History of Present Illness:      40 y/o F PMH iron deficiency anemia presenting for follow up.    - last got IV 1000mg infed 2023 for iron deficiency anemia of 8.3g/dL with resulting improvement of anemia. She also felt symptomatically much better  - saw PCP for dizziness, noted heavy menstrual bleeding, hgb 10.9g/dL with ferritin of 3  - she has seen gyn, taking provera. Has discussed potential procedures of menorrhagia but she has not decided what she wants to do    Past Medical History:  Past Medical History:    Essential hypertension    GERD    Hypertriglyceridemia    Migraines    Obesity     No past surgical history on file.    Home Medications:  No outpatient medications have been marked as taking for the 24 encounter (Appointment) with Tre Fagan MD.       Allergies:   Allergies[1]    Psychosocial History:  Social History     Socioeconomic History    Marital status:      Spouse name: Not on file    Number of children: 0    Years of education: Not on file    Highest education level: Not on file   Occupational History    Occupation: hr asst   Tobacco Use    Smoking status: Former     Current packs/day: 0.00     Types: Cigarettes     Quit date:      Years since quittin.9     Passive exposure: Past    Smokeless tobacco: Former    Tobacco comments:     started     Vaping Use    Vaping status: Never Used   Substance and Sexual Activity    Alcohol use: Not Currently     Alcohol/week: 0.0 - 2.0 standard drinks of alcohol     Comment: 2 per week    Drug use: No    Sexual activity: Yes     Partners: Male   Other Topics Concern      Service Not Asked    Blood Transfusions Not Asked    Caffeine Concern Yes     Comment: 2 servings/day    Occupational Exposure Not Asked    Hobby Hazards Not Asked    Sleep Concern Not Asked    Stress Concern Not Asked    Weight Concern Not Asked    Special Diet Not Asked    Back Care Not Asked    Exercise No    Bike Helmet Not Asked    Seat Belt Yes    Self-Exams Not Asked   Social History Narrative    Not on file     Social Drivers of Health     Financial Resource Strain: Not on file   Food Insecurity: Not on file   Transportation Needs: Not on file   Physical Activity: Not on file   Stress: Not on file (11/3/2024)   Social Connections: Not on file   Housing Stability: Not on file       Family Medical History:  Family History   Problem Relation Age of Onset    Hypertension Mother     Depression Maternal Grandmother     Heart Disease Paternal Grandmother        Review of Systems:  A 10-point ROS was done with pertinent positives and negative per the HPI    Vital Signs:  Height: --  Weight: --  BSA (Calculated - sq m): --  Pulse: --  BP: --  Temp: --  Do Not Use - Resp Rate: --  SpO2: --    Wt Readings from Last 6 Encounters:   11/22/24 81.6 kg (180 lb)   11/15/24 80.8 kg (178 lb 3.2 oz)   10/01/24 84 kg (185 lb 3.2 oz)   07/22/24 90.4 kg (199 lb 4 oz)   07/12/24 91.2 kg (201 lb)   06/14/24 93 kg (205 lb)       Physical Examination:  General: Patient is alert and oriented, not in acute distress  Psych: Mood and affect are appropriate  Eyes: EOMI, PERRL  ENT: Oropharynx is clear, no adenopathy  CV:  no LE edema  Respiratory: Non-labored respirations  GI/Abd: Soft, non-tender w  Neurological: Grossly intact   Lymphatics: No palpable cervical, supraclavicular, axillary, or inguinal lymphadenopathy  Skin: no rashes or petechiae    Laboratory:  Lab Results   Component Value Date    WBC 6.5 11/22/2024    WBC 5.3 09/20/2024    WBC 6.2 04/25/2024    HGB 10.9 (L) 11/22/2024    HGB 13.3 09/20/2024    HGB 9.9 (L) 04/25/2024     HCT 31.7 (L) 11/22/2024    MCV 86.4 11/22/2024    MCH 29.7 11/22/2024    MCHC 34.4 11/22/2024    RDW 12.8 11/22/2024    .0 11/22/2024    .0 09/20/2024    .0 04/25/2024     Lab Results   Component Value Date     (H) 11/22/2024    BUN 17 11/22/2024    CREATSERUM 0.95 11/22/2024    CREATSERUM 0.98 09/20/2024    CREATSERUM 0.84 04/25/2024    ANIONGAP 7 11/22/2024    GFR >59 08/20/2010    CA 9.6 11/22/2024    OSMOCALC 290 11/22/2024    ALKPHO 69 11/22/2024    AST 22 11/22/2024    ALT 20 11/22/2024    BILT 0.3 11/22/2024    TP 6.8 11/22/2024    ALB 4.4 11/22/2024    GLOBULIN 2.4 11/22/2024    AGRATIO 1.9 06/15/2015     11/22/2024    K 4.4 11/22/2024     11/22/2024    CO2 22.0 11/22/2024     Lab Results   Component Value Date    INR 0.9 06/15/2015       Impression & Plan:     Iron deficiency anemia  - secondary to menstrual bleeding  - 1000mg IV infed today   - based on her pattern of iron deficiency, she may need iron infusion every 9-12 months  - I asked her to draw labs in 08/2025 to reassess her iron stores  - if she is iron deficient in future, will bring her back for further IV iron. Discussed she will likely need lifelong IV iron as long as she continues to have menorrhagia    Menorrhagia  - continue f/u with gyn    F/u in future for IV iron if when iron deficient in future    Tre Faagn  Hematology/Medical Oncology  [facility]              [1] No Known Allergies

## 2024-11-26 NOTE — PROGRESS NOTES
Education Record    Learner:  Patient    Disease / Diagnosis: AMBREEN    Barriers / Limitations:  None   Comments:    Method:  Brief focused   Comments:    General Topics:  Plan of care reviewed   Comments:    Outcome:  Shows understanding   Comments:    Infed infusion. Tolerated well without issue. Labs in August of next year.

## 2024-11-29 NOTE — TELEPHONE ENCOUNTER
Contacted pt due to scheduling error via [employer], pt accidentally scheduled herself with dr Corbin but is a Dr Hernandez pt. Rescheduled her accordingly doesn't want REGGIE. Coming in to see dr hernandez 12/24.  Pt advised she needs refill for eletriptan confirmed rx   [facility] [address] [place] [address] MATT JC. [phone], [phone]   . Pls advise.

## 2024-12-02 RX ORDER — ELETRIPTAN HYDROBROMIDE 40 MG/1
40 TABLET, FILM COATED ORAL AS NEEDED
Qty: 10 TABLET | Refills: 2 | Status: SHIPPED | OUTPATIENT
Start: 2024-12-02

## 2024-12-02 NOTE — TELEPHONE ENCOUNTER
Medication: Eletriptan Hydrobromide 40 MG Oral Tab      Date of last refill: 06/07/23 (#/)  [place]      Last office visit: 01/10/24  Due back to clinic per last office note:  3 months  Date next office visit scheduled:    Future Appointments   Date Time Provider Department Center   12/24/2024  9:00 AM Bang Chiang MD [facility]   2/6/2025  9:00 AM Mel Go APRN [facility]           Last OV note recommendation:    ASSESSMENT/PLAN:          ICD-10-CM     1. Migraine without aura and without status migrainosus, not intractable  G43.009            Patient is a 38-year-old female with history of chronic migraines presenting for evaluation of increased headaches    Iron def anemia and stress could be contributing factors  On Iron infusion and that sometime causes headache itself     She is already on propranolol for hypertension so recommend going up on the dose slightly to 80 mg for headache prevention     Continue Eletriptan as needed. Maintain a headache diary

## 2024-12-19 DIAGNOSIS — F41.9 ANXIETY AND DEPRESSION: ICD-10-CM

## 2024-12-19 DIAGNOSIS — F32.A ANXIETY AND DEPRESSION: ICD-10-CM

## 2024-12-24 ENCOUNTER — MED REC SCAN ONLY (OUTPATIENT)
Dept: NEUROLOGY | Facility: CLINIC | Age: 39
End: 2024-12-24

## 2024-12-24 ENCOUNTER — OFFICE VISIT (OUTPATIENT)
Dept: NEUROLOGY | Facility: CLINIC | Age: 39
End: 2024-12-24
Payer: COMMERCIAL

## 2024-12-24 ENCOUNTER — TELEPHONE (OUTPATIENT)
Dept: PHYSICAL MEDICINE AND REHAB | Facility: CLINIC | Age: 39
End: 2024-12-24

## 2024-12-24 VITALS
DIASTOLIC BLOOD PRESSURE: 63 MMHG | HEIGHT: 63 IN | SYSTOLIC BLOOD PRESSURE: 97 MMHG | OXYGEN SATURATION: 97 % | WEIGHT: 170 LBS | BODY MASS INDEX: 30.12 KG/M2 | HEART RATE: 86 BPM

## 2024-12-24 DIAGNOSIS — M26.609 TMJ DYSFUNCTION: ICD-10-CM

## 2024-12-24 DIAGNOSIS — G43.709 CHRONIC MIGRAINE W/O AURA W/O STATUS MIGRAINOSUS, NOT INTRACTABLE: Primary | ICD-10-CM

## 2024-12-24 PROCEDURE — 99213 OFFICE O/P EST LOW 20 MIN: CPT | Performed by: OTHER

## 2024-12-24 PROCEDURE — 3078F DIAST BP <80 MM HG: CPT | Performed by: OTHER

## 2024-12-24 PROCEDURE — 3008F BODY MASS INDEX DOCD: CPT | Performed by: OTHER

## 2024-12-24 PROCEDURE — 3074F SYST BP LT 130 MM HG: CPT | Performed by: OTHER

## 2024-12-24 RX ORDER — CYCLOBENZAPRINE HCL 10 MG
10 TABLET ORAL NIGHTLY PRN
Qty: 30 TABLET | Refills: 2 | Status: SHIPPED | OUTPATIENT
Start: 2024-12-24

## 2024-12-24 NOTE — TELEPHONE ENCOUNTER
Please review. Protocol Failed; No Protocol      Recent fills: 8/19/2024, 10/1/2024  Last Rx written: 10/1/2024  Last office visit: 11/22/2024        Requested Prescriptions   Pending Prescriptions Disp Refills    ALPRAZolam 0.25 MG Oral Tab 30 tablet 0     Sig: Take 1 tablet (0.25 mg total) by mouth every 6 (six) hours as needed.       Controlled Substance Medication Failed - 12/24/2024  4:00 PM        Failed - This medication is a controlled substance - forward to provider to refill               Future Appointments         Provider Department Appt Notes    In 1 month Mel Go APRN [address] Weight loss    In 1 month Bang Chiang MD [facility]  Authorization # [id number]  Valid 12/24/24-6/23/25  [employer]          Recent Outpatient Visits              Today Chronic migraine w/o aura w/o status migrainosus, not intractable    [facility] Bang Chiang MD    Office Visit    4 weeks ago Iron deficiency anemia due to chronic blood loss    [facility] [place]    Office Visit    4 weeks ago Iron deficiency anemia due to chronic blood loss    [facility] [place] Tre Fagan MD    Office Visit    4 weeks ago Unrefreshed by sleep    [facility] [place]    Office Visit    1 month ago Tension headache    [address] Tuan Hughes DO    Office Visit

## 2024-12-24 NOTE — TELEPHONE ENCOUNTER
Insurance: [facility] Member ID# [id number]  Medication: Botox 200 units  Number of visits Approved: 2  (PLEASE INFORM THE PATIENT TO CONTACT THE OFFICE IF THE INSURANCE CHANGES WITHIN THE YEAR AS HE/SHE WILL BE RESPONSIBLE TO UPDATE THE OFFICE IF INSURANCE CHANGES SO THAT PROCEDURE IS BILLED CORRECTLY) this will be 1 of 2  Dx: G43.709  Last Botox done: n/a - New start  Next Botox due around: Now  Authorization # [id number]  Valid 12/24/24-6/23/25  [employer]    Fax has been sent for scanning

## 2024-12-24 NOTE — TELEPHONE ENCOUNTER
Patient called to schedule first Botox appointment. Patient has been scheduled for 02/11/25 at the [place] location. Patient is requesting a sooner appointment at any location

## 2024-12-24 NOTE — PROGRESS NOTES
HPI:    Patient ID: Karlie Dash is a 39 year old female.  PCP: Dr Hughes    Migraine          Patient is present today to follow up on migraines. Patient states her migraines her have been good however when she does get the migraines her medication eletriptan does not help anymore where she has to take 2 pills instead of 1. Patient states she having tension headaches primary in temporal areas, has TMJ and grind her teeth. States got Botox injections done for TMJ in the past and was effective.  Hypertension improved with weight loss and not taking Propranolol  Patient wants to know if there is any other medication she can take.   Current Medication: Eletriptan Hydrobromide 40 MG       Karlie is a 38-year-old female with history of hypertension who presented for evaluation of headaches. [facility] she has a longstanding history of migraines and 2019 the increase in severity, also neurology in [place] and was on preventive therapy. She then moved to [place] and got pregnant and the medicine was discontinued.  She she had gestational hypertension and preeclampsia and now on nifedipine and propranolol.  She states that headaches have increased gets about 1-2 per week, unilateral pain mostly on the left side associated with the blurry vision and light sensitivity. She is also being getting some tension headaches. [facility] has a stressful job, works in [employer] department.    HISTORY:  Past Medical History:    Essential hypertension    GERD    Hypertriglyceridemia    Migraines    Obesity      No past surgical history on file.   Family History   Problem Relation Age of Onset    Hypertension Mother     Depression Maternal Grandmother     Heart Disease Paternal Grandmother       Social History     Socioeconomic History    Marital status:     Number of children: 0   Occupational History    Occupation: hr asst   Tobacco Use    Smoking status: Former     Current packs/day: 0.00     Types: Cigarettes     Quit date: 2013     Years  since quittin.9     Passive exposure: Past    Smokeless tobacco: Former    Tobacco comments:     started     Vaping Use    Vaping status: Never Used   Substance and Sexual Activity    Alcohol use: Not Currently     Alcohol/week: 0.0 - 2.0 standard drinks of alcohol     Comment: 2 per week    Drug use: No    Sexual activity: Yes     Partners: Male   Other Topics Concern    Caffeine Concern Yes     Comment: 2 servings/day    Exercise No    Seat Belt Yes        Review of Systems   Constitutional: Negative.    HENT: Negative.     Eyes: Negative.    Respiratory: Negative.     Cardiovascular: Negative.    Gastrointestinal: Negative.    Endocrine: Negative.    Genitourinary: Negative.    Musculoskeletal: Negative.    Skin: Negative.    Allergic/Immunologic: Negative.    Neurological:  Positive for headaches.   Hematological: Negative.    Psychiatric/Behavioral: Negative.     All other systems reviewed and are negative.           Current Outpatient Medications   Medication Sig Dispense Refill    Eletriptan Hydrobromide 40 MG Oral Tab Take 1 tablet (40 mg total) by mouth as needed. 10 tablet 2    meclizine 25 MG Oral Tab Take 1 tablet (25 mg total) by mouth 3 (three) times daily as needed. 30 tablet 0    ALPRAZolam 0.25 MG Oral Tab Take 1 tablet (0.25 mg total) by mouth every 6 (six) hours as needed. 30 tablet 0    Tirzepatide-Weight Management (ZEPBOUND) 15 MG/0.5ML Subcutaneous Solution Auto-injector Inject 15 mg into the skin once a week. 2 mL 3    SLYND 4 MG Oral Tab Take 1 tablet by mouth daily.      Cholecalciferol (VITAMIN D) 25 MCG (1000 UT) Oral Tab Take by mouth As Directed.      Esomeprazole Magnesium 20 MG Oral Capsule Delayed Release Take 1 capsule (20 mg total) by mouth every morning before breakfast.       Allergies:No Known Allergies  PHYSICAL EXAM:   Physical Exam  Blood pressure 97/63, pulse 86, height 63\", weight 170 lb (77.1 kg), last menstrual period 10/09/2024, SpO2 97%, not currently  breastfeeding.  General Appearance: Well nourished, well developed, no apparent distress.   HEENT: Normocephalic and atraumatic. Normal sclera.   Cardiovascular: Normal rate, regular rhythm and normal heart sounds.    Pulmonary/Chest: Effort normal and breath sounds normal.   Abdominal: Soft. Bowel sounds are normal.   Skin: dry, clean and intact  Ext: peripheral pulses present  Psych: normal mood and affect    Neurological:  Patient is awake, alert and oriented to person, place and time   Normal memory, attention/concentration, speech and language.    Cranial Nerves:   II: Visual fields: normal  III: Pupils: equal, round, reactive to light  III,IV,VI: Extra Ocular Movements: intact  V: Facial sensation: intact  VII: Facial strength: intact  VIII: Hearing: intact  IX: Palate: intact  XI: Shoulder shrug: intact  XII: Tongue movement: normal    Motor: Normal tone. Strength is  5 out of 5 in all extremities bilaterally.  DTR: present    Sensory: Sensory examination is normal to light touch and pinprick     Coordination: Finger-to-nose test normal bilaterally without evidence of dysmetria.    Gait: normal casual gait     TESTS/IMAGING:     CT head:  Aug 2023  INDINGS:  VENTRICLES: No hydrocephalus.    EXTRA-AXIAL: No extraaxial hemorrhage.  No midline shift or herniation.    PARENCHYMA: No CT evidence of acute or subacute infarct.  No mass.  Parenchymal volume is normal.  Gray-white matter differentiation is maintained.    SOFT TISSUES: No acute abnormality.  BONES: No acute fracture.  SINUSES: Clear.  ORBITS: No acute abnormality.  MASTOIDS: Clear.  EACS: Clear.                  Impression   CONCLUSION: No acute intracranial abnormality.       ASSESSMENT/PLAN:         ICD-10-CM    1. Chronic migraine w/o aura w/o status migrainosus, not intractable  G43.709 Specialty Other Referral - External      2. TMJ dysfunction  M26.609 Specialty Other Referral - External              Patient is a 39-year-old female with history  of chronic migraines presenting for evaluation of increased headaches  Has TMJ dysfunction and associated muscle tension headaches    She was on Propranolol in the past but not taking anymore due to low normal BP  Other medications tried: Flexeril, ? Topamax    She will benefit from Botox injections    Resume Flexeril 5-10 mg as needed for TMJ pain  Continue Eletriptan as needed.     Bang Chiang MD  [facility] [place]    This note was prepared using Dragon Medical voice recognition dictation software. As a result errors may occur. When identified these errors have been corrected. While every attempt is made to correct errors during dictation discrepancies may still exist         Meds This Visit:  Requested Prescriptions      No prescriptions requested or ordered in this encounter       Imaging & Referrals:  None     ID#[id number]

## 2024-12-24 NOTE — TELEPHONE ENCOUNTER
Initiated authorization for Botox 200U. CPT/HCPCS , 65334 dx:G43.709 with Cici at [facility]    Clinicals faxed/uploaded to [phone]  Status: Pending  Case # [id number]

## 2024-12-25 RX ORDER — ALPRAZOLAM 0.25 MG
0.25 TABLET ORAL EVERY 6 HOURS PRN
Qty: 15 TABLET | Refills: 0 | Status: SHIPPED | OUTPATIENT
Start: 2024-12-25 | End: 2025-02-13

## 2024-12-31 ENCOUNTER — OFFICE VISIT (OUTPATIENT)
Dept: NEUROLOGY | Facility: CLINIC | Age: 39
End: 2024-12-31
Payer: COMMERCIAL

## 2024-12-31 DIAGNOSIS — G43.709 CHRONIC MIGRAINE W/O AURA W/O STATUS MIGRAINOSUS, NOT INTRACTABLE: Primary | ICD-10-CM

## 2024-12-31 PROCEDURE — 64615 CHEMODENERV MUSC MIGRAINE: CPT | Performed by: OTHER

## 2024-12-31 NOTE — PROCEDURES
Procedure: Botox injection -chemodenervation  Consent: obtained after explanation of procedure detail, benefits and risks     Indication:   -chronic migraine patient who suffers 15 or more days with headache lasting 4 hours a day or longer.     Procedure description:  -Chronic Migraine  Dilution is 100 units/2 ml with a final concentration of 5 units per 0.1 ml.  A sterile 30-gauge, 0.5 inch needle as 0.1 ml (5 units) injection per each site. Injections were divided across 7 specific head/neck muscle areas as specified in the table below. All muscles were injected bilaterally with half the number of injection sites administered to the left, and half to the right side of the head and neck. 35 units wasted.        Head/Neck Area Dose (number of sites)   Frontalis bilaterally 20 units divided in 4 sites    bilaterally 10 units divided in 2 sites   Procerus 5 unit in 1 site   Occipitalis bilaterally 30 units divided in 6 sites   Temporalis bilaterally 50 units divided in 8 sites   Trapezius bilaterally 30 units divided in 6 sites   Cervical Paraspinal bilaterally 20 units divided in 2 sites   Total Dose 165 units divided in 31 sites         The procedure was completed successfully without complication during and after the injections, and the patient tolerated well throughout the procedure.        The recommended re-treatment schedule should be ~12 weeks from today.

## 2025-01-19 DIAGNOSIS — E78.1 HYPERTRIGLYCERIDEMIA: ICD-10-CM

## 2025-01-19 DIAGNOSIS — Z51.81 ENCOUNTER FOR THERAPEUTIC DRUG MONITORING: ICD-10-CM

## 2025-01-19 DIAGNOSIS — E66.01 CLASS 3 SEVERE OBESITY WITH SERIOUS COMORBIDITY AND BODY MASS INDEX (BMI) OF 40.0 TO 44.9 IN ADULT, UNSPECIFIED OBESITY TYPE (HCC): ICD-10-CM

## 2025-01-19 DIAGNOSIS — R73.03 PREDIABETES: ICD-10-CM

## 2025-01-19 DIAGNOSIS — E66.813 CLASS 3 SEVERE OBESITY WITH SERIOUS COMORBIDITY AND BODY MASS INDEX (BMI) OF 40.0 TO 44.9 IN ADULT, UNSPECIFIED OBESITY TYPE (HCC): ICD-10-CM

## 2025-01-20 NOTE — TELEPHONE ENCOUNTER
Requesting zepbound 15  LOV: televisit 9/24/24 LOV 5/13/24  RTC: 4 months  Filled: 9/24/24 #2ml with 3 refills    Future Appointments   Date Time Provider Department Center   2/6/2025  9:00 AM Mel Go APRN [facility] [address]   4/8/2025  2:20 PM [facility]

## 2025-01-22 RX ORDER — TIRZEPATIDE 15 MG/.5ML
15 INJECTION, SOLUTION SUBCUTANEOUS WEEKLY
Qty: 2 ML | Refills: 0 | Status: SHIPPED | OUTPATIENT
Start: 2025-01-22

## 2025-01-23 DIAGNOSIS — E66.01 CLASS 3 SEVERE OBESITY WITH SERIOUS COMORBIDITY AND BODY MASS INDEX (BMI) OF 40.0 TO 44.9 IN ADULT, UNSPECIFIED OBESITY TYPE (HCC): ICD-10-CM

## 2025-01-23 DIAGNOSIS — Z51.81 ENCOUNTER FOR THERAPEUTIC DRUG MONITORING: ICD-10-CM

## 2025-01-23 DIAGNOSIS — E66.813 CLASS 3 SEVERE OBESITY WITH SERIOUS COMORBIDITY AND BODY MASS INDEX (BMI) OF 40.0 TO 44.9 IN ADULT, UNSPECIFIED OBESITY TYPE (HCC): ICD-10-CM

## 2025-01-23 DIAGNOSIS — R73.03 PREDIABETES: ICD-10-CM

## 2025-01-23 DIAGNOSIS — E78.1 HYPERTRIGLYCERIDEMIA: ICD-10-CM

## 2025-01-23 RX ORDER — TIRZEPATIDE 15 MG/.5ML
15 INJECTION, SOLUTION SUBCUTANEOUS WEEKLY
Qty: 2 ML | Refills: 0 | OUTPATIENT
Start: 2025-01-23

## 2025-01-27 ENCOUNTER — TELEPHONE (OUTPATIENT)
Dept: INTERNAL MEDICINE CLINIC | Facility: CLINIC | Age: 40
End: 2025-01-27

## 2025-01-27 ENCOUNTER — PATIENT MESSAGE (OUTPATIENT)
Dept: INTERNAL MEDICINE CLINIC | Facility: CLINIC | Age: 40
End: 2025-01-27

## 2025-01-27 NOTE — TELEPHONE ENCOUNTER
PT left a VM about Pa and having prescription send to [facility]'s pharmacy instead of [facility] club     Please advise.

## 2025-01-30 NOTE — TELEPHONE ENCOUNTER
Received a call from Representative La Grey in regards to patients Zepbound 15 mg prescription being denied     They will be faxing the denial to our office sometime today     [facility] has 180 days to appeal    callback #: [phone] opt 2    Future Appointments   Date Time Provider Department Center   2/6/2025  9:00 AM Mel Go APRN [facility] [address]   4/8/2025  2:20 PM Bang Chiang MD [facility]

## 2025-02-06 ENCOUNTER — OFFICE VISIT (OUTPATIENT)
Dept: INTERNAL MEDICINE CLINIC | Facility: CLINIC | Age: 40
End: 2025-02-06
Payer: COMMERCIAL

## 2025-02-06 VITALS
SYSTOLIC BLOOD PRESSURE: 118 MMHG | RESPIRATION RATE: 16 BRPM | DIASTOLIC BLOOD PRESSURE: 78 MMHG | BODY MASS INDEX: 29.57 KG/M2 | HEIGHT: 63.5 IN | HEART RATE: 92 BPM | WEIGHT: 169 LBS

## 2025-02-06 DIAGNOSIS — E66.813 CLASS 3 SEVERE OBESITY WITH SERIOUS COMORBIDITY AND BODY MASS INDEX (BMI) OF 40.0 TO 44.9 IN ADULT, UNSPECIFIED OBESITY TYPE (HCC): ICD-10-CM

## 2025-02-06 DIAGNOSIS — E66.01 CLASS 3 SEVERE OBESITY WITH SERIOUS COMORBIDITY AND BODY MASS INDEX (BMI) OF 40.0 TO 44.9 IN ADULT, UNSPECIFIED OBESITY TYPE (HCC): ICD-10-CM

## 2025-02-06 DIAGNOSIS — K21.9 GASTROESOPHAGEAL REFLUX DISEASE, UNSPECIFIED WHETHER ESOPHAGITIS PRESENT: ICD-10-CM

## 2025-02-06 DIAGNOSIS — Z51.81 ENCOUNTER FOR THERAPEUTIC DRUG MONITORING: Primary | ICD-10-CM

## 2025-02-06 DIAGNOSIS — E78.1 HYPERTRIGLYCERIDEMIA: ICD-10-CM

## 2025-02-06 DIAGNOSIS — R73.03 PREDIABETES: ICD-10-CM

## 2025-02-06 DIAGNOSIS — I10 HYPERTENSION, UNSPECIFIED TYPE: ICD-10-CM

## 2025-02-06 PROCEDURE — 3074F SYST BP LT 130 MM HG: CPT | Performed by: NURSE PRACTITIONER

## 2025-02-06 PROCEDURE — 99214 OFFICE O/P EST MOD 30 MIN: CPT | Performed by: NURSE PRACTITIONER

## 2025-02-06 PROCEDURE — 3008F BODY MASS INDEX DOCD: CPT | Performed by: NURSE PRACTITIONER

## 2025-02-06 PROCEDURE — 3078F DIAST BP <80 MM HG: CPT | Performed by: NURSE PRACTITIONER

## 2025-02-06 NOTE — PROGRESS NOTES
Karlie Dash is a 39 year old female presents today for follow-up on medical weight loss program for the treatment of overweight, obesity, or morbid obesity with associated prediabetes, hyperlipidemia, HTN.    S:  Current weight   Wt Readings from Last 6 Encounters:   02/06/25 169 lb (76.7 kg)   12/24/24 170 lb (77.1 kg)   11/26/24 175 lb (79.4 kg)   11/22/24 180 lb (81.6 kg)   11/15/24 178 lb 3.2 oz (80.8 kg)   10/01/24 185 lb 3.2 oz (84 kg)    AND BMI Body mass index is 29.47 kg/m²..    Patient has lost 37# since LOV 4 month ago via VV and in clinic in 5/2024. She was on Zepbound 15 mg weekly, but recent refill was denied. Patient bring form from insurance with her today requiring more information regarding hx of GI issues. Patient does not have gastroparesis or constipation. Hx of GERD that is triggered by certain foods and controlled on Omeprazole. Has been tolerating Zepbound well without SEs. Baseline BMI is 39.69.    Testing/consult completed since LOV: Dietician: no.  Labs:  yes, reviewed in EMR.    Labs: prediabetes, hyperlipidemia, sub optimal Vitamin D    [facility] Follow Up    Question 1/30/2025  9:37 AM CST - Filed by Patient   Please describe a success moment: being able to run 30 minutes straight   Please describe a challenging moment/needs for improvement: need to work on lifting weights   Please complete this 24 hour food journal, listing everything you had to eat in the past day. Include the average time of day you ate these meals at    List foods, qty and prep for breakfast: 2 hard boiled eggs, cheese slice and grapes   List foods, qty and prep for lunch. spaghetti with ground beef   List foods, qty and prep for dinner. stir olivia steak with zucchini and tomato   List foods, qty and prep for snacks. hummus and pretzels   List the types and qty of fluids consumed 80 oz of water, 20 oz of coffee   On average, how many meals did you eat out per week? 1   Exercise    How many days per  week are you active or exercise 4   On average, how many days were anaerobic (strength/resistance) exercises performed? 1   On average, how many days were aerobic (cardio) exercises performed? 3   Perceived level of exertion on a scale of 1-5, with 5 being very intense: 3   Stress    Average stress level on a scale of 1-10, with 10 being extremely stressed: 8   If greater than 5/1O how would you grade your coping mechanisms? moderate   Sleep hours and integrity    How many hours of uninterrupted sleep do you get a night: 5   Do you feel rested in the morning: No   If no, what may have been disrupting your sleep? toddler   Please list any goal(s) for your next visit lose another 30 pounds       Social hx and PMH reviewed. Employed from home.  with child.    REVIEW OF SYSTEMS:  GENERAL: feels well otherwise  LUNGS: denies shortness of breath with exertion  CARDIOVASCULAR: denies chest pain on exertion, denies palpitations or pedal edema  GI: denies abdominal pain.  No N/V/D/C, no heartburn reported- controlled on PPI therapy.  MUSCULOSKELETAL: no acute joint or muscle pain  NEURO: denies headaches, migraines stable  PSYCH: denies change in behavior or mood, denies feeling sad or depressed    EXAM:  /78   Pulse 92   Resp 16   Ht 5' 3.5\" (1.613 m)   Wt 169 lb (76.7 kg)   LMP 01/20/2025 (Approximate)   BMI 29.47 kg/m²   GENERAL: well developed, well nourished, in no apparent distress, overweight  EYES: conjunctiva pink, sclera non icteric, PERRLA  LUNGS: CTA in all fields, breathing non labored  CARDIO: RRR without murmur, normal S1 and S2 without clicks or gallops, no pedal edema.  GI: +BS, soft non tender, no masses or HSM  NEURO/MS: motor and sensory grossly intact  PSYCH: pleasant, cooperative, normal mood and affect    ASSESSMENT AND PLAN:  Reviewed Initial Weight Data and Goal Weight Loss:       Encounter Diagnoses   Name Primary?    Encounter for therapeutic drug monitoring Yes    Class 3 severe  obesity with serious comorbidity and body mass index (BMI) of 40.0 to 44.9 in adult, unspecified obesity type (HCC)     Hypertriglyceridemia     Hypertension, unspecified type     Prediabetes     Gastroesophageal reflux disease, unspecified whether esophagitis present          Orders Placed This Encounter   Procedures    Lipid Panel       Meds & Refills for this Visit:  Requested Prescriptions     Signed Prescriptions Disp Refills    Tirzepatide-Weight Management (ZEPBOUND) 15 MG/0.5ML Subcutaneous Solution Auto-injector 6 mL 1     Sig: Inject 15 mg into the skin once a week.       Imaging & Consults:  None      Plan:  Patient has lost 37# since LOV in 5/2024 on Zepbound 15 mg weekly with a total weight loss of 65# since initial consult on 2/6/2024 with initial weight of 234#. Weight loss goal: lose 15# in 6 months and 50# in 1 year. CPM. Recheck lipids. Discussed possibly adding fluoxetine for stress if needed in the future.  on self monitoring, protein recommendations, breaking weight plateaus and adding strength training. See patient instructions below for additional plans and patient counseling.      Patient Instructions   Continue making lifestyle changes that focus on good nutrition, regular exercise and stress management.    Medication Plan: Continue current medication regimen. Refill sent to [facility].    Next steps to work on before next office visit include: Keep up the great work! Add strength training with goal of 3x/week for 30-60 minutes. Protein recommendations listed below and continue regular self monitoring for success.      Start and/or continue tracking nutrition to remain accountable for both quality and quantity of food. Recommend setting weekly weight loss goal to 1.5-2# and caution adding your exercise, as the cynthia may overestimate your daily calories. Use a food scale, measuring cups and spoons. Purchase serving dishes that are 1/4 cup, 1/2 cup and 1 cup servings. Use an cynthia such as  MyNetDiary (www.[employer].com), MyFitnessPal, or LoseIT! to provide accountably, structure and support. Consider a consult or follow up with one of our dieticians to help modify and/or support your nutrition plan for weight loss as well as maintenance.      What are proteins?  Proteins are one of three primary macronutrients that provide energy to the human body, along with fats and carbohydrates. Proteins are also responsible for a large portion of the work that is done in cells; they are necessary for proper structure and function of tissues and organs, and also act to regulate them. They are comprised of a number of amino acids that are essential to proper body function, and serve as the building blocks of body tissue.  There are 20 different amino acids in total, and the sequence of amino acids determines a protein's structure and function. While some amino acids can be synthesized in the body, there are 9 amino acids that humans can only obtain from dietary sources (insufficient amounts of which may sometimes result in death), termed essential amino acids. Foods that provide all of the essential amino acids are called complete protein sources, and include both animal (meat, dairy, eggs, fish) as well as plant-based sources (soy, quinoa, buckwheat).    Proteins can be categorized based on the function they provide to the body. Below is a list of some types of proteins:  Antibody--proteins that protect the body from foreign particles, such as viruses and bacteria, by binding to them  Enzyme--proteins that help form new molecules as well as perform the many chemical reactions that occur throughout the body  Messenger--proteins that transmit signals throughout the body to maintain body processes  Structural component--proteins that act as building blocks for cells that ultimately allow the body to move  Transport/storage--proteins that move molecules throughout the body  As can be seen, proteins have many  important roles throughout the body, and as such, it is important to provide sufficient nutrition to the body to maintain healthy protein levels.    How much protein do I need?  The amount of protein that the human body requires daily is dependent on many conditions, including overall energy intake, growth of the individual, and physical activity level. It is often estimated based on body weight, as a percentage of total caloric intake (10-35%), or based on age alone. 0.8g/kg of body weight is a commonly cited recommended dietary allowance (RDA). This value is the minimum recommended value to maintain basic nutritional requirements, but consuming more protein, up to a certain point, maybe beneficial, depending on the sources of the protein.  The recommended range of protein intake is between 0.8 g/kg and 1.8 g/kg of body weight, dependent on the many factors listed above. People who are highly active, or who wish to build more muscle should generally consume more protein. Some sources suggest consuming between 1.8 to 2 g/kg for those who are highly active. The amount of protein a person should consume, to date, is not an exact science, and each individual should consult a specialist, be it a dietitian, doctor, or , to help determine their individual needs. I recommend your daily protein intake to be 90 grams/day.    Foods high in protein  There are many different combinations of food that a person can eat to meet their protein intake requirements. For many people, a large portion of protein intake comes from meat and dairy, though it is possible to get enough protein while meeting certain dietary restrictions you might have. Generally, it is easier to meet your RDA of protein by consuming meat and dairy, but an excess of either can have a negative health impact. There are plenty of plant-based protein options, but they generally contain less protein in a given serving. Ideally, a person should  consume a mixture of meat, dairy, and plant-based foods in order to meet their RDA and have a balanced diet replete with nutrients.    If possible, consuming a variety of complete proteins is recommended. A complete protein is a protein that contains a good amount of each of the nine essential amino acids required in the human diet. Examples of complete protein foods or meals include:    Meat/Dairy examples  Eggs  Chicken breast  Cottage cheese  Greek yogurt  Milk  Lean beef  Tuna  Turkey breast  Fish  Shrimp    Vegan/plant-based examples  Buckwheat  Hummus and tiffany  Soy products (tofu, tempeh, edamame beans)  Peanut butter on toast or some other bread  Beans and rice  Quinoa  Hemp and john seeds  Spirulina  Generally, meat, poultry, fish, eggs, and dairy products are complete protein sources. Nuts and seeds, legumes, grains, and vegetables, among other things, are usually incomplete proteins. There is nothing wrong with incomplete proteins however, and there are many healthy, high protein foods that are incomplete proteins. As long as you consume a sufficient variety of incomplete proteins to get all the required amino acids, it is not necessary to specifically eat complete protein foods. In fact, certain high fat red meats for example, a common source of complete proteins, can be unhealthy.   Below are some examples of high protein foods that are not complete proteins:  Almonds  Oats  Broccoli  Lentils  Red bread  John seeds  Pumpkin seeds  Peanuts  [place] sprouts  Grapefruit  Green peas  Avocados  Mushrooms  As can be seen, there are many different foods a person can consume to meet their RDA of protein. The examples provided above do not constitute an exhaustive list of high protein or complete protein foods. As with everything else, balance is important, and the examples provided above are an attempt at providing a list of healthier protein options (when consumed in moderation).    Amount of protein in  common food      Protein Amount  Milk (1 cup/8 oz)  8 g  Egg (1 large/50 g)  6 g  Meat (1 slice / 2 oz)  14 g  Seafood (2 oz)  16 g  Bread (1 slice/64 g)   8 g  Corn (1 cup/166 g)  16 g  Rice (1 cup/195 g)  5 g  Dry Bean (1 cup/92 g)   16 g  Nuts (1 cup/92 g)    20 g  Fruits and Veggie (1 cup)  1 g    Data above taken from www.calculator.net    The Power of Protein:    High Protein Foods:  FISH  (3-6 ounces/meal)  All types of fish  Seafood (shrimp, scallops, clams, mussels, lobster)  EGGS     2-3 eggs/meal  DAIRY (2/3 to 1 ½ cup)  Cottage cheese   Greek yogurt  PLANTS (½-3/4 cup/meal)  Legumes: Dried beans and peas (black beans, alfredo beans, garbanzo beans, kidney, cannellini, navy, split peas, black eyed peas)  Lentils  Quinoa  Soy (edamame, tofu)  PORK   (3-6 oz/meal)  Tenderloin  Pork chop  Top loin roast, boneless  Sirloin roast, boneless  Antelope whitfield (nitrate free)  Boiled deli ham (nitrate free)    Additional Protein Sources:  BEEF    (3-6 oz/meal)  Flank steak       Skirt steak  Bottom round(rump roast), select   Ground beef, 90% lean (ground sirloin)  Harley eye steak, choice  Eye of round roast, choice   POULTRY  (3-6 oz/meal)  Ground chicken or turkey  Chicken, no skin  Turkey, no skin  PROTEIN SHAKES: OWYN, Koia, and Rebbl (plant based and dairy free), Corepower by [employer] (plant based option available), Premier Protein, JuicePlus Complete (www.juiceplus.com)  PROTEIN BARS: RXBAR, PowerCrunch, Quest, Barebells, Mosh      Stopped Losing Weight? Fight Your Way through a Weight Plateau  March 12, 2019  Posted in Blog, Motivation  By Your Weight [employer] [facility]     You've managed your food intake, had your fair share of sweat sessions, and you're on a roll with weight-loss. Congrats! Confidence and determination have propelled you to success.  But all of a sudden, the scale stops moving and will no long budge. You haven't slacked off, so what's the deal? You might have found yourself in a  frustrating weight plateau.  What's a Weight Plateau?  Plateaus happen when you stop losing weight, even if you're doing everything “right” -- cutting back on portion sizes, eating healthy foods and working out like your life depends on it.  The sudden halt in progress might seem surprising if your initial weight-loss was fast and relatively simple. However, plateaus are common and sometimes inevitable if your metabolism has declined -- usually from muscle-loss and significant changes to your body weight. This means you're burning less calories than before, even if you're doing the same thing.  Tips to Fight back  Weight plateaus can be trying, especially if your motivation was fed by seeing progress on the scale. Still, you haven't done anything wrong. You just need a new game plan!  Are You Building Muscle?  Strength training builds muscle which boosts your metabolism and burns far more calories over time than cardio. Try the “progressive overload” approach of gradually increasing stressed placed on the body during weight training. This includes slowly adding weight and reps.  Adjust Your Calorie Intake  After weight-loss, you may be burning less calories if your metabolism has decreased. So, you might also need to consume less calories. Re-examine and adjust your food behavior.  Embrace Change in Your Routine  Try a new workout in a new location, preferably something invigorating that you've never done before. Also consider some new recipes to prevent meals from getting boring. Changes to your normal routine are fresh, exciting and usually very motivating.  Manage Stress  Stress can often put the brakes on weight-loss by triggering food cravings and releasing the hormone cortisol. Identify healthy and sustainable stress management techniques  to remain relaxed, focused and balanced in all aspects and areas of your body.  Eat More Protein  Protein burns more calories during digestion than other nutrients, and some  studies show it to have fat-burning catalysts. It also keeps you full and aids in building lean muscle mass.  Ensure You're Properly Hydrated  Even when you're mildly dehydrated, your body can crave food despite a true lack in hunger. You should strive for at least  fluid ounces of water each day (even more when you exercise) and replace coffee, tea and alcoholic beverages with water when you can.  Plateaus Can be Conquered  If you've still got weight to lose that will benefit your health, don't feel discouraged in the midst of a plateau. It happens, and it's a sign that you've already made great progress!  Instead, try thinking of the plateau as your next great challenge. When you re-work your mindset and get outside your comfort zone, you'll continue to surprise yourself.      Medication use and SEs reviewed with patient.    Return in about 4 months (around 6/6/2025) for weight management via Telemedicine Visit and in clinic in September.    Patient verbalizes understanding.    DOCUMENTATION OF TIME SPENT: Code selection for this visit was based on time spent : 30 minutes on date of service in preparing to see the patient, obtaining and/or reviewing separately obtained history, performing a medically appropriate examination, counseling and educating the patient/family/caregiver, ordering medications or testing, referring and communicating with other healthcare providers, documenting clinical information in the electronic medical record, independently interpreting results and communicating results to the patient/family/caregiver and care coordination with the patient's other providers.      Answers submitted by the patient for this visit:  Medical Weight Loss Follow Up (Submitted on 1/30/2025)  If greater than 5/1O how would you grade your coping mechanisms?: moderate

## 2025-02-06 NOTE — PATIENT INSTRUCTIONS
Continue making lifestyle changes that focus on good nutrition, regular exercise and stress management.    Medication Plan: Continue current medication regimen. Refill sent to [facility].    Next steps to work on before next office visit include: Keep up the great work! Add strength training with goal of 3x/week for 30-60 minutes. Protein recommendations listed below and continue regular self monitoring for success.      Start and/or continue tracking nutrition to remain accountable for both quality and quantity of food. Recommend setting weekly weight loss goal to 1.5-2# and caution adding your exercise, as the cynthia may overestimate your daily calories. Use a food scale, measuring cups and spoons. Purchase serving dishes that are 1/4 cup, 1/2 cup and 1 cup servings. Use an cynthia such as ASOCS (www.mynetdiary.com), ElationEMR, or [employer]! to provide accountably, structure and support. Consider a consult or follow up with one of our dieticians to help modify and/or support your nutrition plan for weight loss as well as maintenance.      What are proteins?  Proteins are one of three primary macronutrients that provide energy to the human body, along with fats and carbohydrates. Proteins are also responsible for a large portion of the work that is done in cells; they are necessary for proper structure and function of tissues and organs, and also act to regulate them. They are comprised of a number of amino acids that are essential to proper body function, and serve as the building blocks of body tissue.  There are 20 different amino acids in total, and the sequence of amino acids determines a protein's structure and function. While some amino acids can be synthesized in the body, there are 9 amino acids that humans can only obtain from dietary sources (insufficient amounts of which may sometimes result in death), termed essential amino acids. Foods that provide all of the essential amino acids are called complete  protein sources, and include both animal (meat, dairy, eggs, fish) as well as plant-based sources (soy, quinoa, buckwheat).    Proteins can be categorized based on the function they provide to the body. Below is a list of some types of proteins:  Antibody--proteins that protect the body from foreign particles, such as viruses and bacteria, by binding to them  Enzyme--proteins that help form new molecules as well as perform the many chemical reactions that occur throughout the body  Messenger--proteins that transmit signals throughout the body to maintain body processes  Structural component--proteins that act as building blocks for cells that ultimately allow the body to move  Transport/storage--proteins that move molecules throughout the body  As can be seen, proteins have many important roles throughout the body, and as such, it is important to provide sufficient nutrition to the body to maintain healthy protein levels.    How much protein do I need?  The amount of protein that the human body requires daily is dependent on many conditions, including overall energy intake, growth of the individual, and physical activity level. It is often estimated based on body weight, as a percentage of total caloric intake (10-35%), or based on age alone. 0.8g/kg of body weight is a commonly cited recommended dietary allowance (RDA). This value is the minimum recommended value to maintain basic nutritional requirements, but consuming more protein, up to a certain point, maybe beneficial, depending on the sources of the protein.  The recommended range of protein intake is between 0.8 g/kg and 1.8 g/kg of body weight, dependent on the many factors listed above. People who are highly active, or who wish to build more muscle should generally consume more protein. Some sources suggest consuming between 1.8 to 2 g/kg for those who are highly active. The amount of protein a person should consume, to date, is not an exact science, and  each individual should consult a specialist, be it a dietitian, doctor, or , to help determine their individual needs. I recommend your daily protein intake to be 90 grams/day.    Foods high in protein  There are many different combinations of food that a person can eat to meet their protein intake requirements. For many people, a large portion of protein intake comes from meat and dairy, though it is possible to get enough protein while meeting certain dietary restrictions you might have. Generally, it is easier to meet your RDA of protein by consuming meat and dairy, but an excess of either can have a negative health impact. There are plenty of plant-based protein options, but they generally contain less protein in a given serving. Ideally, a person should consume a mixture of meat, dairy, and plant-based foods in order to meet their RDA and have a balanced diet replete with nutrients.    If possible, consuming a variety of complete proteins is recommended. A complete protein is a protein that contains a good amount of each of the nine essential amino acids required in the human diet. Examples of complete protein foods or meals include:    Meat/Dairy examples  Eggs  Chicken breast  Cottage cheese  Greek yogurt  Milk  Lean beef  Tuna  Turkey breast  Fish  Shrimp    Vegan/plant-based examples  Buckwheat  Hummus and tiffany  Soy products (tofu, tempeh, edamame beans)  Peanut butter on toast or some other bread  Beans and rice  Quinoa  Hemp and suzanne seeds  Spirulina  Generally, meat, poultry, fish, eggs, and dairy products are complete protein sources. Nuts and seeds, legumes, grains, and vegetables, among other things, are usually incomplete proteins. There is nothing wrong with incomplete proteins however, and there are many healthy, high protein foods that are incomplete proteins. As long as you consume a sufficient variety of incomplete proteins to get all the required amino acids, it is not necessary  to specifically eat complete protein foods. In fact, certain high fat red meats for example, a common source of complete proteins, can be unhealthy.   Below are some examples of high protein foods that are not complete proteins:  Almonds  Oats  Broccoli  Lentils  Red bread  John seeds  Pumpkin seeds  Peanuts  [place] sprouts  Grapefruit  Green peas  Avocados  Mushrooms  As can be seen, there are many different foods a person can consume to meet their RDA of protein. The examples provided above do not constitute an exhaustive list of high protein or complete protein foods. As with everything else, balance is important, and the examples provided above are an attempt at providing a list of healthier protein options (when consumed in moderation).    Amount of protein in common food      Protein Amount  Milk (1 cup/8 oz)  8 g  Egg (1 large/50 g)  6 g  Meat (1 slice / 2 oz)  14 g  Seafood (2 oz)  16 g  Bread (1 slice/64 g)   8 g  Corn (1 cup/166 g)  16 g  Rice (1 cup/195 g)  5 g  Dry Bean (1 cup/92 g)   16 g  Nuts (1 cup/92 g)    20 g  Fruits and Veggie (1 cup)  1 g    Data above taken from www.calculator.net    The Power of Protein:    High Protein Foods:  FISH  (3-6 ounces/meal)  All types of fish  Seafood (shrimp, scallops, clams, mussels, lobster)  EGGS     2-3 eggs/meal  DAIRY (2/3 to 1 ½ cup)  Cottage cheese   Greek yogurt  PLANTS (½-3/4 cup/meal)  Legumes: Dried beans and peas (black beans, alfredo beans, garbanzo beans, kidney, cannellini, navy, split peas, black eyed peas)  Lentils  Quinoa  Soy (edamame, tofu)  PORK   (3-6 oz/meal)  Tenderloin  Pork chop  Top loin roast, boneless  Sirloin roast, boneless  Djiboutian whitfield (nitrate free)  Boiled deli ham (nitrate free)    Additional Protein Sources:  BEEF    (3-6 oz/meal)  Flank steak       Skirt steak  Bottom round(rump roast), select   Ground beef, 90% lean (ground sirloin)  Harley eye steak, choice  Eye of round roast, choice   POULTRY  (3-6 oz/meal)  Ground  chicken or turkey  Chicken, no skin  Turkey, no skin  PROTEIN SHAKES: ELVIRA, Leonid, and Rebbl (plant based and dairy free), Corepower by GlassesOff, Avinash (plant based option available), Premier Protein, JuicePlus Complete (www.Ul[employer] Devices.com)  PROTEIN BARS: RXBAR, PowerCrunch, Quest, Barebells, Mosh      Stopped Losing Weight? Fight Your Way through a Weight Plateau  March 12, 2019  Posted in Blog, Motivation  By Your Weight Matters Campaign     You've managed your food intake, had your fair share of sweat sessions, and you're on a roll with weight-loss. Congrats! Confidence and determination have propelled you to success.  But all of a sudden, the scale stops moving and will no long budge. You haven't slacked off, so what's the deal? You might have found yourself in a frustrating weight plateau.  What's a Weight Plateau?  Plateaus happen when you stop losing weight, even if you're doing everything “right” -- cutting back on portion sizes, eating healthy foods and working out like your life depends on it.  The sudden halt in progress might seem surprising if your initial weight-loss was fast and relatively simple. However, plateaus are common and sometimes inevitable if your metabolism has declined -- usually from muscle-loss and significant changes to your body weight. This means you're burning less calories than before, even if you're doing the same thing.  Tips to Fight back  Weight plateaus can be trying, especially if your motivation was fed by seeing progress on the scale. Still, you haven't done anything wrong. You just need a new game plan!  Are You Building Muscle?  Strength training builds muscle which boosts your metabolism and burns far more calories over time than cardio. Try the “progressive overload” approach of gradually increasing stressed placed on the body during weight training. This includes slowly adding weight and reps.  Adjust Your Calorie Intake  After weight-loss, you may be burning less  calories if your metabolism has decreased. So, you might also need to consume less calories. Re-examine and adjust your food behavior.  Embrace Change in Your Routine  Try a new workout in a new location, preferably something invigorating that you've never done before. Also consider some new recipes to prevent meals from getting boring. Changes to your normal routine are fresh, exciting and usually very motivating.  Manage Stress  Stress can often put the brakes on weight-loss by triggering food cravings and releasing the hormone cortisol. Identify healthy and sustainable stress management techniques  to remain relaxed, focused and balanced in all aspects and areas of your body.  Eat More Protein  Protein burns more calories during digestion than other nutrients, and some studies show it to have fat-burning catalysts. It also keeps you full and aids in building lean muscle mass.  Ensure You're Properly Hydrated  Even when you're mildly dehydrated, your body can crave food despite a true lack in hunger. You should strive for at least  fluid ounces of water each day (even more when you exercise) and replace coffee, tea and alcoholic beverages with water when you can.  Plateaus Can be Conquered  If you've still got weight to lose that will benefit your health, don't feel discouraged in the midst of a plateau. It happens, and it's a sign that you've already made great progress!  Instead, try thinking of the plateau as your next great challenge. When you re-work your mindset and get outside your comfort zone, you'll continue to surprise yourself.

## 2025-02-07 PROBLEM — Z51.81 ENCOUNTER FOR THERAPEUTIC DRUG MONITORING: Status: ACTIVE | Noted: 2025-02-07

## 2025-02-07 RX ORDER — TIRZEPATIDE 15 MG/.5ML
15 INJECTION, SOLUTION SUBCUTANEOUS WEEKLY
Qty: 6 ML | Refills: 1 | Status: SHIPPED | OUTPATIENT
Start: 2025-02-07

## 2025-02-13 ENCOUNTER — TELEPHONE (OUTPATIENT)
Dept: INTERNAL MEDICINE CLINIC | Facility: CLINIC | Age: 40
End: 2025-02-13

## 2025-02-13 DIAGNOSIS — F32.A ANXIETY AND DEPRESSION: ICD-10-CM

## 2025-02-13 DIAGNOSIS — F41.9 ANXIETY AND DEPRESSION: ICD-10-CM

## 2025-02-14 ENCOUNTER — LAB ENCOUNTER (OUTPATIENT)
Dept: LAB | Age: 40
End: 2025-02-14
Attending: INTERNAL MEDICINE
Payer: COMMERCIAL

## 2025-02-14 ENCOUNTER — LAB ENCOUNTER (OUTPATIENT)
Dept: LAB | Age: 40
End: 2025-02-14
Attending: NURSE PRACTITIONER
Payer: COMMERCIAL

## 2025-02-14 DIAGNOSIS — D50.0 IRON DEFICIENCY ANEMIA DUE TO CHRONIC BLOOD LOSS: ICD-10-CM

## 2025-02-14 DIAGNOSIS — Z51.81 ENCOUNTER FOR THERAPEUTIC DRUG MONITORING: ICD-10-CM

## 2025-02-14 DIAGNOSIS — E78.1 HYPERTRIGLYCERIDEMIA: ICD-10-CM

## 2025-02-14 DIAGNOSIS — E66.813 CLASS 3 SEVERE OBESITY WITH SERIOUS COMORBIDITY AND BODY MASS INDEX (BMI) OF 40.0 TO 44.9 IN ADULT, UNSPECIFIED OBESITY TYPE (HCC): ICD-10-CM

## 2025-02-14 DIAGNOSIS — E66.01 CLASS 3 SEVERE OBESITY WITH SERIOUS COMORBIDITY AND BODY MASS INDEX (BMI) OF 40.0 TO 44.9 IN ADULT, UNSPECIFIED OBESITY TYPE (HCC): ICD-10-CM

## 2025-02-14 DIAGNOSIS — I10 HYPERTENSION, UNSPECIFIED TYPE: ICD-10-CM

## 2025-02-14 LAB
BASOPHILS # BLD AUTO: 0.05 X10(3) UL (ref 0–0.2)
BASOPHILS NFR BLD AUTO: 0.8 %
CHOLEST SERPL-MCNC: 187 MG/DL (ref ?–200)
DEPRECATED HBV CORE AB SER IA-ACNC: 15 NG/ML
EOSINOPHIL # BLD AUTO: 0.08 X10(3) UL (ref 0–0.7)
EOSINOPHIL NFR BLD AUTO: 1.3 %
ERYTHROCYTE [DISTWIDTH] IN BLOOD BY AUTOMATED COUNT: 13.5 %
FASTING PATIENT LIPID ANSWER: YES
HCT VFR BLD AUTO: 42.1 %
HDLC SERPL-MCNC: 39 MG/DL (ref 40–59)
HGB BLD-MCNC: 14.7 G/DL
IMM GRANULOCYTES # BLD AUTO: 0.01 X10(3) UL (ref 0–1)
IMM GRANULOCYTES NFR BLD: 0.2 %
IRON SATN MFR SERPL: 25 %
IRON SERPL-MCNC: 81 UG/DL
LDLC SERPL CALC-MCNC: 118 MG/DL (ref ?–100)
LYMPHOCYTES # BLD AUTO: 1.97 X10(3) UL (ref 1–4)
LYMPHOCYTES NFR BLD AUTO: 32.8 %
MCH RBC QN AUTO: 30.8 PG (ref 26–34)
MCHC RBC AUTO-ENTMCNC: 34.9 G/DL (ref 31–37)
MCV RBC AUTO: 88.1 FL
MONOCYTES # BLD AUTO: 0.4 X10(3) UL (ref 0.1–1)
MONOCYTES NFR BLD AUTO: 6.7 %
NEUTROPHILS # BLD AUTO: 3.49 X10 (3) UL (ref 1.5–7.7)
NEUTROPHILS # BLD AUTO: 3.49 X10(3) UL (ref 1.5–7.7)
NEUTROPHILS NFR BLD AUTO: 58.2 %
NONHDLC SERPL-MCNC: 148 MG/DL (ref ?–130)
PLATELET # BLD AUTO: 296 10(3)UL (ref 150–450)
RBC # BLD AUTO: 4.78 X10(6)UL
TOTAL IRON BINDING CAPACITY: 319 UG/DL (ref 250–425)
TRANSFERRIN SERPL-MCNC: 247 MG/DL (ref 250–380)
TRIGL SERPL-MCNC: 168 MG/DL (ref 30–149)
VLDLC SERPL CALC-MCNC: 29 MG/DL (ref 0–30)
WBC # BLD AUTO: 6 X10(3) UL (ref 4–11)

## 2025-02-14 PROCEDURE — 85025 COMPLETE CBC W/AUTO DIFF WBC: CPT

## 2025-02-14 PROCEDURE — 36415 COLL VENOUS BLD VENIPUNCTURE: CPT

## 2025-02-14 PROCEDURE — 80061 LIPID PANEL: CPT

## 2025-02-14 PROCEDURE — 82728 ASSAY OF FERRITIN: CPT

## 2025-02-14 PROCEDURE — 83550 IRON BINDING TEST: CPT

## 2025-02-14 PROCEDURE — 83540 ASSAY OF IRON: CPT

## 2025-02-17 ENCOUNTER — TELEPHONE (OUTPATIENT)
Dept: PHYSICAL MEDICINE AND REHAB | Facility: CLINIC | Age: 40
End: 2025-02-17

## 2025-02-17 RX ORDER — ALPRAZOLAM 0.25 MG
0.25 TABLET ORAL DAILY PRN
Qty: 30 TABLET | Refills: 0 | Status: SHIPPED | OUTPATIENT
Start: 2025-02-17

## 2025-02-17 NOTE — TELEPHONE ENCOUNTER
No refill protocol. Please review pended order.     Recent fills each # 15 : 12/26/2024  Last prescription written: 12/25/2024  Last office visit:  11/22/2024    Future Appointments   Date Time Provider Department Center   4/8/2025  2:20 PM Bang Chiang MD [facility] [place] [facility]   6/10/2025  1:20 PM Mel Go APRN [facility]      Requested Prescriptions     Pending Prescriptions Disp Refills    ALPRAZolam 0.25 MG Oral Tab 15 tablet 0     Sig: Take 1 tablet (0.25 mg total) by mouth every 6 (six) hours as needed.

## 2025-02-17 NOTE — TELEPHONE ENCOUNTER
Initiated authorization for Botox 200U. CPT/[facility] , 46693 dx:G43.709 with Nelly LOPEZ with [employer] and the patient has an insurance for [employer].    I contacted [employer] and spoke with Jen and was informed the patients insurance is handled by an off [employer] company.    I then spoke with Peyton DURHAM and was then informed I needed to contact [facility].    I contacted [facility] and spoke with Jen JARAMILLO and the previous auth is still active. The patients insurance is the same, her Member ID changed.    Insurance: [employer] Member ID# [id number]  Medication: Botox 200 units  Number of visits Approved: 2  (PLEASE INFORM THE PATIENT TO CONTACT THE OFFICE IF THE INSURANCE CHANGES WITHIN THE YEAR AS HE/SHE WILL BE RESPONSIBLE TO UPDATE THE OFFICE IF INSURANCE CHANGES SO THAT PROCEDURE IS BILLED CORRECTLY) this will be 2 of 2  Dx: G43.709  Last Botox done: 12/31/24  Next Botox due around: 4/8/25 - Scheduled  Authorization # [id number]  Valid 12/24/24-6/23/25  [employer]  Authorization is not a guarantee of payment and may be subject to review once claim is submitted.

## 2025-02-27 ENCOUNTER — APPOINTMENT (OUTPATIENT)
Age: 40
End: 2025-02-27
Attending: INTERNAL MEDICINE
Payer: COMMERCIAL

## 2025-02-28 ENCOUNTER — OFFICE VISIT (OUTPATIENT)
Age: 40
End: 2025-02-28
Attending: INTERNAL MEDICINE
Payer: COMMERCIAL

## 2025-02-28 VITALS
DIASTOLIC BLOOD PRESSURE: 78 MMHG | OXYGEN SATURATION: 99 % | SYSTOLIC BLOOD PRESSURE: 116 MMHG | RESPIRATION RATE: 16 BRPM | HEART RATE: 82 BPM | TEMPERATURE: 97 F

## 2025-02-28 DIAGNOSIS — D50.0 IRON DEFICIENCY ANEMIA DUE TO CHRONIC BLOOD LOSS: Primary | ICD-10-CM

## 2025-03-13 DIAGNOSIS — E66.813 CLASS 3 SEVERE OBESITY WITH SERIOUS COMORBIDITY AND BODY MASS INDEX (BMI) OF 40.0 TO 44.9 IN ADULT, UNSPECIFIED OBESITY TYPE (HCC): ICD-10-CM

## 2025-03-13 DIAGNOSIS — E66.01 CLASS 3 SEVERE OBESITY WITH SERIOUS COMORBIDITY AND BODY MASS INDEX (BMI) OF 40.0 TO 44.9 IN ADULT, UNSPECIFIED OBESITY TYPE (HCC): ICD-10-CM

## 2025-03-13 DIAGNOSIS — Z51.81 ENCOUNTER FOR THERAPEUTIC DRUG MONITORING: ICD-10-CM

## 2025-03-13 DIAGNOSIS — R73.03 PREDIABETES: ICD-10-CM

## 2025-03-13 DIAGNOSIS — E78.1 HYPERTRIGLYCERIDEMIA: ICD-10-CM

## 2025-03-14 RX ORDER — TIRZEPATIDE 15 MG/.5ML
15 INJECTION, SOLUTION SUBCUTANEOUS
Qty: 2 ML | Refills: 0 | OUTPATIENT
Start: 2025-03-14

## 2025-03-26 ENCOUNTER — TELEPHONE (OUTPATIENT)
Dept: NEUROLOGY | Facility: CLINIC | Age: 40
End: 2025-03-26

## 2025-03-26 NOTE — TELEPHONE ENCOUNTER
Pt called in advising she cannot make botox appt 4/1/25 with Dr Chiang. Currently r/s to May 27th. Requesting if possible for her to get botox sooner. Pls advise.

## 2025-04-02 NOTE — TELEPHONE ENCOUNTER
[facility]   04/15 @ 9:40 AM   At [facility] with dr hernandez

## 2025-04-05 ENCOUNTER — PATIENT MESSAGE (OUTPATIENT)
Dept: NEUROLOGY | Facility: CLINIC | Age: 40
End: 2025-04-05

## 2025-04-14 ENCOUNTER — TELEPHONE (OUTPATIENT)
Dept: PHYSICAL MEDICINE AND REHAB | Facility: CLINIC | Age: 40
End: 2025-04-14

## 2025-04-14 NOTE — TELEPHONE ENCOUNTER
I spoke with with Dr. Chiang and she will proceed with a nerve block if the patient states her migraine is sever.    Nerve block CPT codes: 37433,   Status: No action required  Reference/Authorization # [id number]  Valid: 4/14/25-6/13/25  Authorization is not required based on medical necessity, however, is not a guarantee of payment and may be subject to review once claim is submitted.

## 2025-04-14 NOTE — TELEPHONE ENCOUNTER
LVM for pt. Have yet to received approval for botox per authorization team approval may take up to 4-5 days. Advised of 4/15 cancellation as we dont have approval due to recent insurance change.

## 2025-04-14 NOTE — TELEPHONE ENCOUNTER
Initiated authorization for Botox 200 units. CPT/[facility] , 74241 dx:G42.927 with [employer].    Clinicals faxed/uploaded to [employer] portal.  Status: Pending  Case # [id number]

## 2025-04-15 ENCOUNTER — OFFICE VISIT (OUTPATIENT)
Dept: NEUROLOGY | Facility: CLINIC | Age: 40
End: 2025-04-15
Payer: COMMERCIAL

## 2025-04-15 VITALS — SYSTOLIC BLOOD PRESSURE: 109 MMHG | OXYGEN SATURATION: 100 % | HEART RATE: 89 BPM | DIASTOLIC BLOOD PRESSURE: 74 MMHG

## 2025-04-15 DIAGNOSIS — M54.81 BILATERAL OCCIPITAL NEURALGIA: ICD-10-CM

## 2025-04-15 DIAGNOSIS — G43.709 CHRONIC MIGRAINE W/O AURA W/O STATUS MIGRAINOSUS, NOT INTRACTABLE: Primary | ICD-10-CM

## 2025-04-15 RX ORDER — TRIAMCINOLONE ACETONIDE 40 MG/ML
80 INJECTION, SUSPENSION INTRA-ARTICULAR; INTRAMUSCULAR ONCE
Status: COMPLETED | OUTPATIENT
Start: 2025-04-15 | End: 2025-04-15

## 2025-04-15 RX ORDER — LIDOCAINE HYDROCHLORIDE 10 MG/ML
4 INJECTION, SOLUTION INFILTRATION; PERINEURAL ONCE
Status: COMPLETED | OUTPATIENT
Start: 2025-04-15 | End: 2025-04-15

## 2025-04-15 RX ORDER — LIDOCAINE HYDROCHLORIDE 10 MG/ML
4 INJECTION, SOLUTION EPIDURAL; INFILTRATION; INTRACAUDAL; PERINEURAL ONCE
Status: DISCONTINUED | OUTPATIENT
Start: 2025-04-15 | End: 2025-04-15

## 2025-04-15 RX ADMIN — LIDOCAINE HYDROCHLORIDE 4 ML: 10 INJECTION, SOLUTION INFILTRATION; PERINEURAL at 10:35:00

## 2025-04-15 NOTE — PROGRESS NOTES
HPI:    Patient ID: Karlie Dash is a 39 year old female.  PCP: Dr Saul Yin           Karlie Dash is a 39-year-old female who presented with complaints of bilateral occipital headache/neuralgia pain.  She reports that she was due for Botox however due to change of insurance she needs prior authorization for Botox.  States she  took muscle relaxer but continued to have severe pain in the occipital area, more on the left than the right         Patient is present today to follow up on migraines. Patient states her migraines her have been good however when she does get the migraines her medication eletriptan does not help anymore where she has to take 2 pills instead of 1. Patient states she having tension headaches primary in temporal areas, has TMJ and grind her teeth. States got Botox injections done for TMJ in the past and was effective.  Hypertension improved with weight loss and not taking Propranolol  Patient wants to know if there is any other medication she can take.   Current Medication: Eletriptan Hydrobromide 40 MG       Karlie is a 38-year-old female with history of hypertension who presented for evaluation of headaches. States she has a longstanding history of migraines and 2019 the increase in severity, also neurology in [place] and was on preventive therapy. She then moved to [place] and got pregnant and the medicine was discontinued.  She she had gestational hypertension and preeclampsia and now on nifedipine and propranolol.  She states that headaches have increased gets about 1-2 per week, unilateral pain mostly on the left side associated with the blurry vision and light sensitivity. She is also being getting some tension headaches. [employer] has a stressful job, works in [employer] department.    HISTORY:  Past Medical History:    Essential hypertension    GERD    Hypertriglyceridemia    Migraines    Obesity      No past surgical history on file.   Family History   Problem Relation Age of Onset     Hypertension Mother     Depression Maternal Grandmother     Heart Disease Paternal Grandmother       Social History     Socioeconomic History    Marital status:     Number of children: 0   Occupational History    Occupation: hr asst   Tobacco Use    Smoking status: Former     Current packs/day: 0.00     Types: Cigarettes     Quit date:      Years since quittin.2     Passive exposure: Past    Smokeless tobacco: Former    Tobacco comments:     started     Vaping Use    Vaping status: Never Used   Substance and Sexual Activity    Alcohol use: Not Currently     Alcohol/week: 0.0 - 2.0 standard drinks of alcohol     Comment: 2 per week    Drug use: No    Sexual activity: Yes     Partners: Male   Other Topics Concern    Caffeine Concern Yes     Comment: 2 servings/day    Exercise No    Seat Belt Yes        Review of Systems   Constitutional: Negative.    HENT: Negative.     Eyes: Negative.    Respiratory: Negative.     Cardiovascular: Negative.    Gastrointestinal: Negative.    Endocrine: Negative.    Genitourinary: Negative.    Musculoskeletal: Negative.    Skin: Negative.    Allergic/Immunologic: Negative.    Neurological:  Positive for headaches.   Hematological: Negative.    Psychiatric/Behavioral: Negative.     All other systems reviewed and are negative.           Current Outpatient Medications   Medication Sig Dispense Refill    ALPRAZolam 0.25 MG Oral Tab Take 1 tablet (0.25 mg total) by mouth daily as needed. 30 tablet 0    Tirzepatide-Weight Management (ZEPBOUND) 15 MG/0.5ML Subcutaneous Solution Auto-injector Inject 15 mg into the skin once a week. 6 mL 1    cyclobenzaprine 10 MG Oral Tab Take 1 tablet (10 mg total) by mouth nightly as needed for Muscle spasms. 30 tablet 2    Eletriptan Hydrobromide 40 MG Oral Tab Take 1 tablet (40 mg total) by mouth as needed. 10 tablet 2    SLYND 4 MG Oral Tab Take 1 tablet by mouth daily.      Cholecalciferol (VITAMIN D) 25 MCG (1000 UT) Oral Tab Take  by mouth As Directed.      Esomeprazole Magnesium 20 MG Oral Capsule Delayed Release Take 1 capsule (20 mg total) by mouth every morning before breakfast.       Allergies:Not on File  PHYSICAL EXAM:   Physical Exam  Blood pressure 109/74, pulse 89, last menstrual period 01/20/2025, SpO2 100%, not currently breastfeeding.  General Appearance: Well nourished, well developed, no apparent distress.   HEENT: Normocephalic and atraumatic. Normal sclera.   Cardiovascular: Normal rate, regular rhythm and normal heart sounds.    Pulmonary/Chest: Effort normal and breath sounds normal.   Abdominal: Soft. Bowel sounds are normal.   Skin: dry, clean and intact  Ext: peripheral pulses present  Psych: normal mood and affect    Neurological:  Patient is awake, alert and oriented to person, place and time   Normal memory, attention/concentration, speech and language.    Cranial Nerves:   II: Visual fields: normal  III: Pupils: equal, round, reactive to light  III,IV,VI: Extra Ocular Movements: intact  V: Facial sensation: intact  VII: Facial strength: intact  VIII: Hearing: intact  IX: Palate: intact  XI: Shoulder shrug: intact  XII: Tongue movement: normal    Motor: Normal tone. Strength is  5 out of 5 in all extremities bilaterally.  DTR: present    Sensory: Sensory examination is normal to light touch and pinprick     Coordination: Finger-to-nose test normal bilaterally without evidence of dysmetria.    Gait: normal casual gait     TESTS/IMAGING:     CT head:  Aug 2023  INDINGS:  VENTRICLES: No hydrocephalus.    EXTRA-AXIAL: No extraaxial hemorrhage.  No midline shift or herniation.    PARENCHYMA: No CT evidence of acute or subacute infarct.  No mass.  Parenchymal volume is normal.  Gray-white matter differentiation is maintained.    SOFT TISSUES: No acute abnormality.  BONES: No acute fracture.  SINUSES: Clear.  ORBITS: No acute abnormality.  MASTOIDS: Clear.  EACS: Clear.                  Impression   CONCLUSION: No acute  intracranial abnormality.       ASSESSMENT/PLAN:       ICD-10-CM    1. Chronic migraine w/o aura w/o status migrainosus, not intractable  G43.709 triamcinolone acetonide (Kenalog-40) 40 MG/ML injection 80 mg     lidocaine (Xylocaine) 1 % injection     DISCONTINUED: lidocaine PF (Xylocaine-MPF) 1% injection      2. Bilateral occipital neuralgia  M54.81             Patient is a 39-year-old female with history of chronic migraines presenting with a bilateral greater occipital neuralgia pain    On Botox therapy, last injection in Dec 2024  Due to change in insurance needs a new PA    Recommended bilateral occipital nerve block for intractable headache/ neuralgia pain    Continue Flexeril 5-10 mg as needed for TMJ pain  Continue Eletriptan as needed.     Bang Chiang MD  [facility] [place]    This note was prepared using Dragon Medical voice recognition dictation software. As a result errors may occur. When identified these errors have been corrected. While every attempt is made to correct errors during dictation discrepancies may still exist         Meds This Visit:  Requested Prescriptions      No prescriptions requested or ordered in this encounter       Imaging & Referrals:  None     ID#[id number]

## 2025-04-15 NOTE — PROCEDURES
Indication:  Bilateral occipital neuralgia   Consents obtain after discussing risk vs benefits    Procedure: The areas are prepped and cleaned with betadine scrub and alcohol.  Bilateral occipital nerve block performed using a mixture of 4 ml 0.5% Marcaine and 2  ml Johtbnd62 mg/ml. The procedure was completed successfully without complication during and after the injections, and the patient tolerated well throughout the procedure.     Patient is advised to use ice packs at the sites to reduce post procedure soreness.  In case of swallowing and breathing difficulties or enlarged hematoma in head; patient is advised to go emergency room immediately. Call us office for any further assistance.  Patient verbalized understanding.

## 2025-04-16 ENCOUNTER — MED REC SCAN ONLY (OUTPATIENT)
Dept: NEUROLOGY | Facility: CLINIC | Age: 40
End: 2025-04-16

## 2025-04-18 RX ORDER — CYCLOBENZAPRINE HCL 10 MG
10 TABLET ORAL NIGHTLY PRN
Qty: 30 TABLET | Refills: 0 | Status: SHIPPED | OUTPATIENT
Start: 2025-04-18

## 2025-04-18 NOTE — TELEPHONE ENCOUNTER
Medication: cyclobenzaprine 10 MG Oral Tab      Date of last refill: 12/24/2024 (#30/2)  [place]14/[place]25     Last office visit: 04/15/2025  Due back to clinic per last office note:    Date next office visit scheduled:    Future Appointments   Date Time Provider Department Center   5/23/2025  1:00 PM Brenda Holcomb MD [facility] [place]   5/27/2025 10:00 AM Bang Chiang MD [facility]   6/10/2025  1:20 PM Mel Go APRN [facility]           Last OV note recommendation:    ASSESSMENT/PLAN:          ICD-10-CM     1. Chronic migraine w/o aura w/o status migrainosus, not intractable  G43.709 triamcinolone acetonide (Kenalog-40) 40 MG/ML injection 80 mg       lidocaine (Xylocaine) 1 % injection       DISCONTINUED: lidocaine PF (Xylocaine-MPF) 1% injection       2. Bilateral occipital neuralgia  M54.81                  Patient is a 39-year-old female with history of chronic migraines presenting with a bilateral greater occipital neuralgia pain     On Botox therapy, last injection in Dec 2024  Due to change in insurance needs a new PA     Recommended bilateral occipital nerve block for intractable headache/ neuralgia pain     Continue Flexeril 5-10 mg as needed for TMJ pain  Continue Eletriptan as needed.

## 2025-05-06 ENCOUNTER — HOSPITAL ENCOUNTER (OUTPATIENT)
Age: 40
Discharge: HOME | End: 2025-05-06
Payer: COMMERCIAL

## 2025-05-06 VITALS
DIASTOLIC BLOOD PRESSURE: 91 MMHG | HEART RATE: 87 BPM | HEIGHT: 63 IN | OXYGEN SATURATION: 99 % | BODY MASS INDEX: 28.35 KG/M2 | RESPIRATION RATE: 16 BRPM | TEMPERATURE: 98 F | WEIGHT: 160 LBS | SYSTOLIC BLOOD PRESSURE: 124 MMHG

## 2025-05-06 DIAGNOSIS — J01.40 ACUTE PANSINUSITIS, RECURRENCE NOT SPECIFIED: Primary | ICD-10-CM

## 2025-05-06 LAB — SARS-COV-2 RNA RESP QL NAA+PROBE: NOT DETECTED

## 2025-05-06 PROCEDURE — 99213 OFFICE O/P EST LOW 20 MIN: CPT | Performed by: NURSE PRACTITIONER

## 2025-05-06 PROCEDURE — U0002 COVID-19 LAB TEST NON-CDC: HCPCS | Performed by: NURSE PRACTITIONER

## 2025-05-06 NOTE — ED INITIAL ASSESSMENT (HPI)
Lab orders placed and instructions for plasma metanephrines mailed to her. (need to fast 12 hrs prior , refrain from alcohol, caffeine, medications, tabacco for at least 4 hrs prior to test), no epinephrine type drugs for 1 week prior - to be done May 1. Pt to call if has questions.    Patient reports sinus congestion x 3 days and now having bilateral ear pain/pressure/muffled hearing, left ear worse than right, denies fever

## 2025-05-06 NOTE — ED PROVIDER NOTES
Patient Seen in: [facility]      History     Chief Complaint   Patient presents with    Sinus Problem     Stated Complaint: sinus congestion    Subjective:   This is a 39-year-old female with below stated medical history.  Presents to immediate care for nasal congestion, sinus pain and pressure, and bilateral ear pain worse on the right than the left.  Reports having trouble hearing out of her right ear.  No fevers.  No coughing, feeding, or wheezing.  Taking Sudafed with no relief.     The history is provided by the patient.         History of Present Illness               Objective:     Past Medical History:    Essential hypertension    GERD    Hypertriglyceridemia    Migraines    Obesity              History reviewed. No pertinent surgical history.             Social History     Socioeconomic History    Marital status:     Number of children: 0   Occupational History    Occupation: hr asst   Tobacco Use    Smoking status: Former     Current packs/day: 0.00     Types: Cigarettes     Quit date:      Years since quittin.3     Passive exposure: Past    Smokeless tobacco: Former    Tobacco comments:     started     Vaping Use    Vaping status: Never Used   Substance and Sexual Activity    Alcohol use: Not Currently     Alcohol/week: 0.0 - 2.0 standard drinks of alcohol     Comment: 2 per week    Drug use: No    Sexual activity: Yes     Partners: Male   Other Topics Concern    Caffeine Concern Yes     Comment: 2 servings/day    Exercise No    Seat Belt Yes              Review of Systems   Constitutional:  Negative for chills and fever.   HENT:  Positive for congestion, ear pain, hearing loss, sinus pressure and sinus pain. Negative for ear discharge and sore throat.    Respiratory:  Negative for cough, shortness of breath and wheezing.    Cardiovascular:  Negative for chest pain, palpitations and leg swelling.   Gastrointestinal:  Negative for abdominal pain, constipation, diarrhea,  nausea and vomiting.   Genitourinary:  Negative for dysuria.   Musculoskeletal:  Negative for back pain, neck pain and neck stiffness.   Skin:  Negative for rash.   Neurological:  Positive for headaches.       Positive for stated complaint: sinus congestion  Other systems are as noted in HPI.  Constitutional and vital signs reviewed.      All other systems reviewed and negative except as noted above.                  Physical Exam     ED Triage Vitals [05/06/25 1242]   BP (!) 124/91   Pulse 87   Resp 16   Temp 97.5 °F (36.4 °C)   Temp src Oral   SpO2 99 %   O2 Device None (Room air)       Current Vitals:   Vital Signs  BP: (!) 124/91  Pulse: 87  Resp: 16  Temp: 97.5 °F (36.4 °C)  Temp src: Oral    Oxygen Therapy  SpO2: 99 %  O2 Device: None (Room air)        Physical Exam  Vitals and nursing note reviewed.   Constitutional:       General: She is not in acute distress.     Appearance: Normal appearance. She is normal weight. She is not ill-appearing, toxic-appearing or diaphoretic.   HENT:      Head: Normocephalic and atraumatic.      Right Ear: Tympanic membrane, ear canal and external ear normal. There is no impacted cerumen.      Left Ear: Tympanic membrane, ear canal and external ear normal. There is no impacted cerumen.      Nose: Congestion and rhinorrhea present.      Mouth/Throat:      Mouth: Mucous membranes are moist.      Pharynx: Oropharynx is clear. No oropharyngeal exudate or posterior oropharyngeal erythema.   Eyes:      General:         Right eye: No discharge.         Left eye: No discharge.      Extraocular Movements: Extraocular movements intact.      Conjunctiva/sclera: Conjunctivae normal.   Cardiovascular:      Rate and Rhythm: Normal rate and regular rhythm.      Heart sounds: Normal heart sounds. No murmur heard.  Pulmonary:      Effort: Pulmonary effort is normal. No respiratory distress.      Breath sounds: Normal breath sounds. No stridor. No wheezing, rhonchi or rales.   Musculoskeletal:       Cervical back: Neck supple.      Right lower leg: No edema.      Left lower leg: No edema.   Skin:     General: Skin is warm and dry.      Capillary Refill: Capillary refill takes less than 2 seconds.      Findings: No rash.   Neurological:      Mental Status: She is alert and oriented to person, place, and time.   Psychiatric:         Mood and Affect: Mood normal.         Behavior: Behavior normal.           Physical Exam                ED Course     Labs Reviewed   RAPID SARS-COV-2 BY PCR - Normal          Results            Rapid covid               MDM          Vital signs stable. Patient is well-appearing and nontoxic looking. Patient presents to immediate care for sinus pain and pressure, nasal congestion, and ear pain.    Differential diagnosis include bacterial sinusitis, viral sinusitis, COVID, allergic rhinitis    Rapid Covid is negative.  Bilateral TMs are intact revealing middle ear effusions with no evidence of otitis media.     Exam consistent with acute viral versus bacterial sinusitis.    DC home.  Watchful waiting course of Augmentin prescribed. Recommended over-the-counter antihistamines and Flonase. Nasal saline rinses. Follow-up with PCP in 1 week. Reasons to seek emergent treatment reinforced. Patient verbalized understanding, and agreed with plan of care. All questions answered.        Medical Decision Making      Disposition and Plan     Clinical Impression:  1. Acute pansinusitis, recurrence not specified         Disposition:  Discharge  5/6/2025  1:06 pm    Follow-up:  Tuan Hughes DO  [address] [place] [id number]  [phone]      As needed          Medications Prescribed:  Discharge Medication List as of 5/6/2025  1:09 PM        START taking these medications    Details   amoxicillin clavulanate 875-125 MG Oral Tab Take 1 tablet by mouth 2 (two) times daily for 10 days., Normal, Disp-20 tablet, R-0             Supplementary Documentation:

## 2025-05-06 NOTE — DISCHARGE INSTRUCTIONS
Please take antibiotics as prescribed.  I recommend over-the-counter antihistamines and Flonase.  PCP follow-up as needed.

## 2025-05-19 RX ORDER — CYCLOBENZAPRINE HCL 10 MG
10 TABLET ORAL NIGHTLY PRN
Qty: 30 TABLET | Refills: 0 | Status: SHIPPED | OUTPATIENT
Start: 2025-05-19

## 2025-05-19 NOTE — TELEPHONE ENCOUNTER
Requested Prescriptions     Pending Prescriptions Disp Refills    CYCLOBENZAPRINE 10 MG Oral Tab [Pharmacy Med Name: Cyclobenzaprine HCl 10 MG Oral Tablet] 30 tablet 0     Sig: TAKE 1 TABLET BY MOUTH NIGHTLY AS NEEDED FOR MUSCLE SPASM          Date of last refill: 04/18/25 (#30/0)  [place]    Cyclobenzaprine HCl     Dispensed Written Strength Quantity Refills Days Supply Provider Pharmacy   CYCLOBENZAPRINE 10MG   TAB 04/18/2025 04/18/2025  30 each  30 Bang Chiang MD [facility] ...   CYCLOBENZAPRINE 10MG   TAB 03/14/2025 12/24/2024  30 each  30 Bang Chiang MD [facility] ...   CYCLOBENZAPRINE 10MG   TAB 02/10/2025 12/24/2024  30 each  30 Bang Chiang MD [facility] ...   CYCLOBENZAPRINE 10MG   TAB 12/24/2024 12/24/2024  30 each  30 Bang Chiang MD [facility]...     Last office visit: 04/15/25  Due back to clinic per last office note:  none  Date next office visit scheduled:  05/27/25  Future Appointments   Date Time Provider Department Center   5/27/2025 10:00 AM Bang Chiang MD [facility]   6/10/2025  1:20 PM Mel Go APRN [facility] [address]   6/20/2025  1:30 PM Brenda Holcomb MD [facility] [place]           Last OV note recommendation:    Impression   CONCLUSION: No acute intracranial abnormality.         ASSESSMENT/PLAN:          ICD-10-CM     1. Chronic migraine w/o aura w/o status migrainosus, not intractable  G43.709 triamcinolone acetonide (Kenalog-40) 40 MG/ML injection 80 mg       lidocaine (Xylocaine) 1 % injection       DISCONTINUED: lidocaine PF (Xylocaine-MPF) 1% injection       2. Bilateral occipital neuralgia  M54.81                  Patient is a 39-year-old female with history of chronic migraines presenting with a bilateral greater occipital neuralgia pain     On Botox therapy, last injection in Dec 2024  Due to change in insurance needs a new PA     Recommended  bilateral occipital nerve block for intractable headache/ neuralgia pain     Continue Flexeril 5-10 mg as needed for TMJ pain  Continue Eletriptan as needed.      Bang Chiang MD  [facility]    This note was prepared using Dragon Medical voice recognition dictation software. As a result errors may occur. When identified these errors have been corrected. While every attempt is made to correct errors during dictation discrepancies may still exist           Meds This Visit:  Requested Prescriptions        No prescriptions requested or ordered in this encounter         Imaging & Referrals:  None      ID#[id number]

## 2025-05-27 ENCOUNTER — OFFICE VISIT (OUTPATIENT)
Dept: NEUROLOGY | Facility: CLINIC | Age: 40
End: 2025-05-27
Payer: COMMERCIAL

## 2025-05-27 DIAGNOSIS — G43.709 CHRONIC MIGRAINE W/O AURA W/O STATUS MIGRAINOSUS, NOT INTRACTABLE: Primary | ICD-10-CM

## 2025-05-27 PROCEDURE — 64615 CHEMODENERV MUSC MIGRAINE: CPT | Performed by: OTHER

## 2025-05-27 NOTE — PROGRESS NOTES
Paperwork noting that patient may bear financial responsibility for procedure(s) performed in clinic today signed prior to proceeding with procedure(s).    Furthermore, patient notified that they should contact their insurer to verify that your procedure/test has been approved and is a COVERED benefit.  Although the [facility] staff does its due diligence, the insurance carrier gives the disclaimer that \"Although the procedure is authorized, this does not guarantee payment.\"    Ultimately the patient is responsible for payment.    Botox is:  [x] Buy and Bill  [] Patient Supplied      Botox Reauthorization Questions:  Has the patient experienced a reduction in frequency of migraines since starting Botox? yes  If yes, by what percentage? 75%  Has the intensity of migraines decreased since starting Botox? yes  If yes, by what percentage? 75%    [x] I have discussed with patient that if their insurance changes they must contact the office right away with that information so that a new prior authorization can be completed.  Patient verbalized understanding that Botox cannot be performed without a current prior authorization in place with correct insurance.

## 2025-05-30 ENCOUNTER — PATIENT MESSAGE (OUTPATIENT)
Dept: INTERNAL MEDICINE CLINIC | Facility: CLINIC | Age: 40
End: 2025-05-30

## 2025-06-01 NOTE — TELEPHONE ENCOUNTER
Patient has new insurance.  Came up as [employer] in the epic system.  The ID # is correct in her chart from what she reported.  It is denied/not covered per this new insurance plan    Prior Authorization History  Tirzepatide-Weight Management (ZEPBOUND) 15 MG/0.5ML Subcutaneous Solution Auto-injector     History    View all authorizations for this medication  Closed   6/1/2025  4:47 PM  Close reason: Product not covered by this plan. Prior Authorization not available.   Note from payer: Prior Authorization Not Available - This product is excluded from this benefit plan.   Payer: [facility] [facility] Case ID: [id number]    [phone]

## 2025-06-09 PROBLEM — E28.2 PCOS (POLYCYSTIC OVARIAN SYNDROME): Status: ACTIVE | Noted: 2025-06-09

## 2025-06-09 NOTE — PROGRESS NOTES
[facility] Weight Management follow up via video visit:    Subjective    This visit is conducted using Telemedicine with live, interactive video and audio.    Chief Complaint:  Routine follow up visit for lifestyle and medical management for overweight, obesity, or morbid obesity. LOV in clinic weight: 169#.    PMH reviewed. Bariatric surgery planned or hx of surgery in the past: no.    HPI:   Karlie Dash is a 40 year old female who is being followed up today for lifestyle and medical management as deemed appropriate for overweight, obesity or morbid obesity. Patient reports weight loss monitoring at home via scale with a weight of 155#. This appears to be a weight loss since LOV 4 months ago in clinic. Patient has been consistent with medication, but now paying OOP for Zepbound 15 mg weekly locally d/t new insurance not covering AOMs- a plan exclusion.    Questions/Concerns/Comments since LOV: Chronic constipation with BM every other day reported. Focused on protein intake, minimal produce. Feeling some return of hunger in her gut, not in her head.    Lifestyle/Social Hx Reviewed:    [facility] Medical Weight Loss Follow Up    Question 6/5/2025  2:20 PM CDT - Filed by Patient   Please describe a success moment: Started lifting weights and seeing an improvement in muscle tone.   Please describe a challenging moment/needs for improvement: My weight loss has been a big stagnant for the past month.   Please complete this 24 hour food journal, listing everything you had to eat in the past day. Include the average time of day you ate these meals at    List foods, qty and prep for breakfast: 2 hardboiled eggs, pretzel sticks and slice of cheddar cheese   List foods, qty and prep for lunch. 2 chicken drumsticks and 1 cup green spaghetti.   List foods, qty and prep for dinner. ham and cheese sandwich, goldfish   List foods, qty and prep for snacks. apple slices and caramel dipping sauce. Pears. Protein shake   List the  types and qty of fluids consumed 80 oz of water   On average, how many meals did you eat out per week? 2   Exercise    How many days per week are you active or exercise 4   On average, how many days were anaerobic (strength/resistance) exercises performed? 4   On average, how many days were aerobic (cardio) exercises performed? 4   Perceived level of exertion on a scale of 1-5, with 5 being very intense: 3   Stress    Average stress level on a scale of 1-10, with 10 being extremely stressed: 6   If greater than 5/1O how would you grade your coping mechanisms? moderate   Sleep hours and integrity    How many hours of uninterrupted sleep do you get a night: 7   Do you feel rested in the morning: No   If no, what may have been disrupting your sleep? I wake up a lot to go to the bathroom. I also clench my teeth,   Please list any goal(s) for your next visit get closer to 10 pounds of weight loss       ROS  General: feeling well, denies fatigue  EYES: denies vision changes or high pain/pressure.  CV: denies cp, palpitations  Resp: denies sob  GI: denies abdominal pain. Denies N/V/D, see above  Neuro: denies paresthesia or cognitive changes  Psych: denies any mood changes    Physical Exam:  >   BP Readings from Last 3 Encounters:   05/06/25 (!) 124/91   04/15/25 109/74   02/28/25 116/78       Home weight: see above  Home BP: not available  Home blood sugars: n/a  Today's calculated BMI from reported weight: 27.5    General: patient speaking in full sentences, no increased work of breathing. alert, appears stated age, cooperative, and no distress  HENT: normocephalic  Resp: Breathing is non labored  Psych: patient appears cheerful, smiling, making good eye contact    Diagnoses and all orders for this visit:    Encounter for therapeutic drug monitoring  -     naltrexone 50 MG Oral Tab; Take 0.5 tablets (25 mg total) by mouth daily. For cravings  -     buPROPion  MG Oral Tablet 24 Hr; Take 1 tablet (150 mg total) by  mouth every morning.  -     Lipid Panel; Future    Class 3 severe obesity with serious comorbidity and body mass index (BMI) of 40.0 to 44.9 in adult, unspecified obesity type  Comments:  Baseline BMI: 39.69 (11/27/23)  Orders:  -     Tirzepatide-Weight Management (ZEPBOUND) 10 MG/0.5ML Subcutaneous Solution; Inject 10 mg into the skin once a week.  -     naltrexone 50 MG Oral Tab; Take 0.5 tablets (25 mg total) by mouth daily. For cravings  -     buPROPion  MG Oral Tablet 24 Hr; Take 1 tablet (150 mg total) by mouth every morning.  -     Lipid Panel; Future    Prediabetes  -     Tirzepatide-Weight Management (ZEPBOUND) 10 MG/0.5ML Subcutaneous Solution; Inject 10 mg into the skin once a week.  -     Lipid Panel; Future    Dyslipidemia  -     Tirzepatide-Weight Management (ZEPBOUND) 10 MG/0.5ML Subcutaneous Solution; Inject 10 mg into the skin once a week.  -     Lipid Panel; Future        Patient Instructions   Continue making lifestyle changes that focus on good nutrition, regular exercise and stress management.    Medication Plan: Finish Zepbound 15 mg weekly and then reduce Zepbound to 10 mg weekly via single use vial via cash pay through [employer]. They will notify you for billing and shipping details. You can visit the website at www.zepbound.[email or web address] and click on Consumers for more details on this program and instructions on dosing.    Add generic alternative to Contrave (www.contrave.com) with Naltrexone and Bupropion XL. Contrave is an anti-obesity medication (AOM) that has been on the market for 10 years with a weight loss rate of 12-15% total body weight when taken in combination with regular exercise of at least 150 minutes/week and a reduced calorie nutrition plan. Naltrexone and Bupropion XL help to reduce hunger and cravings by action in the brain. The most common side effects can include nausea, dry mouth and constipation. Please do not take Naltrexone with any opioid (codeine, norco,  tramadol/ultram, percocet are some examples) based medication as the effect of the medication will likely be blocked by Naltrexone. It is safe to take Naltrexone with an over the counter pain medication, if needed, as this will not be affected.    The medication doses can be increased as early as 2-4 weeks from start of treatment if you are not experiencing adequate appetite and craving control. If this is the case, please send me a [employer] message with your status and home scale weight so I can determine if an increase is warranted. This was sent to your local pharmacy.      Tips while taking an injectable medication:    Be an intuitive eater. Listen to your hunger and fullness signals, stopping when you are full.  Consume protein and produce in your day, striving for a rainbow of color of produce.  Reduce portions to starting size of 1 cup and check in with your gut to see if you are full. Use a sand timer to slow down your eating pace to allow for 15-20 minutes to complete a meal and use the \"2 bite rule\".  Reduce refined sugars and high fat foods, as they may contribute to greater side effects of nausea and heartburn.  Stop eating 3 hours before bedtime to allow your food to digest.  Remain hydrated with water or non caloric and non caffeine beverages.  Use over the counter cindi lozenge/supplement to help reduce nausea if needed.  If you have been off your medication for more than 2 weeks please notify our office to determine next dosing, as a return to previous dose may not be appropriate or tolerated.  Zepbound can be kept at room temperature for up to 3 weeks.      Next steps to work on before next visit include: Great work in building a healthy lifestyle! Continue to focus on balanced plate eating with protein and water. Add vegetables regularly not only for the fiber to reduce constipation, but also to help you feel more full as a low calorie food item while supplying multiple vitamins, minerals and  phytonutrients. Recheck fasting lipid panel with upcoming labs.    Nutrition and MyPlate: Vegetables  Vegetables are a major source of fiber. They’re also packed with vitamins needed for health and growth. At mealtimes, make half your plate fruits and vegetables.  Nutrient-rich choices  Fresh, frozen, or canned--all vegetables are high in nutrients. The color of the skin tells you what’s inside. So if you eat plenty of colors, you get a variety of nutrients. Some good choices include:  Dark green vegetables, such as spinach, sima greens, kale, and broccoli.  Bright red and orange vegetables, such as carrots, sweet potatoes, red bell peppers, and tomatoes.  Starchy vegetables, such as potatoes and squash.  What makes vegetables less healthy?  Boiling vegetables causes some vitamins to escape into the water. To hold on to vitamins, briefly steam, sauté, stir-olivia, or microwave instead. Overcooking destroys vitamins, so try to keep vegetables a little crispy.  Using a lot of margarine, butter, or salad dressing adds fat and calories, but not many nutrients. A small amount of these toppings is OK. But the more you add, the more fat you add, too.  Frozen vegetables that come with cheese sauce or other processed flavoring are high in fat and salt. It's healthier to season plain frozen vegetables yourself. Try fresh herbs, garlic, toasted almonds, or sesame seeds.  Canned vegetables often have lots of salt. Shop for low-sodium varieties.  One small change  Sneak vegetables into every meal. Shred carrots into hamburger, or add zucchini to spaghetti and meatballs. You won't even notice! Have a better idea? Write it here:  ________________________________________________________  Date Last Reviewed: 10/1/2017  © 9607-9533 [employer]. [address] [place] [address], [place] [address]. All rights reserved. This information is not intended as a substitute for professional medical care. Always follow your healthcare  professional's instructions.        Staying Healthy While Traveling    by Anette Martin RD    OAC at www.obesityaction.org Summer 2023 Resource    It’s time to go on a vacation! Summertime is a popular season for travel, and vacations are a wonderful way to unwind, discover new places, and spend time with family and friends. Taking a break from your daily routine is really important. When you return, you’ll feel refreshed and ready for the next things you need to do! As you pack your bags and plan your trip, remember to stay focused on your health and wellness goals. With a bit of planning, it’s not hard to stay active, eat well, and have a great time during your vacation.    Do Your Research and Plan Ahead  Keeping yourself on track during a vacation might feel like an impossible challenge. However, with the right preparation, it’s entirely achievable. Before you leave, take some time to plan your vacation. Look for ways to include health and nutrition in your itinerary, such as trying new activities and foods. You’ll be pleasantly surprised to find that many places have great options for staying healthy.    Check your Nutrition  Instead of constantly eating sweets, fried foods and junk, seek out healthier alternatives. Choosing nutritious foods while traveling has many advantages. First, eating healthy will give you more energy, which is important for having a fun vacation. Second, sticking to your plan will give you a sense of accomplishment and satisfaction. Here are some tips to help you choose wisely and prioritize your well-being.    Consider using grocery delivery services. You can shop online from home and have meals and snacks delivered to your hotel. Just make sure your room has a refrigerator. This way, you won’t have to search for a store and can avoid the temptation of unhealthy vacation treats. You can stock up on fresh produce, cheese sticks, popcorn and protein bars to keep in your hotel  room.    Make eating out fun by finding new restaurants to try at your destination. Take a moment to look up their menus online and come up with a plan. You can plan to try fresh fish at a local beach restaurant, visit a steakhouse and choose a lean cut of meat with a side salad, or enjoy chicken fajitas at a Mexican restaurant.  Remember to give yourself a break. It’s okay to indulge in a special meal or snack during your vacation. One treat doesn’t mean the whole day is ruined. Just get back on track with your healthy eating at the next meal.    Finding Fitness  Discovering fun fitness activities can help you build a habit that you’ll want to continue. When you’re on vacation, make it a point to set aside time to move your body. This will not only increase your energy levels but also make you feel positive and satisfied about being active. Be creative and think outside the box to come up with exciting ideas to stay active during your vacation!    Try to find ways to move throughout the day. Look for trails in a local park, a gym at your hotel, or join an exercise class for the day. Keep things interesting by varying your activities and trying something new.  Involve the whole family in staying active. Rent bikes for an afternoon, plan a walk after dinner, or try something different like surfing! You can also let your kids choose an activity. You might be surprised to hear what ideas they have!    Mind Your Mental Health  Vacations are meant to be relaxing and a chance to recharge. Make sure you have a vacation that helps you rest and rejuvenate! Sometimes it’s easy to plan too many activities and take on too many commitments. Take a moment to find balance between fun and fitness.    Pack items you enjoy. Bring your favorite books, puzzles, music, or whatever you love to ensure you have time to do just that. Sneaking away for a few minutes can give you time to spend on your favorite things.  Remember to rest.  Vacations can be full of exciting things, but it’s also important to take it easy. Take an afternoon nap or find some quiet time alone to recharge.    Include activities that you enjoy. Vacations offer a lot of things to do, so make sure you have activities that you personally enjoy along with the rest of the family.    How to: Make it Through a Long Day of Travel  Whether you’re taking a road trip for a few hours or a plane across the [place] travel days require some planning. Here are some tips to make your trip easier.    Pack a snack bag for long road trips or airport days. When in the car, consider bringing a cooler with water to avoid sugary snacks at the gas stations. Pack fresh fruits, vegetables, water, cheese and meats. Include snacks like popcorn, pretzels or whole-grain crackers. Airline travel can be more challenging, but you can pack snacks such as protein bars, trail mix or popcorn in your carry-on. Bring a refillable water bottle to stay hydrated throughout the day!    Find ways to walk, even on your busiest travel day. If you’re driving, take quick 10-15-minute walks every couple of hours. It will not only give you some cardio exercise but also reduce stress and improve your mood during long car rides. If you’re stuck in an airport, walk up and down the terminal while you wait.  Bring items like books, adult coloring books or knitting supplies to keep busy and reduce screen time. This can be a great opportunity to explore a new hobby and let your creative juices flow.  How to: Make Healthy Food Choices When Eating Out  Making healthy food choices while eating out on vacation can be challenging.    Instead of donuts and pastries for breakfast, go for high-protein items like eggs, turkey sausage, turkey whitfield, oats and whole-grain toast. Ask for fresh fruit or low-fat yogurt on the side to make breakfast even more nutritious.  Restaurant portions can be large. If someone in your group is willing to  split a meal, that can be an easy solution. Since you might not have enough space to take leftovers with you, you may have to leave some behind.  When looking at the lunch or dinner menu, choose grilled, baked or boiled options. This can save you a lot of calories and fat. Be mindful of side dishes, as they can add up quickly. Consider swapping a high-calorie side item for a side salad, fruit or vegetables.    Desserts can be tempting, but plan ahead if you want to order one. Choose a lower-calorie meal to balance it out. You can also share the dessert with others at your table to enjoy a smaller portion.  How to: Deal With an unexpected change of plans  We’ve all been there. Your perfectly planned vacation goes sideways. How do you make the most of these days? It can be easy to throw in the towel, but always be ready with a backup plan.    The weather can change unexpectedly. A rainy day might ruin your beach or mountain hike plans. Take on the challenge and find new ways to have fun. Look for an indoor pool or an activity center to keep working towards your goals!    Your carefully chosen restaurant may not have healthy options, or your grocery store delivery might be late. Don’t give up; just adapt and make a new decision. Make the best choice you can and move on to the next meal. It’s okay if it’s not perfect!    Sometimes your perfectly planned day doesn’t go as expected. Kids may start fighting during a trip to the park, or someone might get sick on vacation. Focus on the positive moments and enjoy the time you have.  Wherever you go on your summer vacation, make sure to enjoy the break, relaxation and adventure. Taking time away from your usual routine can be amazing, and keeping up with your health and fitness goals can make your vacation even better.      Return in about 4 months (around 10/10/2025) for weight management via clinic or Telemedicine Visit and in clinic in January.    DOCUMENTATION OF TIME  SPENT: Code selection for this visit was based on time spent : 30 minutes on date of service in preparing to see the patient, obtaining and/or reviewing separately obtained history, performing a medically appropriate examination, counseling and educating the patient/family/caregiver, ordering medications or testing, referring and communicating with other healthcare providers, documenting clinical information in the electronic medical record, independently interpreting results and communicating results to the patient/family/caregiver and care coordination with the patient's other providers.    Answers submitted by the patient for this visit:  Medical Weight Loss Follow Up (Submitted on 6/5/2025)  If greater than 5/1O how would you grade your coping mechanisms?: moderate

## 2025-06-10 ENCOUNTER — TELEMEDICINE (OUTPATIENT)
Dept: INTERNAL MEDICINE CLINIC | Facility: CLINIC | Age: 40
End: 2025-06-10
Payer: COMMERCIAL

## 2025-06-10 DIAGNOSIS — R73.03 PREDIABETES: ICD-10-CM

## 2025-06-10 DIAGNOSIS — Z51.81 ENCOUNTER FOR THERAPEUTIC DRUG MONITORING: Primary | ICD-10-CM

## 2025-06-10 DIAGNOSIS — E78.5 DYSLIPIDEMIA: ICD-10-CM

## 2025-06-10 DIAGNOSIS — E66.813 CLASS 3 SEVERE OBESITY WITH SERIOUS COMORBIDITY AND BODY MASS INDEX (BMI) OF 40.0 TO 44.9 IN ADULT, UNSPECIFIED OBESITY TYPE: ICD-10-CM

## 2025-06-10 PROBLEM — I10 HYPERTENSION: Status: ACTIVE | Noted: 2024-05-01

## 2025-06-10 PROCEDURE — 98006 SYNCH AUDIO-VIDEO EST MOD 30: CPT | Performed by: NURSE PRACTITIONER

## 2025-06-10 RX ORDER — NALTREXONE HYDROCHLORIDE 50 MG/1
25 TABLET, FILM COATED ORAL DAILY
Qty: 15 TABLET | Refills: 2 | Status: SHIPPED | OUTPATIENT
Start: 2025-06-10

## 2025-06-10 RX ORDER — TIRZEPATIDE 10 MG/.5ML
10 INJECTION, SOLUTION SUBCUTANEOUS WEEKLY
Qty: 2 ML | Refills: 3 | Status: SHIPPED | OUTPATIENT
Start: 2025-06-10

## 2025-06-10 RX ORDER — BUPROPION HYDROCHLORIDE 150 MG/1
150 TABLET ORAL EVERY MORNING
Qty: 30 TABLET | Refills: 2 | Status: SHIPPED | OUTPATIENT
Start: 2025-06-10

## 2025-06-10 NOTE — PATIENT INSTRUCTIONS
Continue making lifestyle changes that focus on good nutrition, regular exercise and stress management.    Medication Plan: Finish Zepbound 15 mg weekly and then reduce Zepbound to 10 mg weekly via single use vial via cash pay through [employer]. They will notify you for billing and shipping details. You can visit the website at www.zepbound.[email or web address] and click on Consumers for more details on this program and instructions on dosing.    Add generic alternative to Contrave (www.contrave.com) with Naltrexone and Bupropion XL. Contrave is an anti-obesity medication (AOM) that has been on the market for 10 years with a weight loss rate of 12-15% total body weight when taken in combination with regular exercise of at least 150 minutes/week and a reduced calorie nutrition plan. Naltrexone and Bupropion XL help to reduce hunger and cravings by action in the brain. The most common side effects can include nausea, dry mouth and constipation. Please do not take Naltrexone with any opioid (codeine, norco, tramadol/ultram, percocet are some examples) based medication as the effect of the medication will likely be blocked by Naltrexone. It is safe to take Naltrexone with an over the counter pain medication, if needed, as this will not be affected.    The medication doses can be increased as early as 2-4 weeks from start of treatment if you are not experiencing adequate appetite and craving control. If this is the case, please send me a Quibly message with your status and home scale weight so I can determine if an increase is warranted. This was sent to your local pharmacy.      Tips while taking an injectable medication:    Be an intuitive eater. Listen to your hunger and fullness signals, stopping when you are full.  Consume protein and produce in your day, striving for a rainbow of color of produce.  Reduce portions to starting size of 1 cup and check in with your gut to see if you are full. Use a sand timer to slow down  your eating pace to allow for 15-20 minutes to complete a meal and use the \"2 bite rule\".  Reduce refined sugars and high fat foods, as they may contribute to greater side effects of nausea and heartburn.  Stop eating 3 hours before bedtime to allow your food to digest.  Remain hydrated with water or non caloric and non caffeine beverages.  Use over the counter cindi lozenge/supplement to help reduce nausea if needed.  If you have been off your medication for more than 2 weeks please notify our office to determine next dosing, as a return to previous dose may not be appropriate or tolerated.  Zepbound can be kept at room temperature for up to 3 weeks.      Next steps to work on before next visit include: Great work in building a healthy lifestyle! Continue to focus on balanced plate eating with protein and water. Add vegetables regularly not only for the fiber to reduce constipation, but also to help you feel more full as a low calorie food item while supplying multiple vitamins, minerals and phytonutrients. Recheck fasting lipid panel with upcoming labs.    Nutrition and MyPlate: Vegetables  Vegetables are a major source of fiber. They’re also packed with vitamins needed for health and growth. At mealtimes, make half your plate fruits and vegetables.  Nutrient-rich choices  Fresh, frozen, or canned--all vegetables are high in nutrients. The color of the skin tells you what’s inside. So if you eat plenty of colors, you get a variety of nutrients. Some good choices include:  Dark green vegetables, such as spinach, sima greens, kale, and broccoli.  Bright red and orange vegetables, such as carrots, sweet potatoes, red bell peppers, and tomatoes.  Starchy vegetables, such as potatoes and squash.  What makes vegetables less healthy?  Boiling vegetables causes some vitamins to escape into the water. To hold on to vitamins, briefly steam, sauté, stir-olivia, or microwave instead. Overcooking destroys vitamins, so try to  keep vegetables a little crispy.  Using a lot of margarine, butter, or salad dressing adds fat and calories, but not many nutrients. A small amount of these toppings is OK. But the more you add, the more fat you add, too.  Frozen vegetables that come with cheese sauce or other processed flavoring are high in fat and salt. It's healthier to season plain frozen vegetables yourself. Try fresh herbs, garlic, toasted almonds, or sesame seeds.  Canned vegetables often have lots of salt. Shop for low-sodium varieties.  One small change  Sneak vegetables into every meal. Shred carrots into hamburger, or add zucchini to spaghetti and meatballs. You won't even notice! Have a better idea? Write it here:  ________________________________________________________  Date Last Reviewed: 10/1/2017  © 1055-5323 [employer]. [address] [place] [address]. All rights reserved. This information is not intended as a substitute for professional medical care. Always follow your healthcare professional's instructions.        Staying Healthy While Traveling    by ESTEVAN Cee at www.obesityaction.org Summer 2023 Resource    It’s time to go on a vacation! Summertime is a popular season for travel, and vacations are a wonderful way to unwind, discover new places, and spend time with family and friends. Taking a break from your daily routine is really important. When you return, you’ll feel refreshed and ready for the next things you need to do! As you pack your bags and plan your trip, remember to stay focused on your health and wellness goals. With a bit of planning, it’s not hard to stay active, eat well, and have a great time during your vacation.    Do Your Research and Plan Ahead  Keeping yourself on track during a vacation might feel like an impossible challenge. However, with the right preparation, it’s entirely achievable. Before you leave, take some time to plan your vacation. Look for ways to  include health and nutrition in your itinerary, such as trying new activities and foods. You’ll be pleasantly surprised to find that many places have great options for staying healthy.    Check your Nutrition  Instead of constantly eating sweets, fried foods and junk, seek out healthier alternatives. Choosing nutritious foods while traveling has many advantages. First, eating healthy will give you more energy, which is important for having a fun vacation. Second, sticking to your plan will give you a sense of accomplishment and satisfaction. Here are some tips to help you choose wisely and prioritize your well-being.    Consider using grocery delivery services. You can shop online from home and have meals and snacks delivered to your hotel. Just make sure your room has a refrigerator. This way, you won’t have to search for a store and can avoid the temptation of unhealthy vacation treats. You can stock up on fresh produce, cheese sticks, popcorn and protein bars to keep in your hotel room.    Make eating out fun by finding new restaurants to try at your destination. Take a moment to look up their menus online and come up with a plan. You can plan to try fresh fish at a local beach restaurant, visit a steakhouse and choose a lean cut of meat with a side salad, or enjoy chicken fajitas at a Mexican restaurant.  Remember to give yourself a break. It’s okay to indulge in a special meal or snack during your vacation. One treat doesn’t mean the whole day is ruined. Just get back on track with your healthy eating at the next meal.    Finding Fitness  Discovering fun fitness activities can help you build a habit that you’ll want to continue. When you’re on vacation, make it a point to set aside time to move your body. This will not only increase your energy levels but also make you feel positive and satisfied about being active. Be creative and think outside the box to come up with exciting ideas to stay active during your  vacation!    Try to find ways to move throughout the day. Look for trails in a local park, a gym at your hotel, or join an exercise class for the day. Keep things interesting by varying your activities and trying something new.  Involve the whole family in staying active. Rent bikes for an afternoon, plan a walk after dinner, or try something different like surfing! You can also let your kids choose an activity. You might be surprised to hear what ideas they have!    Mind Your Mental Health  Vacations are meant to be relaxing and a chance to recharge. Make sure you have a vacation that helps you rest and rejuvenate! Sometimes it’s easy to plan too many activities and take on too many commitments. Take a moment to find balance between fun and fitness.    Pack items you enjoy. Bring your favorite books, puzzles, music, or whatever you love to ensure you have time to do just that. Sneaking away for a few minutes can give you time to spend on your favorite things.  Remember to rest. Vacations can be full of exciting things, but it’s also important to take it easy. Take an afternoon nap or find some quiet time alone to recharge.    Include activities that you enjoy. Vacations offer a lot of things to do, so make sure you have activities that you personally enjoy along with the rest of the family.    How to: Make it Through a Long Day of Travel  Whether you’re taking a road trip for a few hours or a plane across the [place] travel days require some planning. Here are some tips to make your trip easier.    Pack a snack bag for long road trips or airport days. When in the car, consider bringing a cooler with water to avoid sugary snacks at the gas stations. Pack fresh fruits, vegetables, water, cheese and meats. Include snacks like popcorn, pretzels or whole-grain crackers. Airline travel can be more challenging, but you can pack snacks such as protein bars, trail mix or popcorn in your carry-on. Bring a refillable water  bottle to stay hydrated throughout the day!    Find ways to walk, even on your busiest travel day. If you’re driving, take quick 10-15-minute walks every couple of hours. It will not only give you some cardio exercise but also reduce stress and improve your mood during long car rides. If you’re stuck in an airport, walk up and down the terminal while you wait.  Bring items like books, adult coloring books or knitting supplies to keep busy and reduce screen time. This can be a great opportunity to explore a new hobby and let your creative juices flow.  How to: Make Healthy Food Choices When Eating Out  Making healthy food choices while eating out on vacation can be challenging.    Instead of donuts and pastries for breakfast, go for high-protein items like eggs, turkey sausage, turkey whitfield, oats and whole-grain toast. Ask for fresh fruit or low-fat yogurt on the side to make breakfast even more nutritious.  Restaurant portions can be large. If someone in your group is willing to split a meal, that can be an easy solution. Since you might not have enough space to take leftovers with you, you may have to leave some behind.  When looking at the lunch or dinner menu, choose grilled, baked or boiled options. This can save you a lot of calories and fat. Be mindful of side dishes, as they can add up quickly. Consider swapping a high-calorie side item for a side salad, fruit or vegetables.    Desserts can be tempting, but plan ahead if you want to order one. Choose a lower-calorie meal to balance it out. You can also share the dessert with others at your table to enjoy a smaller portion.  How to: Deal With an unexpected change of plans  We’ve all been there. Your perfectly planned vacation goes sideways. How do you make the most of these days? It can be easy to throw in the towel, but always be ready with a backup plan.    The weather can change unexpectedly. A rainy day might ruin your beach or mountain hike plans. Take on  the challenge and find new ways to have fun. Look for an indoor pool or an activity center to keep working towards your goals!    Your carefully chosen restaurant may not have healthy options, or your grocery store delivery might be late. Don’t give up; just adapt and make a new decision. Make the best choice you can and move on to the next meal. It’s okay if it’s not perfect!    Sometimes your perfectly planned day doesn’t go as expected. Kids may start fighting during a trip to the park, or someone might get sick on vacation. Focus on the positive moments and enjoy the time you have.  Wherever you go on your summer vacation, make sure to enjoy the break, relaxation and adventure. Taking time away from your usual routine can be amazing, and keeping up with your health and fitness goals can make your vacation even better.

## 2025-06-11 NOTE — PROGRESS NOTES
CHIEF COMPLAINT:   Patient presents with:  Ear Pain      HPI:   Barbara Contreras is a 32year old female who presents for cold symptoms for  3  days. Symptoms have progressed into sinus congestion and been worsening since onset.  Sinus congestion/pain is descri Subjective     Patient ID: Bryant Gil is a 82 y.o. male.    Chief Complaint: No chief complaint on file.    HPI  Review of Systems       Objective     Physical Exam       Assessment and Plan     1. SOB (shortness of breath)  -     SCHEDULED EKG 12-LEAD (to Muse)    2. PSVT (paroxysmal supraventricular tachycardia)  -     SCHEDULED EKG 12-LEAD (to Muse)    3. PVC (premature ventricular contraction)  -     SCHEDULED EKG 12-LEAD (to Muse)                 No follow-ups on file.     /80 mmHg  Pulse 77  Temp(Src) 99 °F (37.2 °C)  Resp 14  Ht 63\"  Wt 200 lb  BMI 35.44 kg/m2  SpO2 98%  LMP 03/01/2017 (Exact Date)  Breastfeeding?  No  GENERAL: well developed, well nourished,in no apparent distress  SKIN: no rashes,no suspicious lesi You have an infection of the middle ear, the space behind the eardrum. This is also called acute otitis media (AOM). Sometimes it is caused by the common cold.  This is because congestion can block the internal passage (eustachian tube) that drains fluid fr The patient indicates understanding of these issues and agrees to the plan. The patient is asked to return if sx's persist or worsen.

## 2025-06-19 RX ORDER — CYCLOBENZAPRINE HCL 10 MG
10 TABLET ORAL NIGHTLY PRN
Qty: 30 TABLET | Refills: 0 | Status: SHIPPED | OUTPATIENT
Start: 2025-06-19

## 2025-06-19 NOTE — TELEPHONE ENCOUNTER
Requested Prescriptions     Pending Prescriptions Disp Refills    CYCLOBENZAPRINE 10 MG Oral Tab [Pharmacy Med Name: Cyclobenzaprine HCl 10 MG Oral Tablet] 30 tablet 0     Sig: TAKE 1 TABLET BY MOUTH NIGHTLY AS NEEDED FOR MUSCLE SPASM     [place]  Cyclobenzap[place] HCl     Dispensed Written Strength Quantity Refills Days Supply Provider Pharmacy   CYCLOBENZAPRINE HCL 05/19/2025 05/19/2025 10 mg 30  30 MUMALIK VASQUEZ MD [facility]    CYCLOBENZAPRINE HCL 04/18/2025 04/18/2025 10 mg 30  30 MUQTADAMALIK GAVIRIA MD [facility]    CYCLOBENZAPRINE HCL 03/14/2025 12/24/2024 10 mg 30  30 MUQTADAMALIK GAVIRIA MD [facility]      Last OV: 5/27/2025  Next OV:     Last office visit plan;    Procedure: Botox injection -chemodenervation  Consent: obtained after explanation of procedure detail, benefits and risks     Indication:   -chronic migraine patient who suffers 15 or more days with headache lasting 4 hours a day or longer.     Procedure description:  -Chronic Migraine  Dilution is 100 units/2 ml with a final concentration of 5 units per 0.1 ml.  A sterile 30-gauge, 0.5 inch needle as 0.1 ml (5 units) injection per each site. Injections were divided across 7 specific head/neck muscle areas as specified in the table below. All muscles were injected bilaterally with half the number of injection sites administered to the left, and half to the right side of the head and neck. 45 units wasted.        Head/Neck Area Dose (number of sites)   Frontalis bilaterally 20 units divided in 4 sites    bilaterally 10 units divided in 2 sites   Procerus 5 unit in 1 site   Occipitalis bilaterally 30 units divided in 6 sites   Temporalis bilaterally 40 units divided in 8 sites   Trapezius bilaterally 30 units divided in 6 sites   Cervical Paraspinal bilaterally 20 units divided in 2 sites   Total Dose 155 units divided in 31 sites         The procedure was completed successfully without complication  during and after the injections, and the patient tolerated well throughout the procedure.        The recommended re-treatment schedule should be ~12 weeks from today.

## 2025-06-25 ENCOUNTER — LAB ENCOUNTER (OUTPATIENT)
Dept: LAB | Age: 40
End: 2025-06-25
Attending: NURSE PRACTITIONER
Payer: COMMERCIAL

## 2025-06-25 DIAGNOSIS — R73.03 PREDIABETES: ICD-10-CM

## 2025-06-25 DIAGNOSIS — E78.5 DYSLIPIDEMIA: ICD-10-CM

## 2025-06-25 DIAGNOSIS — Z51.81 ENCOUNTER FOR THERAPEUTIC DRUG MONITORING: ICD-10-CM

## 2025-06-25 DIAGNOSIS — E66.813 CLASS 3 SEVERE OBESITY WITH SERIOUS COMORBIDITY AND BODY MASS INDEX (BMI) OF 40.0 TO 44.9 IN ADULT, UNSPECIFIED OBESITY TYPE: ICD-10-CM

## 2025-06-25 LAB
CHOLEST SERPL-MCNC: 163 MG/DL (ref ?–200)
FASTING PATIENT LIPID ANSWER: YES
HDLC SERPL-MCNC: 45 MG/DL (ref 40–59)
LDLC SERPL CALC-MCNC: 90 MG/DL (ref ?–100)
NONHDLC SERPL-MCNC: 118 MG/DL (ref ?–130)
TRIGL SERPL-MCNC: 164 MG/DL (ref 30–149)
VLDLC SERPL CALC-MCNC: 27 MG/DL (ref 0–30)

## 2025-06-25 PROCEDURE — 80061 LIPID PANEL: CPT | Performed by: NURSE PRACTITIONER

## 2025-06-30 ENCOUNTER — HOSPITAL ENCOUNTER (OUTPATIENT)
Dept: MAMMOGRAPHY | Age: 40
Discharge: HOME | End: 2025-06-30
Attending: INTERNAL MEDICINE
Payer: COMMERCIAL

## 2025-06-30 DIAGNOSIS — Z12.31 ENCOUNTER FOR SCREENING MAMMOGRAM FOR MALIGNANT NEOPLASM OF BREAST: ICD-10-CM

## 2025-06-30 PROCEDURE — 77067 SCR MAMMO BI INCL CAD: CPT | Performed by: INTERNAL MEDICINE

## 2025-06-30 PROCEDURE — 77063 BREAST TOMOSYNTHESIS BI: CPT | Performed by: INTERNAL MEDICINE

## 2025-07-02 NOTE — PROGRESS NOTES
Patient needs additional views of her right breast. It seems that order has already been placed. Can she be notified of this and to schedule follow up mammogram at her convenience. Thanks!

## 2025-07-18 RX ORDER — CYCLOBENZAPRINE HCL 10 MG
10 TABLET ORAL NIGHTLY PRN
Qty: 30 TABLET | Refills: 0 | Status: SHIPPED | OUTPATIENT
Start: 2025-07-18

## 2025-07-18 NOTE — TELEPHONE ENCOUNTER
The patient is requesting a refill on: Cyclobenzaprine HCl 10 MG Oral Tablet     [place] Cyclobenzap[place]     Dispensed Written Strength Quantity Refills Days Supply Provider Pharmacy   CYCLOBENZAPRINE HCL 06/19/2025 06/19/2025 10 mg 30  30 MUQTADAMALIK GAVIRIA MD [facility]    CYCLOBENZAPRINE HCL 05/19/2025 05/19/2025 10 mg 30  30 MUQTADAMALIK GAVIRIA MD [facility]    CYCLOBENZAPRINE HCL 04/18/2025 04/18/2025 10 mg 30  30 MUQTADARMALIK MD [facility]    CYCLOBENZAPRINE HCL 03/14/2025 12/24/2024 10 mg 30  30 MUQTADARMALIK MD [facility]    CYCLOBENZAPRINE HCL 02/10/2025 12/24/2024 10 mg 30  30 MUQTADARMALIK MD [facility]      Last OV: 5/27/25 (botox)  Next OV:   Future Appointments   Date Time Provider Department Center   10/28/2025  9:20 AM Mel Go APRN [facility] [address]   1/21/2026 10:00 AM Mel Go APRN [facility] [address]

## 2025-08-21 ENCOUNTER — PATIENT MESSAGE (OUTPATIENT)
Dept: INTERNAL MEDICINE CLINIC | Facility: CLINIC | Age: 40
End: 2025-08-21

## 2025-08-21 DIAGNOSIS — E66.813 CLASS 3 SEVERE OBESITY WITH SERIOUS COMORBIDITY AND BODY MASS INDEX (BMI) OF 40.0 TO 44.9 IN ADULT, UNSPECIFIED OBESITY TYPE: ICD-10-CM

## 2025-08-21 DIAGNOSIS — Z51.81 ENCOUNTER FOR THERAPEUTIC DRUG MONITORING: ICD-10-CM

## 2025-08-21 RX ORDER — NALTREXONE HYDROCHLORIDE 50 MG/1
TABLET, FILM COATED ORAL
Qty: 15 TABLET | Refills: 0 | OUTPATIENT
Start: 2025-08-21

## 2025-08-23 RX ORDER — NALTREXONE HYDROCHLORIDE 50 MG/1
50 TABLET, FILM COATED ORAL DAILY
Qty: 30 TABLET | Refills: 2 | Status: SHIPPED | OUTPATIENT
Start: 2025-08-23

## 2025-08-26 RX ORDER — CYCLOBENZAPRINE HCL 10 MG
10 TABLET ORAL NIGHTLY PRN
Qty: 30 TABLET | Refills: 0 | Status: SHIPPED | OUTPATIENT
Start: 2025-08-26

## (undated) NOTE — LETTER
AUTHORIZATION FOR SURGICAL OPERATION OR OTHER PROCEDURE    1. I hereby authorize Dr. Adarsh Monroy and the [facility] staff assigned to my case to perform the following operation and/or procedure at the [facility]:    Chronic migraine w/o aura w/o status migrainosus   _______________________________________________________________________________________________    Botox 200 units / Buy&Bill   _______________________________________________________________________________________________    2.  My physician has explained the nature and purpose of the operation or other procedure, possible alternative methods of treatment, the risks involved, and the possibility of complication to me.  I acknowledge that no guarantee has been made as to the result that may be obtained.  3.  I recognize that, during the course of this operation, or other procedure, unforseen conditions may necessitate additional or different procedure than those listed above.  I, therefore, further authorize and request that the above named physician, his/her physician assistants or designees perform such procedures as are, in his/her professional opinion, necessary and desirable.  4.  Any tissue or organs removed in the operation or other procedure may be disposed of by and at the discretion of the [facility] staff and [facility].  5.  I understand that in the event of a medical emergency, I will be transported by local paramedics to [facility] or other hospital emergency department.  6.  I certify that I have read and fully understand the above consent to operation and/or other procedure.    7.  I acknowledge that my physician has explained sedation/analgesia administration to me including the risks and benefits.  I consent to the administration of sedation/analgesia as may be necessary or desirable in the judgement of my physician.        Witness signature:  ___________________________________________________ Date:  ______/______/_____                    Time:  ________ A.M.  P.M.       Patient Name:  Karlie Dash   5/14/1985   [id number]         Patient signature:  ___________________________________________________        Statement of Physician  My signature below affirms that prior to the time of the procedure, I have explained to the patient and/or his/her guardian, the risks and benefits involved in the proposed treatment and any reasonable alternative to the proposed treatment.  I have also explained the risks and benefits involved in the refusal of the proposed treatment and have answered the patient's questions.                        Date:  ______/______/_______  Provider                      Signature:  __________________________________________________________       Time:  ___________ A.M    P.M.

## (undated) NOTE — LETTER
Date: May 23, 2025      Patient Name: Karlie Dash      : 1985        Thank you for choosing [facility] as your health care provider. Your physician has deemed the following medical service(s) necessary. However, your insurance plan may not pay for all of your health care and costs and may deny payment for this service. The fact that your insurance plan does not pay for an item or service does not mean you should not receive it. The purpose of this form is to help you make an informed decision about whether or not you want to receive this service(s) that may not be paid for by your insurance plan.    CPT Code Description     Cost     _________ ______BOTOX 200 UNIT_________  ___4,000____      _________ ______________________________ _____________      _________ ______________________________ _____________      I understand that the above mentioned service(s) or supply may not be covered by my insurance company. I agree to be financially responsible for the cost of this service or supply in the event of my insurance denies payment as a non-covered benefit.     (PLEASE INFORM THE PATIENT TO CONTACT THE OFFICE IF THE INSURANCE CHANGES WITHIN THE YEAR AS HE/SHE WILL BE RESPONSIBLE TO UPDATE THE OFFICE IF INSURANCE CHANGES SO THAT PROCEDURE IS BILLED CORRECTLY) this will be 1   Dx: G43.709   Last Botox done: 25      ______________________________________________________________________  Signature of Patient or Patient's Representative  Relationship  Date        ______________________________________________________________________  Signature of Witness to signing of form   Printed Name

## (undated) NOTE — LETTER
AUTHORIZATION FOR SURGICAL OPERATION OR OTHER PROCEDURE    1. I hereby authorize Dr. Bang Chiang and the [facility] staff assigned to my case to perform the following operation and/or procedure at the [facility]:    _______________________________________________________________________________________________     Botox 200 units    Buy and bill  _______________________________________________________________________________________________    2.  My physician has explained the nature and purpose of the operation or other procedure, possible alternative methods of treatment, the risks involved, and the possibility of complication to me.  I acknowledge that no guarantee has been made as to the result that may be obtained.  3.  I recognize that, during the course of this operation, or other procedure, unforseen conditions may necessitate additional or different procedure than those listed above.  I, therefore, further authorize and request that the above named physician, his/her physician assistants or designees perform such procedures as are, in his/her professional opinion, necessary and desirable.  4.  Any tissue or organs removed in the operation or other procedure may be disposed of by and at the discretion of the [facility] staff and [facility].  5.  I understand that in the event of a medical emergency, I will be transported by local paramedics to [facility] or other hospital emergency department.  6.  I certify that I have read and fully understand the above consent to operation and/or other procedure.    7.  I acknowledge that my physician has explained sedation/analgesia administration to me including the risks and benefits.  I consent to the administration of sedation/analgesia as may be necessary or desirable in the judgement of my physician.    Witness signature: ___________________________________________________ Date:  ______/______/_____                    Time:   ________ A.MGuerda  P.MGuerda     Karlie Venegaso  [id number]  5/14/1985         Patient signature:  ___________________________________________________               Statement of Physician  My signature below affirms that prior to the time of the procedure, I have explained to the patient and/or his/her guardian, the risks and benefits involved in the proposed treatment and any reasonable alternative to the proposed treatment.  I have also explained the risks and benefits involved in the refusal of the proposed treatment and have answered the patient's questions.                        Date:  ______/______/_______  Provider                      Signature:  __________________________________________________________       Time:  ___________ A.M    P.M.

## (undated) NOTE — LETTER
Date: 2024      Patient Name: Karlie Dash      : 1985        Thank you for choosing [facility] as your health care provider. Your physician has deemed the following medical service(s) necessary. However, your insurance plan may not pay for all of your health care and costs and may deny payment for this service. The fact that your insurance plan does not pay for an item or service does not mean you should not receive it. The purpose of this form is to help you make an informed decision about whether or not you want to receive this service(s) that may not be paid for by your insurance plan.    CPT Code Description     Cost     _________  Botox 200 units     $4000    _________ ______________________________ _____________      _________ ______________________________ _____________      I understand that the above mentioned service(s) or supply may not be covered by my insurance company. I agree to be financially responsible for the cost of this service or supply in the event of my insurance denies payment as a non-covered benefit.        ______________________________________________________________________  Signature of Patient or Patient's Representative  Relationship  Date    ______________________________________________________________________  Signature of Witness to signing of form   Printed Name

## (undated) NOTE — LETTER
AUTHORIZATION FOR SURGICAL OPERATION OR OTHER PROCEDURE    1. I hereby authorize Dr. Bang Chiang and the [facility] staff assigned to my case to perform the following operation and/or procedure at the [facility]:    _______________________________________________________________________________________________    BILATERAL OCCIPITAL NERVE BLOCK  _______________________________________________________________________________________________    2.  My physician has explained the nature and purpose of the operation or other procedure, possible alternative methods of treatment, the risks involved, and the possibility of complication to me.  I acknowledge that no guarantee has been made as to the result that may be obtained.  3.  I recognize that, during the course of this operation, or other procedure, unforseen conditions may necessitate additional or different procedure than those listed above.  I, therefore, further authorize and request that the above named physician, his/her physician assistants or designees perform such procedures as are, in his/her professional opinion, necessary and desirable.  4.  Any tissue or organs removed in the operation or other procedure may be disposed of by and at the discretion of the [facility] staff and [facility].  5.  I understand that in the event of a medical emergency, I will be transported by local paramedics to [facility] or other hospital emergency department.  6.  I certify that I have read and fully understand the above consent to operation and/or other procedure.    7.  I acknowledge that my physician has explained sedation/analgesia administration to me including the risks and benefits.  I consent to the administration of sedation/analgesia as may be necessary or desirable in the judgement of my physician.    Witness signature: ___________________________________________________ Date:  ______/______/_____                    Time:   ________ A.M.  P.M.       Patient Name:  Karlie Dash  5/14/1985  [id number]      Patient signature:  ___________________________________________________               Statement of Physician  My signature below affirms that prior to the time of the procedure, I have explained to the patient and/or his/her guardian, the risks and benefits involved in the proposed treatment and any reasonable alternative to the proposed treatment.  I have also explained the risks and benefits involved in the refusal of the proposed treatment and have answered the patient's questions.                        Date:  ______/______/_______  Provider                      Signature:  __________________________________________________________       Time:  ___________ A.M    P.M.

## (undated) NOTE — LETTER
AUTHORIZATION FOR SURGICAL OPERATION OR OTHER PROCEDURE    1. I hereby authorize Dr. Bang Chiang and the [facility] staff assigned to my case to perform the following operation and/or procedure at the [facility]:    _______________________________________________________________________________________________    Botox 200 Units  _______________________________________________________________________________________________    2.  My physician has explained the nature and purpose of the operation or other procedure, possible alternative methods of treatment, the risks involved, and the possibility of complication to me.  I acknowledge that no guarantee has been made as to the result that may be obtained.  3.  I recognize that, during the course of this operation, or other procedure, unforseen conditions may necessitate additional or different procedure than those listed above.  I, therefore, further authorize and request that the above named physician, his/her physician assistants or designees perform such procedures as are, in his/her professional opinion, necessary and desirable.  4.  Any tissue or organs removed in the operation or other procedure may be disposed of by and at the discretion of the [facility] staff and [facility].  5.  I understand that in the event of a medical emergency, I will be transported by local paramedics to [facility] or other hospital emergency department.  6.  I certify that I have read and fully understand the above consent to operation and/or other procedure.    7.  I acknowledge that my physician has explained sedation/analgesia administration to me including the risks and benefits.  I consent to the administration of sedation/analgesia as may be necessary or desirable in the judgement of my physician.    Witness signature: ___________________________________________________ Date:  ______/______/_____                    Time:  ________ A.M.   JOSE ELIAS Dash  [id number]  5/14/1985         Patient signature:  ___________________________________________________      Statement of Physician  My signature below affirms that prior to the time of the procedure, I have explained to the patient and/or his/her guardian, the risks and benefits involved in the proposed treatment and any reasonable alternative to the proposed treatment.  I have also explained the risks and benefits involved in the refusal of the proposed treatment and have answered the patient's questions.                        Date:  ______/______/_______  Provider                      Signature:  __________________________________________________________       Time:  ___________ KHUSHI MOCTEZUMA

## (undated) NOTE — LETTER
Date: April 15, 2025      Patient Name: Karlie Dash      : 1985        Thank you for choosing [facility] as your health care provider. Your physician has deemed the following medical service(s) necessary. However, your insurance plan may not pay for all of your health care and costs and may deny payment for this service. The fact that your insurance plan does not pay for an item or service does not mean you should not receive it. The purpose of this form is to help you make an informed decision about whether or not you want to receive this service(s) that may not be paid for by your insurance plan.    CPT Code Description     Cost     _________ ______________________________  _____________      BILATERAL OCCIPITAL NERVE BLOCK $250  _________ ______________________________ _____________      _________ ______________________________ _____________      I understand that the above mentioned service(s) or supply may not be covered by my insurance company. I agree to be financially responsible for the cost of this service or supply in the event of my insurance denies payment as a non-covered benefit.        ______________________________________________________________________  Signature of Patient or Patient's Representative  Relationship  Date    ______________________________________________________________________  Signature of Witness to signing of form   Printed Name

## (undated) NOTE — MR AVS SNAPSHOT
[address]  [address] [address] [phone]               Thank you for choosing us for your health care visit with KORY Wang. We are glad to serve you and happy to provide you with this summary of your visit. provider before using these medicines. Do not give aspirin to anyone under 25years of age who has a fever. It may cause severe illness or death.   Follow-up care  Follow up with your healthcare provider, or as advised, in 2 weeks if all symptoms have not g You can access your MyChart to more actively manage your health care and view more details from this visit by going to [email or web address].   If you've recently had a stay at the Hospital you can access your discharge instructions in [address] by jaci You don’t need to join a gym. Home exercises work great.  Put more priority on exercise in your life                    Visit [facility] online at  [email or web address]

## (undated) NOTE — Clinical Note
Thank you for referring Karlie to the [facility]. Consult was completed today via video.  I have ordered labs, referred for a nutrition consultation with our dietician.  I counseled on the importance of lifestyle intervention in adjunct with medication and started Zepbound for treatment with follow-up advised in about 3 months.